# Patient Record
Sex: FEMALE | Race: WHITE | Employment: OTHER
[De-identification: names, ages, dates, MRNs, and addresses within clinical notes are randomized per-mention and may not be internally consistent; named-entity substitution may affect disease eponyms.]

---

## 2017-01-04 ENCOUNTER — OFFICE VISIT (OUTPATIENT)
Dept: ONCOLOGY | Facility: CLINIC | Age: 82
End: 2017-01-04

## 2017-01-04 VITALS
WEIGHT: 179.4 LBS | TEMPERATURE: 97.9 F | DIASTOLIC BLOOD PRESSURE: 77 MMHG | HEIGHT: 62 IN | HEART RATE: 79 BPM | RESPIRATION RATE: 16 BRPM | SYSTOLIC BLOOD PRESSURE: 154 MMHG | BODY MASS INDEX: 33.01 KG/M2

## 2017-01-04 DIAGNOSIS — C50.411 MALIGNANT NEOPLASM OF UPPER-OUTER QUADRANT OF RIGHT FEMALE BREAST (HCC): Primary | ICD-10-CM

## 2017-01-04 DIAGNOSIS — N63.0 BREAST LUMP: ICD-10-CM

## 2017-01-04 PROCEDURE — 1123F ACP DISCUSS/DSCN MKR DOCD: CPT | Performed by: INTERNAL MEDICINE

## 2017-01-04 PROCEDURE — 4040F PNEUMOC VAC/ADMIN/RCVD: CPT | Performed by: INTERNAL MEDICINE

## 2017-01-04 PROCEDURE — 1036F TOBACCO NON-USER: CPT | Performed by: INTERNAL MEDICINE

## 2017-01-04 PROCEDURE — G8427 DOCREV CUR MEDS BY ELIG CLIN: HCPCS | Performed by: INTERNAL MEDICINE

## 2017-01-04 PROCEDURE — G8419 CALC BMI OUT NRM PARAM NOF/U: HCPCS | Performed by: INTERNAL MEDICINE

## 2017-01-04 PROCEDURE — 1090F PRES/ABSN URINE INCON ASSESS: CPT | Performed by: INTERNAL MEDICINE

## 2017-01-04 PROCEDURE — G8484 FLU IMMUNIZE NO ADMIN: HCPCS | Performed by: INTERNAL MEDICINE

## 2017-01-04 PROCEDURE — G8400 PT W/DXA NO RESULTS DOC: HCPCS | Performed by: INTERNAL MEDICINE

## 2017-01-04 PROCEDURE — 99214 OFFICE O/P EST MOD 30 MIN: CPT | Performed by: INTERNAL MEDICINE

## 2017-02-09 RX ORDER — SITAGLIPTIN 100 MG/1
TABLET, FILM COATED ORAL
Qty: 90 TABLET | Refills: 1 | Status: SHIPPED | OUTPATIENT
Start: 2017-02-09 | End: 2017-04-03 | Stop reason: SDUPTHER

## 2017-03-13 ENCOUNTER — OFFICE VISIT (OUTPATIENT)
Dept: CARDIOLOGY | Age: 82
End: 2017-03-13
Payer: MEDICARE

## 2017-03-13 VITALS
OXYGEN SATURATION: 98 % | HEIGHT: 62 IN | DIASTOLIC BLOOD PRESSURE: 90 MMHG | HEART RATE: 82 BPM | WEIGHT: 180.4 LBS | BODY MASS INDEX: 33.2 KG/M2 | SYSTOLIC BLOOD PRESSURE: 123 MMHG

## 2017-03-13 DIAGNOSIS — I48.0 PAF (PAROXYSMAL ATRIAL FIBRILLATION) (HCC): Primary | ICD-10-CM

## 2017-03-13 DIAGNOSIS — E78.5 DYSLIPIDEMIA: ICD-10-CM

## 2017-03-13 DIAGNOSIS — Z79.01 CHRONIC ANTICOAGULATION: ICD-10-CM

## 2017-03-13 DIAGNOSIS — I05.9 MITRAL VALVE DISEASE: ICD-10-CM

## 2017-03-13 DIAGNOSIS — I10 ESSENTIAL HYPERTENSION: ICD-10-CM

## 2017-03-13 DIAGNOSIS — R94.31 ABNORMAL HOLTER EXAM: ICD-10-CM

## 2017-03-13 PROCEDURE — 1090F PRES/ABSN URINE INCON ASSESS: CPT | Performed by: INTERNAL MEDICINE

## 2017-03-13 PROCEDURE — G8417 CALC BMI ABV UP PARAM F/U: HCPCS | Performed by: INTERNAL MEDICINE

## 2017-03-13 PROCEDURE — G8400 PT W/DXA NO RESULTS DOC: HCPCS | Performed by: INTERNAL MEDICINE

## 2017-03-13 PROCEDURE — 99214 OFFICE O/P EST MOD 30 MIN: CPT | Performed by: INTERNAL MEDICINE

## 2017-03-13 PROCEDURE — 4040F PNEUMOC VAC/ADMIN/RCVD: CPT | Performed by: INTERNAL MEDICINE

## 2017-03-13 PROCEDURE — 1036F TOBACCO NON-USER: CPT | Performed by: INTERNAL MEDICINE

## 2017-03-13 PROCEDURE — 93000 ELECTROCARDIOGRAM COMPLETE: CPT | Performed by: INTERNAL MEDICINE

## 2017-03-13 PROCEDURE — 1123F ACP DISCUSS/DSCN MKR DOCD: CPT | Performed by: INTERNAL MEDICINE

## 2017-03-13 PROCEDURE — G8484 FLU IMMUNIZE NO ADMIN: HCPCS | Performed by: INTERNAL MEDICINE

## 2017-03-13 PROCEDURE — G8427 DOCREV CUR MEDS BY ELIG CLIN: HCPCS | Performed by: INTERNAL MEDICINE

## 2017-04-03 ENCOUNTER — OFFICE VISIT (OUTPATIENT)
Dept: PRIMARY CARE CLINIC | Age: 82
End: 2017-04-03
Payer: MEDICARE

## 2017-04-03 VITALS
RESPIRATION RATE: 16 BRPM | HEIGHT: 62 IN | BODY MASS INDEX: 33.57 KG/M2 | HEART RATE: 72 BPM | DIASTOLIC BLOOD PRESSURE: 85 MMHG | WEIGHT: 182.4 LBS | SYSTOLIC BLOOD PRESSURE: 130 MMHG

## 2017-04-03 DIAGNOSIS — I10 ESSENTIAL HYPERTENSION: ICD-10-CM

## 2017-04-03 DIAGNOSIS — Z23 NEED FOR VACCINATION WITH 13-POLYVALENT PNEUMOCOCCAL CONJUGATE VACCINE: ICD-10-CM

## 2017-04-03 DIAGNOSIS — E11.9 TYPE 2 DIABETES MELLITUS WITHOUT COMPLICATION, WITHOUT LONG-TERM CURRENT USE OF INSULIN (HCC): Primary | ICD-10-CM

## 2017-04-03 DIAGNOSIS — N28.9 RENAL INSUFFICIENCY: ICD-10-CM

## 2017-04-03 PROCEDURE — 4040F PNEUMOC VAC/ADMIN/RCVD: CPT | Performed by: FAMILY MEDICINE

## 2017-04-03 PROCEDURE — 99214 OFFICE O/P EST MOD 30 MIN: CPT | Performed by: FAMILY MEDICINE

## 2017-04-03 PROCEDURE — G8427 DOCREV CUR MEDS BY ELIG CLIN: HCPCS | Performed by: FAMILY MEDICINE

## 2017-04-03 PROCEDURE — G8417 CALC BMI ABV UP PARAM F/U: HCPCS | Performed by: FAMILY MEDICINE

## 2017-04-03 PROCEDURE — 1090F PRES/ABSN URINE INCON ASSESS: CPT | Performed by: FAMILY MEDICINE

## 2017-04-03 PROCEDURE — G8400 PT W/DXA NO RESULTS DOC: HCPCS | Performed by: FAMILY MEDICINE

## 2017-04-03 PROCEDURE — 1036F TOBACCO NON-USER: CPT | Performed by: FAMILY MEDICINE

## 2017-04-03 PROCEDURE — 90670 PCV13 VACCINE IM: CPT | Performed by: FAMILY MEDICINE

## 2017-04-03 PROCEDURE — G0009 ADMIN PNEUMOCOCCAL VACCINE: HCPCS | Performed by: FAMILY MEDICINE

## 2017-04-03 PROCEDURE — 1123F ACP DISCUSS/DSCN MKR DOCD: CPT | Performed by: FAMILY MEDICINE

## 2017-04-16 ASSESSMENT — ENCOUNTER SYMPTOMS
DIARRHEA: 0
CONSTIPATION: 0
ORTHOPNEA: 0
WHEEZING: 0
NAUSEA: 0
ABDOMINAL PAIN: 0
PHOTOPHOBIA: 0
COLOR CHANGE: 0
VOMITING: 0
BACK PAIN: 0
SHORTNESS OF BREATH: 0
TROUBLE SWALLOWING: 0
ABDOMINAL DISTENTION: 0
BLURRED VISION: 0
COUGH: 0

## 2017-04-17 ENCOUNTER — TELEPHONE (OUTPATIENT)
Dept: PRIMARY CARE CLINIC | Age: 82
End: 2017-04-17

## 2017-05-11 ENCOUNTER — HOSPITAL ENCOUNTER (OUTPATIENT)
Age: 82
Discharge: HOME | End: 2017-05-11
Payer: MEDICARE

## 2017-05-11 DIAGNOSIS — E11.9 TYPE 2 DIABETES MELLITUS WITHOUT COMPLICATION, WITHOUT LONG-TERM CURRENT USE OF INSULIN (HCC): ICD-10-CM

## 2017-05-11 DIAGNOSIS — N28.9 RENAL INSUFFICIENCY: ICD-10-CM

## 2017-05-11 LAB
ANION GAP SERPL CALCULATED.3IONS-SCNC: 15 MMOL/L (ref 9–17)
BUN BLDV-MCNC: 20 MG/DL (ref 8–23)
BUN/CREAT BLD: 18 (ref 9–20)
CALCIUM SERPL-MCNC: 9.4 MG/DL (ref 8.6–10.4)
CHLORIDE BLD-SCNC: 104 MMOL/L (ref 98–107)
CO2: 24 MMOL/L (ref 20–31)
CREAT SERPL-MCNC: 1.13 MG/DL (ref 0.5–0.9)
ESTIMATED AVERAGE GLUCOSE: 154 MG/DL
GFR AFRICAN AMERICAN: 56 ML/MIN
GFR NON-AFRICAN AMERICAN: 46 ML/MIN
GFR SERPL CREATININE-BSD FRML MDRD: ABNORMAL ML/MIN/{1.73_M2}
GFR SERPL CREATININE-BSD FRML MDRD: ABNORMAL ML/MIN/{1.73_M2}
GLUCOSE BLD-MCNC: 240 MG/DL (ref 70–99)
HBA1C MFR BLD: 7 % (ref 4.8–5.9)
POTASSIUM SERPL-SCNC: 4.1 MMOL/L (ref 3.7–5.3)
SODIUM BLD-SCNC: 143 MMOL/L (ref 135–144)

## 2017-05-11 PROCEDURE — 36415 COLL VENOUS BLD VENIPUNCTURE: CPT

## 2017-05-11 PROCEDURE — 83036 HEMOGLOBIN GLYCOSYLATED A1C: CPT

## 2017-05-11 PROCEDURE — 80048 BASIC METABOLIC PNL TOTAL CA: CPT

## 2017-05-12 ENCOUNTER — TELEPHONE (OUTPATIENT)
Dept: PRIMARY CARE CLINIC | Age: 82
End: 2017-05-12

## 2017-05-12 RX ORDER — LOSARTAN POTASSIUM 100 MG/1
TABLET ORAL
Qty: 90 TABLET | Refills: 1 | Status: SHIPPED | OUTPATIENT
Start: 2017-05-12 | End: 2017-10-25 | Stop reason: SDUPTHER

## 2017-05-17 ENCOUNTER — OFFICE VISIT (OUTPATIENT)
Dept: ONCOLOGY | Age: 82
End: 2017-05-17
Payer: MEDICARE

## 2017-05-17 VITALS
BODY MASS INDEX: 33.31 KG/M2 | HEART RATE: 85 BPM | RESPIRATION RATE: 20 BRPM | SYSTOLIC BLOOD PRESSURE: 141 MMHG | WEIGHT: 181 LBS | TEMPERATURE: 97.9 F | DIASTOLIC BLOOD PRESSURE: 73 MMHG | HEIGHT: 62 IN

## 2017-05-17 DIAGNOSIS — E11.9 TYPE 2 DIABETES MELLITUS WITHOUT COMPLICATION, WITHOUT LONG-TERM CURRENT USE OF INSULIN (HCC): ICD-10-CM

## 2017-05-17 DIAGNOSIS — C50.411 MALIGNANT NEOPLASM OF UPPER-OUTER QUADRANT OF RIGHT FEMALE BREAST (HCC): Primary | ICD-10-CM

## 2017-05-17 PROCEDURE — 99214 OFFICE O/P EST MOD 30 MIN: CPT | Performed by: INTERNAL MEDICINE

## 2017-05-17 PROCEDURE — 4040F PNEUMOC VAC/ADMIN/RCVD: CPT | Performed by: INTERNAL MEDICINE

## 2017-05-17 PROCEDURE — 1123F ACP DISCUSS/DSCN MKR DOCD: CPT | Performed by: INTERNAL MEDICINE

## 2017-05-17 PROCEDURE — G8400 PT W/DXA NO RESULTS DOC: HCPCS | Performed by: INTERNAL MEDICINE

## 2017-05-17 PROCEDURE — G8417 CALC BMI ABV UP PARAM F/U: HCPCS | Performed by: INTERNAL MEDICINE

## 2017-05-17 PROCEDURE — 1036F TOBACCO NON-USER: CPT | Performed by: INTERNAL MEDICINE

## 2017-05-17 PROCEDURE — 1090F PRES/ABSN URINE INCON ASSESS: CPT | Performed by: INTERNAL MEDICINE

## 2017-05-17 PROCEDURE — G8427 DOCREV CUR MEDS BY ELIG CLIN: HCPCS | Performed by: INTERNAL MEDICINE

## 2017-05-17 RX ORDER — NYSTATIN 100000 [USP'U]/G
POWDER TOPICAL
Qty: 1 BOTTLE | Refills: 2 | Status: SHIPPED | OUTPATIENT
Start: 2017-05-17 | End: 2017-12-06 | Stop reason: ALTCHOICE

## 2017-06-08 RX ORDER — ATORVASTATIN CALCIUM 40 MG/1
TABLET, FILM COATED ORAL
Qty: 90 TABLET | Refills: 1 | Status: SHIPPED | OUTPATIENT
Start: 2017-06-08 | End: 2017-10-25 | Stop reason: SDUPTHER

## 2017-06-13 RX ORDER — DILTIAZEM HYDROCHLORIDE 90 MG/1
CAPSULE, EXTENDED RELEASE ORAL
Qty: 180 CAPSULE | Refills: 1 | Status: SHIPPED | OUTPATIENT
Start: 2017-06-13 | End: 2017-08-29 | Stop reason: SDUPTHER

## 2017-08-17 PROBLEM — I48.0 PAROXYSMAL ATRIAL FIBRILLATION (HCC): Status: ACTIVE | Noted: 2017-08-17

## 2017-08-29 RX ORDER — DILTIAZEM HYDROCHLORIDE 90 MG/1
CAPSULE, EXTENDED RELEASE ORAL
Qty: 180 CAPSULE | Refills: 1 | Status: SHIPPED | OUTPATIENT
Start: 2017-08-29 | End: 2017-09-13

## 2017-09-13 ENCOUNTER — TELEPHONE (OUTPATIENT)
Dept: FAMILY MEDICINE CLINIC | Age: 82
End: 2017-09-13

## 2017-09-13 RX ORDER — DILTIAZEM HYDROCHLORIDE 90 MG/1
CAPSULE, EXTENDED RELEASE ORAL
Qty: 180 CAPSULE | Refills: 1 | Status: CANCELLED | OUTPATIENT
Start: 2017-09-13

## 2017-09-14 ENCOUNTER — OFFICE VISIT (OUTPATIENT)
Dept: CARDIOLOGY | Age: 82
End: 2017-09-14
Payer: MEDICARE

## 2017-09-14 VITALS
HEART RATE: 92 BPM | WEIGHT: 176 LBS | SYSTOLIC BLOOD PRESSURE: 137 MMHG | DIASTOLIC BLOOD PRESSURE: 61 MMHG | BODY MASS INDEX: 32.39 KG/M2 | HEIGHT: 62 IN | OXYGEN SATURATION: 94 %

## 2017-09-14 DIAGNOSIS — I48.0 PAROXYSMAL ATRIAL FIBRILLATION (HCC): Primary | ICD-10-CM

## 2017-09-14 DIAGNOSIS — I10 ESSENTIAL HYPERTENSION: ICD-10-CM

## 2017-09-14 DIAGNOSIS — E78.5 DYSLIPIDEMIA: ICD-10-CM

## 2017-09-14 DIAGNOSIS — R94.31 ABNORMAL HOLTER EXAM: ICD-10-CM

## 2017-09-14 PROCEDURE — G8417 CALC BMI ABV UP PARAM F/U: HCPCS | Performed by: INTERNAL MEDICINE

## 2017-09-14 PROCEDURE — 99214 OFFICE O/P EST MOD 30 MIN: CPT | Performed by: INTERNAL MEDICINE

## 2017-09-14 PROCEDURE — 93000 ELECTROCARDIOGRAM COMPLETE: CPT | Performed by: INTERNAL MEDICINE

## 2017-09-14 PROCEDURE — 4040F PNEUMOC VAC/ADMIN/RCVD: CPT | Performed by: INTERNAL MEDICINE

## 2017-09-14 PROCEDURE — G8427 DOCREV CUR MEDS BY ELIG CLIN: HCPCS | Performed by: INTERNAL MEDICINE

## 2017-09-14 PROCEDURE — 1090F PRES/ABSN URINE INCON ASSESS: CPT | Performed by: INTERNAL MEDICINE

## 2017-09-14 PROCEDURE — G8400 PT W/DXA NO RESULTS DOC: HCPCS | Performed by: INTERNAL MEDICINE

## 2017-09-14 PROCEDURE — 1123F ACP DISCUSS/DSCN MKR DOCD: CPT | Performed by: INTERNAL MEDICINE

## 2017-09-14 PROCEDURE — 1036F TOBACCO NON-USER: CPT | Performed by: INTERNAL MEDICINE

## 2017-10-19 RX ORDER — DILTIAZEM HYDROCHLORIDE 90 MG/1
CAPSULE, EXTENDED RELEASE ORAL
Qty: 180 CAPSULE | Refills: 1 | OUTPATIENT
Start: 2017-10-19

## 2017-10-25 ENCOUNTER — OFFICE VISIT (OUTPATIENT)
Dept: FAMILY MEDICINE CLINIC | Age: 82
End: 2017-10-25
Payer: MEDICARE

## 2017-10-25 VITALS
WEIGHT: 174 LBS | HEART RATE: 74 BPM | DIASTOLIC BLOOD PRESSURE: 68 MMHG | OXYGEN SATURATION: 97 % | SYSTOLIC BLOOD PRESSURE: 115 MMHG | BODY MASS INDEX: 31.83 KG/M2

## 2017-10-25 DIAGNOSIS — E11.9 TYPE 2 DIABETES MELLITUS WITHOUT COMPLICATION, WITHOUT LONG-TERM CURRENT USE OF INSULIN (HCC): Primary | ICD-10-CM

## 2017-10-25 DIAGNOSIS — K21.9 GASTROESOPHAGEAL REFLUX DISEASE WITHOUT ESOPHAGITIS: ICD-10-CM

## 2017-10-25 DIAGNOSIS — I10 ESSENTIAL HYPERTENSION: ICD-10-CM

## 2017-10-25 LAB — HBA1C MFR BLD: 6.2 %

## 2017-10-25 PROCEDURE — 83036 HEMOGLOBIN GLYCOSYLATED A1C: CPT | Performed by: FAMILY MEDICINE

## 2017-10-25 PROCEDURE — 1123F ACP DISCUSS/DSCN MKR DOCD: CPT | Performed by: FAMILY MEDICINE

## 2017-10-25 PROCEDURE — G8400 PT W/DXA NO RESULTS DOC: HCPCS | Performed by: FAMILY MEDICINE

## 2017-10-25 PROCEDURE — 4040F PNEUMOC VAC/ADMIN/RCVD: CPT | Performed by: FAMILY MEDICINE

## 2017-10-25 PROCEDURE — G8484 FLU IMMUNIZE NO ADMIN: HCPCS | Performed by: FAMILY MEDICINE

## 2017-10-25 PROCEDURE — 1090F PRES/ABSN URINE INCON ASSESS: CPT | Performed by: FAMILY MEDICINE

## 2017-10-25 PROCEDURE — 1036F TOBACCO NON-USER: CPT | Performed by: FAMILY MEDICINE

## 2017-10-25 PROCEDURE — G8417 CALC BMI ABV UP PARAM F/U: HCPCS | Performed by: FAMILY MEDICINE

## 2017-10-25 PROCEDURE — G8427 DOCREV CUR MEDS BY ELIG CLIN: HCPCS | Performed by: FAMILY MEDICINE

## 2017-10-25 PROCEDURE — 99214 OFFICE O/P EST MOD 30 MIN: CPT | Performed by: FAMILY MEDICINE

## 2017-10-25 RX ORDER — ATORVASTATIN CALCIUM 40 MG/1
TABLET, FILM COATED ORAL
Qty: 90 TABLET | Refills: 1 | Status: SHIPPED | OUTPATIENT
Start: 2017-10-25 | End: 2018-06-29 | Stop reason: SDUPTHER

## 2017-10-25 RX ORDER — FAMOTIDINE 20 MG/1
20 TABLET, FILM COATED ORAL
Qty: 45 TABLET | Refills: 1 | Status: SHIPPED | OUTPATIENT
Start: 2017-10-25 | End: 2018-03-15 | Stop reason: ALTCHOICE

## 2017-10-25 RX ORDER — LOSARTAN POTASSIUM 100 MG/1
TABLET ORAL
Qty: 90 TABLET | Refills: 1 | Status: SHIPPED | OUTPATIENT
Start: 2017-10-25 | End: 2018-04-24 | Stop reason: SDUPTHER

## 2017-10-25 RX ORDER — DILTIAZEM HYDROCHLORIDE 90 MG/1
CAPSULE, EXTENDED RELEASE ORAL
Refills: 1 | COMMUNITY
Start: 2017-10-18 | End: 2018-03-15 | Stop reason: SDUPTHER

## 2017-10-25 NOTE — PROGRESS NOTES
HPI Notes    Name: Vijay Nelson  : 1934         Chief Complaint:     Chief Complaint   Patient presents with    Diabetes       History of Present Illness:      Vijay Nelson is a 80 y.o.  female who presents with Diabetes      Diabetes   She presents for her follow-up diabetic visit. She has type 2 diabetes mellitus. Her disease course has been stable. There are no hypoglycemic associated symptoms. Pertinent negatives for hypoglycemia include no dizziness, headaches, nervousness/anxiousness or sweats. There are no diabetic associated symptoms. Pertinent negatives for diabetes include no chest pain, no fatigue, no polydipsia, no polyphagia, no polyuria, no weakness and no weight loss. There are no hypoglycemic complications. Symptoms are stable. There are no diabetic complications. Risk factors for coronary artery disease include diabetes mellitus, hypertension, obesity and post-menopausal. Current diabetic treatment includes oral agent (monotherapy). She is compliant with treatment all of the time. Her weight is stable. She is following a generally healthy diet. Meal planning includes avoidance of concentrated sweets. She participates in exercise intermittently. There is no change in her home blood glucose trend. An ACE inhibitor/angiotensin II receptor blocker is being taken. Hypertension   This is a chronic problem. The current episode started more than 1 year ago. The problem is unchanged. The problem is controlled. Pertinent negatives include no anxiety, chest pain, headaches, malaise/fatigue, neck pain, orthopnea, palpitations, peripheral edema, shortness of breath or sweats. Past treatments include calcium channel blockers and angiotensin blockers. The current treatment provides significant improvement. There are no compliance problems. Gastroesophageal Reflux   She complains of heartburn. She reports no abdominal pain, no chest pain, no coughing, no nausea or no wheezing.  This is a  EYE SURGERY      OTHER SURGICAL HISTORY Right 10/21/15    I&D Rt. breast abcess    TUNNELED VENOUS PORT PLACEMENT Left 06/17/2015        Medications:       Prior to Admission medications    Medication Sig Start Date End Date Taking? Authorizing Provider   SITagliptin (JANUVIA) 100 MG tablet TAKE 1 TABLET EVERY DAY 10/25/17  Yes Della Grewal DO   atorvastatin (LIPITOR) 40 MG tablet TAKE 1 TABLET EVERY DAY 10/25/17  Yes Della Grewal DO   losartan (COZAAR) 100 MG tablet TAKE 1 TABLET EVERY DAY 10/25/17  Yes Della Grewal DO   famotidine (PEPCID) 20 MG tablet Take 1 tablet by mouth every 48 hours 10/25/17  Yes Della Grewal DO   diltiazem (CARDIZEM) 30 MG tablet Take 1.5 tablets by mouth 4 times daily  Patient taking differently: Take 45 mg by mouth 2 times daily  9/13/17  Yes Della Grewal DO   apixaban (ELIQUIS) 2.5 MG TABS tablet Take 1 tablet by mouth 2 times daily 8/31/17  Yes Kalyani Cho MD   nystatin (MYCOSTATIN) 022594 UNIT/GM powder Apply 3 times daily. 5/17/17  Yes Hermilo Saavedra MD   fluticasone (FLONASE) 50 MCG/ACT nasal spray 1 spray by Nasal route daily  Patient taking differently: 1 spray by Nasal route daily as needed  8/10/15  Yes Geoffrey Martel MD   diphenhydrAMINE (BENADRYL) 25 MG tablet   Take 25 mg by mouth nightly as needed for Sleep    Yes Historical Provider, MD   Multiple Vitamins-Minerals (THERAPEUTIC MULTIVITAMIN-MINERALS) tablet Take 1 tablet by mouth every other day   Yes Historical Provider, MD   omeprazole (PRILOSEC) 10 MG capsule Take 10 mg by mouth daily. Yes Historical Provider, MD   acetaminophen (TYLENOL) 500 MG tablet Take 1,000 mg by mouth every 6 hours as needed. Yes Historical Provider, MD   Calcium Carbonate-Vitamin D (CALCIUM 600 + D PO) Take 1 tablet by mouth daily. Yes Historical Provider, MD   Multiple Vitamins-Minerals (OCUVITE PRESERVISION PO) Take  by mouth daily.    Yes Historical Provider, MD   diltiazem (CARDIZEM 12 HR) 90 MG appears well-developed and well-nourished. HENT:   Head: Normocephalic and atraumatic. Right Ear: External ear normal.   Left Ear: External ear normal.   Eyes: Conjunctivae and EOM are normal.   Neck: Normal range of motion. Neck supple. No thyromegaly present. Cardiovascular: Normal rate, regular rhythm and normal heart sounds. Exam reveals no gallop and no friction rub. No murmur heard. Pulmonary/Chest: Effort normal and breath sounds normal. No respiratory distress. She has no wheezes. She has no rales. She exhibits no tenderness. Abdominal: Soft. Bowel sounds are normal. She exhibits no distension. There is no tenderness. There is no rebound and no guarding. Musculoskeletal: Normal range of motion. She exhibits no edema. Lymphadenopathy:     She has no cervical adenopathy. Neurological: She is alert and oriented to person, place, and time. She exhibits normal muscle tone. Coordination normal.   Skin: Skin is warm and dry. No rash noted. No erythema. Psychiatric: She has a normal mood and affect. Her behavior is normal. Judgment and thought content normal.   Nursing note and vitals reviewed.       Vitals:  /68   Pulse 74   Wt 174 lb (78.9 kg)   SpO2 97%   BMI 31.83 kg/m²       Data:     Lab Results   Component Value Date     05/11/2017    K 4.1 05/11/2017     05/11/2017    CO2 24 05/11/2017    BUN 20 05/11/2017    CREATININE 1.13 05/11/2017    GLUCOSE 240 05/11/2017    PROT 6.6 05/04/2016    LABALBU 3.8 05/04/2016    BILITOT 0.38 05/04/2016    ALKPHOS 89 05/04/2016    AST 18 05/04/2016    ALT 9 05/04/2016     Lab Results   Component Value Date    WBC 3.8 05/04/2016    RBC 3.95 05/04/2016    HGB 11.6 05/04/2016    HCT 35.2 05/04/2016    MCV 89.2 05/04/2016    MCH 29.3 05/04/2016    MCHC 32.8 05/04/2016    RDW 15.3 05/04/2016     05/04/2016    MPV NOT REPORTED 05/04/2016     Lab Results   Component Value Date    TSH 2.07 04/18/2014     Lab Results   Component Value

## 2017-11-12 ASSESSMENT — ENCOUNTER SYMPTOMS
PHOTOPHOBIA: 0
SHORTNESS OF BREATH: 0
ABDOMINAL DISTENTION: 0
DIARRHEA: 0
HEARTBURN: 1
COLOR CHANGE: 0
TROUBLE SWALLOWING: 0
WHEEZING: 0
VOMITING: 0
BACK PAIN: 0
ORTHOPNEA: 0
ABDOMINAL PAIN: 0
COUGH: 0
NAUSEA: 0
CONSTIPATION: 0

## 2017-11-30 ENCOUNTER — TELEPHONE (OUTPATIENT)
Dept: CARDIOLOGY | Age: 82
End: 2017-11-30

## 2017-11-30 ENCOUNTER — HOSPITAL ENCOUNTER (OUTPATIENT)
Dept: WOMENS IMAGING | Age: 82
Discharge: HOME | End: 2017-11-30
Payer: MEDICARE

## 2017-11-30 DIAGNOSIS — E11.9 TYPE 2 DIABETES MELLITUS WITHOUT COMPLICATION, WITHOUT LONG-TERM CURRENT USE OF INSULIN (HCC): ICD-10-CM

## 2017-11-30 DIAGNOSIS — C50.411 MALIGNANT NEOPLASM OF UPPER-OUTER QUADRANT OF RIGHT FEMALE BREAST (HCC): ICD-10-CM

## 2017-11-30 PROCEDURE — G0204 DX MAMMO INCL CAD BI: HCPCS

## 2017-12-06 ENCOUNTER — OFFICE VISIT (OUTPATIENT)
Dept: ONCOLOGY | Age: 82
End: 2017-12-06
Payer: MEDICARE

## 2017-12-06 VITALS
HEART RATE: 92 BPM | HEIGHT: 62 IN | DIASTOLIC BLOOD PRESSURE: 89 MMHG | BODY MASS INDEX: 32.02 KG/M2 | WEIGHT: 174 LBS | RESPIRATION RATE: 20 BRPM | TEMPERATURE: 97.5 F | SYSTOLIC BLOOD PRESSURE: 147 MMHG

## 2017-12-06 DIAGNOSIS — Z17.1 MALIGNANT NEOPLASM OF UPPER-OUTER QUADRANT OF RIGHT BREAST IN FEMALE, ESTROGEN RECEPTOR NEGATIVE (HCC): Primary | ICD-10-CM

## 2017-12-06 DIAGNOSIS — I48.0 PAROXYSMAL ATRIAL FIBRILLATION (HCC): ICD-10-CM

## 2017-12-06 DIAGNOSIS — C50.411 MALIGNANT NEOPLASM OF UPPER-OUTER QUADRANT OF RIGHT BREAST IN FEMALE, ESTROGEN RECEPTOR NEGATIVE (HCC): Primary | ICD-10-CM

## 2017-12-06 PROCEDURE — 4040F PNEUMOC VAC/ADMIN/RCVD: CPT | Performed by: INTERNAL MEDICINE

## 2017-12-06 PROCEDURE — 1036F TOBACCO NON-USER: CPT | Performed by: INTERNAL MEDICINE

## 2017-12-06 PROCEDURE — G8484 FLU IMMUNIZE NO ADMIN: HCPCS | Performed by: INTERNAL MEDICINE

## 2017-12-06 PROCEDURE — G8417 CALC BMI ABV UP PARAM F/U: HCPCS | Performed by: INTERNAL MEDICINE

## 2017-12-06 PROCEDURE — 1123F ACP DISCUSS/DSCN MKR DOCD: CPT | Performed by: INTERNAL MEDICINE

## 2017-12-06 PROCEDURE — 1090F PRES/ABSN URINE INCON ASSESS: CPT | Performed by: INTERNAL MEDICINE

## 2017-12-06 PROCEDURE — G8400 PT W/DXA NO RESULTS DOC: HCPCS | Performed by: INTERNAL MEDICINE

## 2017-12-06 PROCEDURE — 99214 OFFICE O/P EST MOD 30 MIN: CPT | Performed by: INTERNAL MEDICINE

## 2017-12-06 PROCEDURE — G8427 DOCREV CUR MEDS BY ELIG CLIN: HCPCS | Performed by: INTERNAL MEDICINE

## 2017-12-06 NOTE — LETTER
Azar Motta MD    12/6/2017     Melisa Kwong DO   [address]  [place] [id number]    Dear Luis Armando Abernathy : Thank you for referring Ramya Quiñonez, 1934, to me for evaluation. Below are the relevant portions of my assessment and plan of care. Chief Complaint   Patient presents with    Breast Cancer     Pertinent clinical problems/treatment:    1. Right-sided breast cancer, T2 N1 M0, ER negative/MN negative/HER-2 negative  2. Imaging studies including PET scan, no distant metastatic disease  3. Neoadjuvant chemo, weekly carbotaxol, started  June 23, 2015. C2 on 7/14, C3 8/4/15, then stopped   4. S/p lumpectomy and node dissection:pT2N2, multiple positive lymph nodes, September 15, 2015  5. Adjuvant radiation       Summary of the case    Ms. Ramya Quiñonez is a very pleasant 80 y.o. female . She was doing fine until earlier this year, when she felt a mass int he upper outer quadrant of her right breast. The lump did not change after a few months, so she consulted with her doctor. Mammogram was done on 5/11/15 and showed large mass at the 10:00 position with spiculated margins. Ultrasound shortly followed and showed 3.4 cm mass at the 10:00 position with irregular margins. At least one large axillary node was appreciated. The patient did not have mammograms for many years before this. Biopsy was done and showed invasive ductal carcinoma, measuring at least 1.6 cm in the bx specimen. The tumor was ER/MN and HER 2 negative (triple negative ) . Clinical stage is T2N1M0. The patient underwent neoadjuvant chemotherapy, received 3 cycles, after that she underwent lumpectomy and node dissection. Pathologically the tumor was staged at T2 N2 M0. About 9 axillary lymph nodes were appreciated. After surgery, she had problem with tissue healing. She was managed conservatively. The wound slowly healed by secondary intention. oriented. No focal neurological deficits. Psychosocial: No depression, anxiety or stress. Skin: No rashes, bruises or ecchymoses. Review of Diagnostic data:    Ct scan  Impression   IMPRESSION:    1. New postoperative findings of the right breast and axilla   2. Tiny residual right axillary lymph node   3. Large hiatal hernia   4. Benign right renal cysts   5. Stable compression fracture T9 with methylmethacrylate augmentation     CT of the chest 5/4/2016  1. Slight decrease in size of the residual right axillary lymph node   2. Progressive but benign appearing pleural parenchymal thickening in both lung apices         Lab Results   Component Value Date    WBC 3.8 05/04/2016    HGB 11.6 (L) 05/04/2016    HCT 35.2 (L) 05/04/2016    MCV 89.2 05/04/2016     05/04/2016       Chemistry        Component Value Date/Time     05/11/2017 1523    K 4.1 05/11/2017 1523     05/11/2017 1523    CO2 24 05/11/2017 1523    BUN 20 05/11/2017 1523    CREATININE 1.13 (H) 05/11/2017 1523        Component Value Date/Time    CALCIUM 9.4 05/11/2017 1523    ALKPHOS 89 05/04/2016 1002    AST 18 05/04/2016 1002    ALT 9 05/04/2016 1002    BILITOT 0.38 05/04/2016 1002        CT scan 11/14/16:  Impression   1. Continued decrease in size of the residual right axillary lymph node   2. Stable benign appearing pleural parenchymal thickening in both lung apices               IMPRESSION:     1. Locally advanced triple negative right sided breast cancer, clinical stage is T2N1/N2M0. Yahaira Driscoll PET scan showed no evidence of distant metastatic disease but multiple lymph nodes. She is undergoing neoadjuvant chemotherapy in the form of weekly carbotaxol. He received 3 cycles of chemotherapy with very good clinical response.   Ultrasound showing some shrinkage but does not reflect the true clinical response that I do appreciate by clinical exam.  Patient is getting more fatigue from chemotherapy. We feel that moving to surgery would be the right answer for the patient. Surgery was done around 16 September 2015 and showed 2.2 cm residual cancer as well as multiple lymph nodes, around 9 that are positive  2. Underwent adjuvant radiation. 3. Ct scan of the chest 5/4/2016 is stable and the right axillary LN decreased slightly. 4. Advanced age and multiple comorbidities   5. Atrial fibrillation, treated with anticoagulation and rate control   6. Axillary Lymphadenopathy. Repeated CT scan is stable. 7. Small lesion in left breast. Fibroglandular 2 cm area at 12:00          PLAN:       1. The patient is clinically doing very well, she has no symptoms. She is in clinical remission  2. Continues to do very well. 3. Continue observation, unfortunately, she has a high risk of recurrence based on the number of lymph nodes she received. 4. Return in 6 months          Estefania Dunne MD  Hematologist/Medical [id number] [facility]  Cell: [phone]                           If you have questions, please do not hesitate to call me. I look forward to following Wolf Zepeda along with you.     Sincerely,    Thais Stokes MD  Hematology/ Oncology  Cell [phone]

## 2017-12-06 NOTE — PROGRESS NOTES
under her right arm has completely resolved. Performance status is ECOG 1  Lab Results   Component Value Date    WBC 3.8 2016    HGB 11.6 (L) 2016    HCT 35.2 (L) 2016    MCV 89.2 2016     2016             PAST MEDICAL HISTORY: has a past medical history of Cancer ([facility] [address] Diabetes mellitus ([facility] [address] Duodenal ulcer; Esophageal hernia; Gastric ulcer with hemorrhage; H/O echocardiogram; Hiatal hernia; History of Holter monitoring; Hyperlipidemia; Hypertension; Macular degeneration; PONV (postoperative nausea and vomiting); and Stress fracture of lumbar vertebra. PAST SURGICAL HISTORY: has a past surgical history that includes bladder suspension; Cataract removal; Abdomen surgery (2013); eye surgery;  section; Endoscopy, colon, diagnostic; back surgery (2013); Tunneled venous port placement (Left, 2015); Breast lumpectomy (Right, 2015); and other surgical history (Right, 10/21/15). Detail about abd surgery: she had bleeding ulcer that eroded to an artery. She underwent surgery and took long time to recover. CURRENT MEDICATIONS:  has a current medication list which includes the following prescription(s): sitagliptin, atorvastatin, losartan, famotidine, apixaban, fluticasone, diphenhydramine, therapeutic multivitamin-minerals, omeprazole, acetaminophen, calcium carb-cholecalciferol, multiple vitamins-minerals, diltiazem, and diltiazem, and the following Facility-Administered Medications: sodium chloride (pf) and heparin flush. SOCIAL HISTORY:  reports that she has quit smoking. She has a 1.00 pack-year smoking history. She has never used smokeless tobacco. She reports that she does not drink alcohol or use drugs. REVIEW OF SYSTEMS:     General: No fever no chills, fatigue improved significantly  ENT: No double vision, visual difficulty due to macular degeneration.   no tinnitus or hearing problem, no dysphagia or sore throat   Respiratory: No chest control   6. Axillary Lymphadenopathy. Repeated CT scan is stable. 7. Small lesion in left breast. Fibroglandular 2 cm area at 12:00          PLAN:       1. The patient is clinically doing very well, she has no symptoms. She is in clinical remission  2. Continues to do very well. 3. Continue observation, unfortunately, she has a high risk of recurrence based on the number of lymph nodes she received.   4. Return in 6 months          BIA ROBERTSCarroll [facility] MD [place]  Hematologist/Medical [id number] [facility]  Cell: [phone]

## 2017-12-14 ENCOUNTER — TELEPHONE (OUTPATIENT)
Dept: FAMILY MEDICINE CLINIC | Age: 82
End: 2017-12-14

## 2017-12-14 DIAGNOSIS — E11.9 TYPE 2 DIABETES MELLITUS WITHOUT COMPLICATION, WITHOUT LONG-TERM CURRENT USE OF INSULIN (HCC): Primary | ICD-10-CM

## 2017-12-14 RX ORDER — DILTIAZEM HYDROCHLORIDE 90 MG/1
CAPSULE, EXTENDED RELEASE ORAL
Qty: 180 CAPSULE | Refills: 1 | Status: SHIPPED | OUTPATIENT
Start: 2017-12-14 | End: 2018-05-05 | Stop reason: SDUPTHER

## 2017-12-14 NOTE — TELEPHONE ENCOUNTER
Patient asking for samples januvia 100 in [facility] and daughter won't drive her to [facility]  states they have samples they will give her provider just needs to send order    Health Maintenance   Topic Date Due    DTaP/Tdap/Td vaccine (1 - Tdap) 09/22/1953    Zostavax vaccine  09/22/1994    DEXA (modify frequency per FRAX score)  09/22/1999    Flu vaccine (1) 09/01/2017    Pneumococcal low/med risk  Completed             (applicable per patient's age: Cancer Screenings, Depression Screening, Fall Risk Screening, Immunizations)    Hemoglobin A1C (%)   Date Value   10/25/2017 6.2   05/11/2017 7.0 (H)   05/04/2016 6.2 (H)     Microalb/Crt.  Ratio (mcg/mg creat)   Date Value   02/26/2015 CANNOT BE CALCULATED     LDL Cholesterol (mg/dL)   Date Value   02/26/2015 56     AST (U/L)   Date Value   05/04/2016 18     ALT (U/L)   Date Value   05/04/2016 9     BUN (mg/dL)   Date Value   05/11/2017 20      (goal A1C is < 7)   (goal LDL is <100) need 30-50% reduction from baseline     BP Readings from Last 3 Encounters:   12/06/17 (!) 147/89   10/25/17 115/68   09/14/17 137/61    (goal /80)      All Future Testing planned in [facility]:  Lab Frequency Next Occurrence       Next Visit Date:  Future Appointments  Date Time Provider Belkys Elizalde   3/15/2018 1:00 PM [facility]   4/25/2018 1:00 PM DO NAOMIE Nevarez [facility]   6/6/2018 1:20 PM Ashley Dunne MD [facility]            Patient Active Problem List:     Bilateral atelectasis     Bilateral pleural effusion     Thoracic vertebral fracture (T9 fracture)     DM type 2 (diabetes mellitus, type 2) (HCC)     HTN (hypertension)     HLD (hyperlipidemia)     Duodenal ulcer s/p surgery     Fall involving wheelchair causing accidental injury     Abdominal pain, other specified site     Anemia     Dysphagia     Malignant neoplasm of upper-outer quadrant of female breast ([address]     Paroxysmal atrial

## 2018-01-01 ENCOUNTER — HOSPITAL ENCOUNTER (OUTPATIENT)
Age: 83
Setting detail: OUTPATIENT SURGERY
Discharge: HOME | End: 2018-11-27
Attending: INTERNAL MEDICINE | Admitting: INTERNAL MEDICINE
Payer: MEDICARE

## 2018-01-01 ENCOUNTER — HOSPITAL ENCOUNTER (OUTPATIENT)
Dept: ULTRASOUND IMAGING | Age: 83
Discharge: HOME | End: 2018-12-29
Payer: MEDICARE

## 2018-01-01 ENCOUNTER — TELEPHONE (OUTPATIENT)
Dept: GASTROENTEROLOGY | Age: 83
End: 2018-01-01

## 2018-01-01 ENCOUNTER — OFFICE VISIT (OUTPATIENT)
Dept: CARDIOLOGY | Age: 83
End: 2018-01-01
Payer: MEDICARE

## 2018-01-01 ENCOUNTER — HOSPITAL ENCOUNTER (OUTPATIENT)
Dept: WOMENS IMAGING | Age: 83
Discharge: HOME | End: 2018-09-29
Payer: MEDICARE

## 2018-01-01 ENCOUNTER — OFFICE VISIT (OUTPATIENT)
Dept: ONCOLOGY | Age: 83
End: 2018-01-01
Payer: MEDICARE

## 2018-01-01 ENCOUNTER — HOSPITAL ENCOUNTER (OUTPATIENT)
Dept: NON INVASIVE DIAGNOSTICS | Age: 83
Discharge: HOME | End: 2018-09-24
Payer: MEDICARE

## 2018-01-01 ENCOUNTER — OFFICE VISIT (OUTPATIENT)
Dept: GASTROENTEROLOGY | Age: 83
End: 2018-01-01
Payer: MEDICARE

## 2018-01-01 ENCOUNTER — TELEPHONE (OUTPATIENT)
Dept: CARDIOLOGY | Age: 83
End: 2018-01-01

## 2018-01-01 ENCOUNTER — TELEPHONE (OUTPATIENT)
Dept: ONCOLOGY | Age: 83
End: 2018-01-01

## 2018-01-01 ENCOUNTER — OFFICE VISIT (OUTPATIENT)
Dept: FAMILY MEDICINE CLINIC | Age: 83
End: 2018-01-01
Payer: MEDICARE

## 2018-01-01 ENCOUNTER — ANESTHESIA EVENT (OUTPATIENT)
Dept: OPERATING ROOM | Age: 83
End: 2018-01-01
Payer: MEDICARE

## 2018-01-01 ENCOUNTER — HOSPITAL ENCOUNTER (OUTPATIENT)
Dept: ULTRASOUND IMAGING | Age: 83
Discharge: HOME | End: 2018-09-29
Payer: MEDICARE

## 2018-01-01 ENCOUNTER — ANESTHESIA (OUTPATIENT)
Dept: OPERATING ROOM | Age: 83
End: 2018-01-01
Payer: MEDICARE

## 2018-01-01 ENCOUNTER — HOSPITAL ENCOUNTER (OUTPATIENT)
Dept: CT IMAGING | Age: 83
Discharge: HOME | End: 2018-09-29
Payer: MEDICARE

## 2018-01-01 VITALS
OXYGEN SATURATION: 99 % | HEIGHT: 62 IN | BODY MASS INDEX: 32.2 KG/M2 | WEIGHT: 175 LBS | DIASTOLIC BLOOD PRESSURE: 82 MMHG | TEMPERATURE: 97.6 F | HEART RATE: 77 BPM | SYSTOLIC BLOOD PRESSURE: 155 MMHG | RESPIRATION RATE: 18 BRPM

## 2018-01-01 VITALS
DIASTOLIC BLOOD PRESSURE: 68 MMHG | SYSTOLIC BLOOD PRESSURE: 128 MMHG | RESPIRATION RATE: 16 BRPM | TEMPERATURE: 98.6 F | OXYGEN SATURATION: 97 %

## 2018-01-01 VITALS
HEART RATE: 97 BPM | SYSTOLIC BLOOD PRESSURE: 124 MMHG | HEIGHT: 62 IN | OXYGEN SATURATION: 96 % | WEIGHT: 166.4 LBS | BODY MASS INDEX: 30.62 KG/M2 | DIASTOLIC BLOOD PRESSURE: 84 MMHG

## 2018-01-01 VITALS
DIASTOLIC BLOOD PRESSURE: 80 MMHG | OXYGEN SATURATION: 96 % | HEART RATE: 86 BPM | BODY MASS INDEX: 30.73 KG/M2 | SYSTOLIC BLOOD PRESSURE: 120 MMHG | WEIGHT: 168 LBS

## 2018-01-01 VITALS
TEMPERATURE: 98.3 F | RESPIRATION RATE: 18 BRPM | BODY MASS INDEX: 30.29 KG/M2 | SYSTOLIC BLOOD PRESSURE: 133 MMHG | WEIGHT: 164.6 LBS | HEART RATE: 109 BPM | DIASTOLIC BLOOD PRESSURE: 104 MMHG | HEIGHT: 62 IN

## 2018-01-01 VITALS
TEMPERATURE: 97 F | DIASTOLIC BLOOD PRESSURE: 79 MMHG | RESPIRATION RATE: 18 BRPM | WEIGHT: 167.5 LBS | HEART RATE: 90 BPM | HEIGHT: 62 IN | BODY MASS INDEX: 30.82 KG/M2 | SYSTOLIC BLOOD PRESSURE: 138 MMHG

## 2018-01-01 VITALS
BODY MASS INDEX: 30.88 KG/M2 | HEART RATE: 86 BPM | RESPIRATION RATE: 20 BRPM | DIASTOLIC BLOOD PRESSURE: 97 MMHG | WEIGHT: 167.8 LBS | SYSTOLIC BLOOD PRESSURE: 171 MMHG | TEMPERATURE: 97.5 F | HEIGHT: 62 IN

## 2018-01-01 VITALS
OXYGEN SATURATION: 97 % | HEART RATE: 105 BPM | SYSTOLIC BLOOD PRESSURE: 134 MMHG | WEIGHT: 165.2 LBS | RESPIRATION RATE: 16 BRPM | HEIGHT: 62 IN | BODY MASS INDEX: 30.4 KG/M2 | DIASTOLIC BLOOD PRESSURE: 85 MMHG

## 2018-01-01 VITALS
DIASTOLIC BLOOD PRESSURE: 80 MMHG | HEART RATE: 107 BPM | SYSTOLIC BLOOD PRESSURE: 121 MMHG | OXYGEN SATURATION: 94 % | RESPIRATION RATE: 16 BRPM

## 2018-01-01 DIAGNOSIS — Z17.1 MALIGNANT NEOPLASM OF UPPER-OUTER QUADRANT OF RIGHT BREAST IN FEMALE, ESTROGEN RECEPTOR NEGATIVE (HCC): ICD-10-CM

## 2018-01-01 DIAGNOSIS — R91.1 LUNG NODULE: ICD-10-CM

## 2018-01-01 DIAGNOSIS — I10 HYPERTENSION, UNSPECIFIED TYPE: ICD-10-CM

## 2018-01-01 DIAGNOSIS — I48.0 PAROXYSMAL ATRIAL FIBRILLATION (HCC): Primary | ICD-10-CM

## 2018-01-01 DIAGNOSIS — R13.10 DYSPHAGIA, UNSPECIFIED TYPE: Primary | ICD-10-CM

## 2018-01-01 DIAGNOSIS — I48.0 PAROXYSMAL ATRIAL FIBRILLATION (HCC): ICD-10-CM

## 2018-01-01 DIAGNOSIS — Z79.01 CHRONIC ANTICOAGULATION: ICD-10-CM

## 2018-01-01 DIAGNOSIS — C50.411 MALIGNANT NEOPLASM OF UPPER-OUTER QUADRANT OF RIGHT BREAST IN FEMALE, ESTROGEN RECEPTOR NEGATIVE (HCC): ICD-10-CM

## 2018-01-01 DIAGNOSIS — K44.9 HIATAL HERNIA: ICD-10-CM

## 2018-01-01 DIAGNOSIS — R11.0 NAUSEA: ICD-10-CM

## 2018-01-01 DIAGNOSIS — E11.9 TYPE 2 DIABETES MELLITUS WITHOUT COMPLICATION, WITHOUT LONG-TERM CURRENT USE OF INSULIN (HCC): ICD-10-CM

## 2018-01-01 DIAGNOSIS — I48.0 PAF (PAROXYSMAL ATRIAL FIBRILLATION) (HCC): Primary | ICD-10-CM

## 2018-01-01 DIAGNOSIS — E78.2 MIXED HYPERLIPIDEMIA: ICD-10-CM

## 2018-01-01 DIAGNOSIS — R11.0 NAUSEA: Primary | ICD-10-CM

## 2018-01-01 DIAGNOSIS — R63.4 WEIGHT LOSS: ICD-10-CM

## 2018-01-01 DIAGNOSIS — I05.0 MODERATE MITRAL VALVE STENOSIS: ICD-10-CM

## 2018-01-01 DIAGNOSIS — C50.411 MALIGNANT NEOPLASM OF UPPER-OUTER QUADRANT OF RIGHT BREAST IN FEMALE, ESTROGEN RECEPTOR NEGATIVE (HCC): Primary | ICD-10-CM

## 2018-01-01 DIAGNOSIS — I10 ESSENTIAL HYPERTENSION: Primary | ICD-10-CM

## 2018-01-01 DIAGNOSIS — Z17.1 MALIGNANT NEOPLASM OF UPPER-OUTER QUADRANT OF RIGHT BREAST IN FEMALE, ESTROGEN RECEPTOR NEGATIVE (HCC): Primary | ICD-10-CM

## 2018-01-01 DIAGNOSIS — Z87.19 HISTORY OF DUODENAL ULCER: ICD-10-CM

## 2018-01-01 DIAGNOSIS — I05.9 MITRAL VALVE DISEASE: ICD-10-CM

## 2018-01-01 DIAGNOSIS — I10 ESSENTIAL HYPERTENSION: ICD-10-CM

## 2018-01-01 DIAGNOSIS — Z87.19 HISTORY OF UPPER GASTROINTESTINAL BLEEDING: ICD-10-CM

## 2018-01-01 DIAGNOSIS — I34.2 NON-RHEUMATIC MITRAL VALVE STENOSIS: ICD-10-CM

## 2018-01-01 DIAGNOSIS — E78.00 PURE HYPERCHOLESTEROLEMIA: ICD-10-CM

## 2018-01-01 DIAGNOSIS — R94.31 ABNORMAL ECG: ICD-10-CM

## 2018-01-01 DIAGNOSIS — E04.1 LEFT THYROID NODULE: ICD-10-CM

## 2018-01-01 LAB
GLUCOSE BLD-MCNC: 98 MG/DL (ref 74–100)
HBA1C MFR BLD: 6.3 %
LV EF: 70 %
LVEF MODALITY: NORMAL
SURGICAL PATHOLOGY REPORT: NORMAL

## 2018-01-01 PROCEDURE — 82947 ASSAY GLUCOSE BLOOD QUANT: CPT

## 2018-01-01 PROCEDURE — 99214 OFFICE O/P EST MOD 30 MIN: CPT | Performed by: FAMILY MEDICINE

## 2018-01-01 PROCEDURE — 4040F PNEUMOC VAC/ADMIN/RCVD: CPT | Performed by: INTERNAL MEDICINE

## 2018-01-01 PROCEDURE — 2709999900 HC NON-CHARGEABLE SUPPLY: Performed by: INTERNAL MEDICINE

## 2018-01-01 PROCEDURE — G8400 PT W/DXA NO RESULTS DOC: HCPCS | Performed by: FAMILY MEDICINE

## 2018-01-01 PROCEDURE — G8484 FLU IMMUNIZE NO ADMIN: HCPCS | Performed by: INTERNAL MEDICINE

## 2018-01-01 PROCEDURE — G8427 DOCREV CUR MEDS BY ELIG CLIN: HCPCS | Performed by: INTERNAL MEDICINE

## 2018-01-01 PROCEDURE — 60100 BIOPSY OF THYROID: CPT

## 2018-01-01 PROCEDURE — 99204 OFFICE O/P NEW MOD 45 MIN: CPT | Performed by: INTERNAL MEDICINE

## 2018-01-01 PROCEDURE — 93000 ELECTROCARDIOGRAM COMPLETE: CPT | Performed by: INTERNAL MEDICINE

## 2018-01-01 PROCEDURE — 1036F TOBACCO NON-USER: CPT | Performed by: INTERNAL MEDICINE

## 2018-01-01 PROCEDURE — 1090F PRES/ABSN URINE INCON ASSESS: CPT | Performed by: FAMILY MEDICINE

## 2018-01-01 PROCEDURE — 1101F PT FALLS ASSESS-DOCD LE1/YR: CPT | Performed by: INTERNAL MEDICINE

## 2018-01-01 PROCEDURE — 1090F PRES/ABSN URINE INCON ASSESS: CPT | Performed by: INTERNAL MEDICINE

## 2018-01-01 PROCEDURE — 4040F PNEUMOC VAC/ADMIN/RCVD: CPT | Performed by: FAMILY MEDICINE

## 2018-01-01 PROCEDURE — 99214 OFFICE O/P EST MOD 30 MIN: CPT | Performed by: INTERNAL MEDICINE

## 2018-01-01 PROCEDURE — 1101F PT FALLS ASSESS-DOCD LE1/YR: CPT | Performed by: FAMILY MEDICINE

## 2018-01-01 PROCEDURE — 1123F ACP DISCUSS/DSCN MKR DOCD: CPT | Performed by: INTERNAL MEDICINE

## 2018-01-01 PROCEDURE — G8417 CALC BMI ABV UP PARAM F/U: HCPCS | Performed by: INTERNAL MEDICINE

## 2018-01-01 PROCEDURE — 7100000011 HC PHASE II RECOVERY - ADDTL 15 MIN: Performed by: INTERNAL MEDICINE

## 2018-01-01 PROCEDURE — 71250 CT THORAX DX C-: CPT

## 2018-01-01 PROCEDURE — G8400 PT W/DXA NO RESULTS DOC: HCPCS | Performed by: INTERNAL MEDICINE

## 2018-01-01 PROCEDURE — 88305 TISSUE EXAM BY PATHOLOGIST: CPT

## 2018-01-01 PROCEDURE — 77065 DX MAMMO INCL CAD UNI: CPT

## 2018-01-01 PROCEDURE — G8427 DOCREV CUR MEDS BY ELIG CLIN: HCPCS | Performed by: FAMILY MEDICINE

## 2018-01-01 PROCEDURE — 76641 ULTRASOUND BREAST COMPLETE: CPT

## 2018-01-01 PROCEDURE — 1123F ACP DISCUSS/DSCN MKR DOCD: CPT | Performed by: FAMILY MEDICINE

## 2018-01-01 PROCEDURE — 3700000000 HC ANESTHESIA ATTENDED CARE: Performed by: INTERNAL MEDICINE

## 2018-01-01 PROCEDURE — 43239 EGD BIOPSY SINGLE/MULTIPLE: CPT | Performed by: INTERNAL MEDICINE

## 2018-01-01 PROCEDURE — 93306 TTE W/DOPPLER COMPLETE: CPT

## 2018-01-01 PROCEDURE — 2580000003 HC RX 258: Performed by: INTERNAL MEDICINE

## 2018-01-01 PROCEDURE — G8417 CALC BMI ABV UP PARAM F/U: HCPCS | Performed by: FAMILY MEDICINE

## 2018-01-01 PROCEDURE — 6360000002 HC RX W HCPCS: Performed by: NURSE ANESTHETIST, CERTIFIED REGISTERED

## 2018-01-01 PROCEDURE — 3609012400 HC EGD TRANSORAL BIOPSY SINGLE/MULTIPLE: Performed by: INTERNAL MEDICINE

## 2018-01-01 PROCEDURE — 7100000010 HC PHASE II RECOVERY - FIRST 15 MIN: Performed by: INTERNAL MEDICINE

## 2018-01-01 PROCEDURE — 2500000003 HC RX 250 WO HCPCS: Performed by: NURSE ANESTHETIST, CERTIFIED REGISTERED

## 2018-01-01 PROCEDURE — 83036 HEMOGLOBIN GLYCOSYLATED A1C: CPT | Performed by: FAMILY MEDICINE

## 2018-01-01 PROCEDURE — G8484 FLU IMMUNIZE NO ADMIN: HCPCS | Performed by: FAMILY MEDICINE

## 2018-01-01 PROCEDURE — 1036F TOBACCO NON-USER: CPT | Performed by: FAMILY MEDICINE

## 2018-01-01 RX ORDER — PROPOFOL 10 MG/ML
INJECTION, EMULSION INTRAVENOUS PRN
Status: DISCONTINUED | OUTPATIENT
Start: 2018-01-01 | End: 2018-01-01 | Stop reason: SDUPTHER

## 2018-01-01 RX ORDER — LOSARTAN POTASSIUM 100 MG/1
TABLET ORAL
Qty: 90 TABLET | Refills: 1 | Status: SHIPPED | OUTPATIENT
Start: 2018-01-01 | End: 2019-01-01 | Stop reason: SDUPTHER

## 2018-01-01 RX ORDER — SODIUM CHLORIDE, SODIUM LACTATE, POTASSIUM CHLORIDE, CALCIUM CHLORIDE 600; 310; 30; 20 MG/100ML; MG/100ML; MG/100ML; MG/100ML
INJECTION, SOLUTION INTRAVENOUS CONTINUOUS
Status: DISCONTINUED | OUTPATIENT
Start: 2018-01-01 | End: 2018-01-01 | Stop reason: HOSPADM

## 2018-01-01 RX ORDER — DILTIAZEM HYDROCHLORIDE 90 MG/1
CAPSULE, EXTENDED RELEASE ORAL
Qty: 180 CAPSULE | Refills: 1 | Status: SHIPPED | OUTPATIENT
Start: 2018-01-01 | End: 2019-01-01

## 2018-01-01 RX ORDER — ATORVASTATIN CALCIUM 40 MG/1
TABLET, FILM COATED ORAL
Qty: 90 TABLET | Refills: 1 | Status: SHIPPED | OUTPATIENT
Start: 2018-01-01 | End: 2019-01-01 | Stop reason: SDUPTHER

## 2018-01-01 RX ORDER — LIDOCAINE HYDROCHLORIDE 20 MG/ML
INJECTION, SOLUTION INFILTRATION; PERINEURAL PRN
Status: DISCONTINUED | OUTPATIENT
Start: 2018-01-01 | End: 2018-01-01 | Stop reason: SDUPTHER

## 2018-01-01 RX ADMIN — SODIUM CHLORIDE, POTASSIUM CHLORIDE, SODIUM LACTATE AND CALCIUM CHLORIDE: 600; 310; 30; 20 INJECTION, SOLUTION INTRAVENOUS at 09:21

## 2018-01-01 RX ADMIN — PROPOFOL 50 MG: 10 INJECTION, EMULSION INTRAVENOUS at 09:55

## 2018-01-01 RX ADMIN — PROPOFOL 60 MG: 10 INJECTION, EMULSION INTRAVENOUS at 09:50

## 2018-01-01 RX ADMIN — LIDOCAINE HYDROCHLORIDE 100 MG: 20 INJECTION, SOLUTION INFILTRATION; PERINEURAL at 09:50

## 2018-01-01 ASSESSMENT — PATIENT HEALTH QUESTIONNAIRE - PHQ9
1. LITTLE INTEREST OR PLEASURE IN DOING THINGS: 0
SUM OF ALL RESPONSES TO PHQ9 QUESTIONS 1 & 2: 0
SUM OF ALL RESPONSES TO PHQ QUESTIONS 1-9: 0
2. FEELING DOWN, DEPRESSED OR HOPELESS: 0
SUM OF ALL RESPONSES TO PHQ QUESTIONS 1-9: 0

## 2018-01-01 ASSESSMENT — PAIN SCALES - GENERAL
PAINLEVEL_OUTOF10: 0

## 2018-01-01 ASSESSMENT — ENCOUNTER SYMPTOMS
SHORTNESS OF BREATH: 0
ABDOMINAL PAIN: 0
NAUSEA: 0
VOMITING: 0
EYE REDNESS: 0
DIARRHEA: 0
CONSTIPATION: 0
EYE DISCHARGE: 0
COUGH: 0
BLOOD IN STOOL: 0
FACIAL SWELLING: 0

## 2018-01-01 ASSESSMENT — PAIN - FUNCTIONAL ASSESSMENT: PAIN_FUNCTIONAL_ASSESSMENT: 0-10

## 2018-01-29 ENCOUNTER — TELEPHONE (OUTPATIENT)
Dept: FAMILY MEDICINE CLINIC | Age: 83
End: 2018-01-29

## 2018-02-08 ENCOUNTER — TELEPHONE (OUTPATIENT)
Dept: FAMILY MEDICINE CLINIC | Age: 83
End: 2018-02-08

## 2018-02-27 DIAGNOSIS — N18.9 CHRONIC RENAL IMPAIRMENT, UNSPECIFIED CKD STAGE: Primary | ICD-10-CM

## 2018-03-15 ENCOUNTER — HOSPITAL ENCOUNTER (OUTPATIENT)
Dept: LAB | Age: 83
Discharge: HOME | End: 2018-03-15
Payer: MEDICARE

## 2018-03-15 ENCOUNTER — OFFICE VISIT (OUTPATIENT)
Dept: CARDIOLOGY | Age: 83
End: 2018-03-15
Payer: MEDICARE

## 2018-03-15 VITALS
WEIGHT: 171.6 LBS | DIASTOLIC BLOOD PRESSURE: 83 MMHG | RESPIRATION RATE: 20 BRPM | HEART RATE: 85 BPM | OXYGEN SATURATION: 96 % | BODY MASS INDEX: 31.58 KG/M2 | SYSTOLIC BLOOD PRESSURE: 132 MMHG | HEIGHT: 62 IN

## 2018-03-15 DIAGNOSIS — E78.5 HYPERLIPIDEMIA, UNSPECIFIED HYPERLIPIDEMIA TYPE: ICD-10-CM

## 2018-03-15 DIAGNOSIS — N18.9 CHRONIC RENAL IMPAIRMENT, UNSPECIFIED CKD STAGE: ICD-10-CM

## 2018-03-15 DIAGNOSIS — I47.1 PAROXYSMAL ATRIAL TACHYCARDIA (HCC): ICD-10-CM

## 2018-03-15 DIAGNOSIS — Z79.01 CHRONIC ANTICOAGULATION: ICD-10-CM

## 2018-03-15 DIAGNOSIS — I10 HYPERTENSION, UNSPECIFIED TYPE: ICD-10-CM

## 2018-03-15 DIAGNOSIS — I48.0 PAROXYSMAL ATRIAL FIBRILLATION (HCC): Primary | ICD-10-CM

## 2018-03-15 LAB
ANION GAP SERPL CALCULATED.3IONS-SCNC: 15 MMOL/L (ref 9–17)
BUN BLDV-MCNC: 21 MG/DL (ref 8–23)
BUN/CREAT BLD: 20 (ref 9–20)
CALCIUM SERPL-MCNC: 9.6 MG/DL (ref 8.6–10.4)
CHLORIDE BLD-SCNC: 103 MMOL/L (ref 98–107)
CO2: 22 MMOL/L (ref 20–31)
CREAT SERPL-MCNC: 1.05 MG/DL (ref 0.5–0.9)
GFR AFRICAN AMERICAN: >60 ML/MIN
GFR NON-AFRICAN AMERICAN: 50 ML/MIN
GFR SERPL CREATININE-BSD FRML MDRD: ABNORMAL ML/MIN/{1.73_M2}
GFR SERPL CREATININE-BSD FRML MDRD: ABNORMAL ML/MIN/{1.73_M2}
GLUCOSE BLD-MCNC: 151 MG/DL (ref 70–99)
POTASSIUM SERPL-SCNC: 4.3 MMOL/L (ref 3.7–5.3)
SODIUM BLD-SCNC: 140 MMOL/L (ref 135–144)

## 2018-03-15 PROCEDURE — 1036F TOBACCO NON-USER: CPT | Performed by: INTERNAL MEDICINE

## 2018-03-15 PROCEDURE — G8400 PT W/DXA NO RESULTS DOC: HCPCS | Performed by: INTERNAL MEDICINE

## 2018-03-15 PROCEDURE — G8484 FLU IMMUNIZE NO ADMIN: HCPCS | Performed by: INTERNAL MEDICINE

## 2018-03-15 PROCEDURE — 99213 OFFICE O/P EST LOW 20 MIN: CPT | Performed by: INTERNAL MEDICINE

## 2018-03-15 PROCEDURE — 80048 BASIC METABOLIC PNL TOTAL CA: CPT

## 2018-03-15 PROCEDURE — G8427 DOCREV CUR MEDS BY ELIG CLIN: HCPCS | Performed by: INTERNAL MEDICINE

## 2018-03-15 PROCEDURE — 36415 COLL VENOUS BLD VENIPUNCTURE: CPT

## 2018-03-15 PROCEDURE — 1090F PRES/ABSN URINE INCON ASSESS: CPT | Performed by: INTERNAL MEDICINE

## 2018-03-15 PROCEDURE — G8417 CALC BMI ABV UP PARAM F/U: HCPCS | Performed by: INTERNAL MEDICINE

## 2018-03-15 PROCEDURE — 1123F ACP DISCUSS/DSCN MKR DOCD: CPT | Performed by: INTERNAL MEDICINE

## 2018-03-15 PROCEDURE — 4040F PNEUMOC VAC/ADMIN/RCVD: CPT | Performed by: INTERNAL MEDICINE

## 2018-03-15 NOTE — PROGRESS NOTES
atorvastatin (LIPITOR) 40 MG tablet TAKE 1 TABLET EVERY DAY 90 tablet 1    losartan (COZAAR) 100 MG tablet TAKE 1 TABLET EVERY DAY 90 tablet 1    fluticasone (FLONASE) 50 MCG/ACT nasal spray 1 spray by Nasal route daily (Patient taking differently: 1 spray by Nasal route daily as needed ) 1 Bottle 3    diphenhydrAMINE (BENADRYL) 25 MG tablet   Take 25 mg by mouth nightly as needed for Sleep       Multiple Vitamins-Minerals (THERAPEUTIC MULTIVITAMIN-MINERALS) tablet Take 1 tablet by mouth every other day      omeprazole (PRILOSEC) 10 MG capsule Take 10 mg by mouth daily.  acetaminophen (TYLENOL) 500 MG tablet Take 1,000 mg by mouth every 6 hours as needed.  Calcium Carbonate-Vitamin D (CALCIUM 600 + D PO) Take 1 tablet by mouth daily.  Multiple Vitamins-Minerals (OCUVITE PRESERVISION PO) Take  by mouth daily. REVIEW OF SYSTEMS:    CONSTITUTIONAL: No major weight gain or loss, fatigue, weakness, night sweats or fever. HEENT: No new vision difficulties or ringing in the ears. RESPIRATORY: See HPI  CARDIOVASCULAR: See HPI  GI: No nausea, vomiting, diarrhea, constipation, abdominal pain or changes in bowel habits. : No urinary frequency, urgency, incontinence hematuria or dysuria. SKIN: No cyanosis or skin lesions. MUSCULOSKELETAL: No new muscle or joint pain. NEUROLOGICAL: No syncope or TIA-like symptoms. PSYCHIATRIC: No anxiety, pain, insomnia or depression    Objective:     PHYSICAL EXAM:      VITALS:    /83 (Site: Left Arm, Position: Sitting, Cuff Size: Large Adult)   Pulse 85   Resp 20   Ht 5' 2\" (1.575 m)   Wt 171 lb 9.6 oz (77.8 kg)   SpO2 96%   BMI 31.39 kg/m²   CONSTITUTIONAL: Cooperative, no apparent distress, and appears well nourished / developed. NEUROLOGIC:  Awake and orientated to person, place and time. HEENT: Sclera non-icteric, normocephalic, neck supple, no elevation of JVP, normal carotid pulses with no bruits and thyroid normal size.   LUNGS:  No and told her to stop the medication of this occurs and call our office if this occurs. · ACE Inibitor/ARB: Continue losartan (Cozaar) 100 mg once daily. I also discussed the potential side effects of this medication including lightheadedness and dizziness and told her to stop the medication of this occurs and call our office if this occurs. · Antiplatelet Agent: History of peptic ulcer not taking aspirin because of this. · Stroke Risk: Her CHADS2 score is 5/9(6.7% stroke risk)  · Anticoagulation: Continue Apixaban (Eliquis) 2.5 mg every 12 hours. I also reminded her to watch for signs of blood in her stool or black tarry stools and stop the medication immediately if this develops as this could be life threatening. · Hyperlipidemia: Mixed  · Statin Therapy: Continue atorvastatin (Lipitor) 40 mg nightly. I discussed the potential benefits of statin therapy as well as the potential risks including myalgia as well as the rare but potentially serious complication of liver or kidney damage. Although rare, I told her that this could be serious and therefore told her to stop the medication immediately and call if she developed any severe muscle aches or pains and she agreed to do so. · PCSK9 inhibitors: Not indicated at this time    Essential Hypertension: Controlled  · ACE Inibitor/ARB: Continue losartan (Cozaar) 100 mg once daily I discussed the potential side effects of this medication including lightheadedness and dizziness and told her to call the office if this occurs. · Calcium Channel Blocker: Continue [id number] mg daily I discussed the potential side effects of this medication including lightheadedness and dizziness and told her to call the office if this occurs. Continue all other medications. I reminded her to try and keep herself well-hydrated. Return in about 6 months (around 9/15/2018) for Follow up. Sincerely,  Afia Tran MD, F.A.C.C.   [facility] Specialist

## 2018-03-29 ENCOUNTER — TELEPHONE (OUTPATIENT)
Dept: FAMILY MEDICINE CLINIC | Age: 83
End: 2018-03-29

## 2018-03-29 DIAGNOSIS — N28.9 RENAL INSUFFICIENCY: Primary | ICD-10-CM

## 2018-04-11 ENCOUNTER — HOSPITAL ENCOUNTER (OUTPATIENT)
Dept: ULTRASOUND IMAGING | Age: 83
Discharge: HOME | End: 2018-04-13
Payer: MEDICARE

## 2018-04-11 ENCOUNTER — OFFICE VISIT (OUTPATIENT)
Dept: ONCOLOGY | Age: 83
End: 2018-04-11
Payer: MEDICARE

## 2018-04-11 ENCOUNTER — HOSPITAL ENCOUNTER (OUTPATIENT)
Dept: INTERVENTIONAL RADIOLOGY/VASCULAR | Age: 83
Discharge: HOME | End: 2018-04-13
Payer: MEDICARE

## 2018-04-11 ENCOUNTER — HOSPITAL ENCOUNTER (OUTPATIENT)
Dept: CT IMAGING | Age: 83
Discharge: HOME | End: 2018-04-13
Payer: MEDICARE

## 2018-04-11 VITALS
DIASTOLIC BLOOD PRESSURE: 89 MMHG | RESPIRATION RATE: 22 BRPM | TEMPERATURE: 98.7 F | HEART RATE: 76 BPM | WEIGHT: 171 LBS | BODY MASS INDEX: 31.47 KG/M2 | HEIGHT: 62 IN | SYSTOLIC BLOOD PRESSURE: 154 MMHG

## 2018-04-11 DIAGNOSIS — C50.411 MALIGNANT NEOPLASM OF UPPER-OUTER QUADRANT OF RIGHT BREAST IN FEMALE, ESTROGEN RECEPTOR NEGATIVE (HCC): ICD-10-CM

## 2018-04-11 DIAGNOSIS — C50.411 MALIGNANT NEOPLASM OF UPPER-OUTER QUADRANT OF RIGHT BREAST IN FEMALE, ESTROGEN RECEPTOR NEGATIVE (HCC): Primary | ICD-10-CM

## 2018-04-11 DIAGNOSIS — R92.8 ABNORMAL MAMMOGRAM: ICD-10-CM

## 2018-04-11 DIAGNOSIS — I48.0 PAROXYSMAL ATRIAL FIBRILLATION (HCC): ICD-10-CM

## 2018-04-11 DIAGNOSIS — N63.20 LEFT BREAST MASS: ICD-10-CM

## 2018-04-11 DIAGNOSIS — Z17.1 MALIGNANT NEOPLASM OF UPPER-OUTER QUADRANT OF RIGHT BREAST IN FEMALE, ESTROGEN RECEPTOR NEGATIVE (HCC): ICD-10-CM

## 2018-04-11 DIAGNOSIS — Z17.1 MALIGNANT NEOPLASM OF UPPER-OUTER QUADRANT OF RIGHT BREAST IN FEMALE, ESTROGEN RECEPTOR NEGATIVE (HCC): Primary | ICD-10-CM

## 2018-04-11 PROCEDURE — 1090F PRES/ABSN URINE INCON ASSESS: CPT | Performed by: INTERNAL MEDICINE

## 2018-04-11 PROCEDURE — 71250 CT THORAX DX C-: CPT

## 2018-04-11 PROCEDURE — 99214 OFFICE O/P EST MOD 30 MIN: CPT | Performed by: INTERNAL MEDICINE

## 2018-04-11 PROCEDURE — G8427 DOCREV CUR MEDS BY ELIG CLIN: HCPCS | Performed by: INTERNAL MEDICINE

## 2018-04-11 PROCEDURE — G8417 CALC BMI ABV UP PARAM F/U: HCPCS | Performed by: INTERNAL MEDICINE

## 2018-04-11 PROCEDURE — 1123F ACP DISCUSS/DSCN MKR DOCD: CPT | Performed by: INTERNAL MEDICINE

## 2018-04-11 PROCEDURE — 76641 ULTRASOUND BREAST COMPLETE: CPT

## 2018-04-11 PROCEDURE — 4040F PNEUMOC VAC/ADMIN/RCVD: CPT | Performed by: INTERNAL MEDICINE

## 2018-04-11 PROCEDURE — G8400 PT W/DXA NO RESULTS DOC: HCPCS | Performed by: INTERNAL MEDICINE

## 2018-04-11 PROCEDURE — 1036F TOBACCO NON-USER: CPT | Performed by: INTERNAL MEDICINE

## 2018-04-23 ENCOUNTER — OFFICE VISIT (OUTPATIENT)
Dept: FAMILY MEDICINE CLINIC | Age: 83
End: 2018-04-23
Payer: MEDICARE

## 2018-04-23 VITALS
HEART RATE: 63 BPM | OXYGEN SATURATION: 98 % | SYSTOLIC BLOOD PRESSURE: 115 MMHG | BODY MASS INDEX: 31.46 KG/M2 | DIASTOLIC BLOOD PRESSURE: 70 MMHG | WEIGHT: 172 LBS

## 2018-04-23 DIAGNOSIS — E11.9 TYPE 2 DIABETES MELLITUS WITHOUT COMPLICATION, WITHOUT LONG-TERM CURRENT USE OF INSULIN (HCC): Primary | ICD-10-CM

## 2018-04-23 LAB — HBA1C MFR BLD: 6.1 %

## 2018-04-23 PROCEDURE — 4040F PNEUMOC VAC/ADMIN/RCVD: CPT | Performed by: FAMILY MEDICINE

## 2018-04-23 PROCEDURE — 99213 OFFICE O/P EST LOW 20 MIN: CPT | Performed by: FAMILY MEDICINE

## 2018-04-23 PROCEDURE — 1036F TOBACCO NON-USER: CPT | Performed by: FAMILY MEDICINE

## 2018-04-23 PROCEDURE — 1123F ACP DISCUSS/DSCN MKR DOCD: CPT | Performed by: FAMILY MEDICINE

## 2018-04-23 PROCEDURE — 1090F PRES/ABSN URINE INCON ASSESS: CPT | Performed by: FAMILY MEDICINE

## 2018-04-23 PROCEDURE — G8417 CALC BMI ABV UP PARAM F/U: HCPCS | Performed by: FAMILY MEDICINE

## 2018-04-23 PROCEDURE — G8427 DOCREV CUR MEDS BY ELIG CLIN: HCPCS | Performed by: FAMILY MEDICINE

## 2018-04-23 PROCEDURE — 83036 HEMOGLOBIN GLYCOSYLATED A1C: CPT | Performed by: FAMILY MEDICINE

## 2018-04-23 PROCEDURE — G8400 PT W/DXA NO RESULTS DOC: HCPCS | Performed by: FAMILY MEDICINE

## 2018-04-24 RX ORDER — LOSARTAN POTASSIUM 100 MG/1
TABLET ORAL
Qty: 90 TABLET | Refills: 1 | Status: SHIPPED | OUTPATIENT
Start: 2018-04-24 | End: 2018-01-01 | Stop reason: SDUPTHER

## 2018-04-25 ENCOUNTER — OFFICE VISIT (OUTPATIENT)
Dept: ONCOLOGY | Age: 83
End: 2018-04-25
Payer: MEDICARE

## 2018-04-25 VITALS
HEART RATE: 73 BPM | SYSTOLIC BLOOD PRESSURE: 152 MMHG | RESPIRATION RATE: 18 BRPM | TEMPERATURE: 98.2 F | BODY MASS INDEX: 31.47 KG/M2 | HEIGHT: 62 IN | DIASTOLIC BLOOD PRESSURE: 86 MMHG | WEIGHT: 171 LBS

## 2018-04-25 DIAGNOSIS — Z17.1 MALIGNANT NEOPLASM OF UPPER-OUTER QUADRANT OF RIGHT BREAST IN FEMALE, ESTROGEN RECEPTOR NEGATIVE (HCC): Primary | ICD-10-CM

## 2018-04-25 DIAGNOSIS — C50.411 MALIGNANT NEOPLASM OF UPPER-OUTER QUADRANT OF RIGHT BREAST IN FEMALE, ESTROGEN RECEPTOR NEGATIVE (HCC): Primary | ICD-10-CM

## 2018-04-25 PROCEDURE — G8427 DOCREV CUR MEDS BY ELIG CLIN: HCPCS | Performed by: INTERNAL MEDICINE

## 2018-04-25 PROCEDURE — G8400 PT W/DXA NO RESULTS DOC: HCPCS | Performed by: INTERNAL MEDICINE

## 2018-04-25 PROCEDURE — 1036F TOBACCO NON-USER: CPT | Performed by: INTERNAL MEDICINE

## 2018-04-25 PROCEDURE — 1123F ACP DISCUSS/DSCN MKR DOCD: CPT | Performed by: INTERNAL MEDICINE

## 2018-04-25 PROCEDURE — 99214 OFFICE O/P EST MOD 30 MIN: CPT | Performed by: INTERNAL MEDICINE

## 2018-04-25 PROCEDURE — G8417 CALC BMI ABV UP PARAM F/U: HCPCS | Performed by: INTERNAL MEDICINE

## 2018-04-25 PROCEDURE — 1090F PRES/ABSN URINE INCON ASSESS: CPT | Performed by: INTERNAL MEDICINE

## 2018-04-25 PROCEDURE — 4040F PNEUMOC VAC/ADMIN/RCVD: CPT | Performed by: INTERNAL MEDICINE

## 2018-05-06 ASSESSMENT — ENCOUNTER SYMPTOMS
COUGH: 0
COLOR CHANGE: 0
PHOTOPHOBIA: 0
ABDOMINAL PAIN: 0
NAUSEA: 0
TROUBLE SWALLOWING: 0
ABDOMINAL DISTENTION: 0
SHORTNESS OF BREATH: 0
WHEEZING: 0
VOMITING: 0
DIARRHEA: 0
BACK PAIN: 0
CONSTIPATION: 0

## 2018-05-07 RX ORDER — DILTIAZEM HYDROCHLORIDE 90 MG/1
CAPSULE, EXTENDED RELEASE ORAL
Qty: 180 CAPSULE | Refills: 1 | Status: SHIPPED | OUTPATIENT
Start: 2018-05-07 | End: 2018-01-01 | Stop reason: SDUPTHER

## 2018-06-29 RX ORDER — ATORVASTATIN CALCIUM 40 MG/1
TABLET, FILM COATED ORAL
Qty: 90 TABLET | Refills: 1 | Status: SHIPPED | OUTPATIENT
Start: 2018-06-29 | End: 2018-01-01 | Stop reason: SDUPTHER

## 2018-09-10 NOTE — PROGRESS NOTES
palpitations, dizziness or lightheadedness. No problems with medications. EKG done today in office 9/10/18 shows sinus rhythm with first degree AV block and RBBB    Past Medical History:    Past Medical History:   Diagnosis Date    Cancer ([facility] [address] 2015    right breast    Diabetes mellitus ([facility] [address]     Duodenal ulcer     Esophageal hernia     Gastric ulcer with hemorrhage     H/O echocardiogram 2016    EF >65%. LV wall thickness is midly increased. Moderate mitral leaflet thickening with moderate posterior mitral valve leaflet prolapse. Mild mitral regurgitation. Moderate mitral valve stenosis mean gradient  of 6.4 mmHg. Moderate (ferade ll) diastolic dysfunction.  Hiatal hernia     History of Holter monitoring 2016    Frequent PAC's with multiple short runs of what appears to be atrial fib.  including with RVR at times as fast as 160 bpm,    Hyperlipidemia     Hypertension     Macular degeneration     PONV (postoperative nausea and vomiting)     Stress fracture of lumbar vertebra     from coughing; dx last week     Past Surgical History:  Past Surgical History:   Procedure Laterality Date    ABDOMEN SURGERY  2013    duodenal ulcer    BACK SURGERY  2013    cement fusion    BLADDER SUSPENSION      BREAST LUMPECTOMY Right 2015    Dr Willard Matrin, Lumpectomy    CATARACT REMOVAL       SECTION      x1    ENDOSCOPY, COLON, DIAGNOSTIC      EYE SURGERY      OTHER SURGICAL HISTORY Right 10/21/15    I&D Rt. breast abcess    TUNNELED VENOUS PORT PLACEMENT Left 2015     Social History:    History   Smoking Status    Former Smoker    Packs/day: 0.25    Years: 4.00   Smokeless Tobacco    Never Used     Comment: quit 50 years ago     Current Medications:  Outpatient Prescriptions Marked as Taking for the 9/10/18 encounter (Office Visit) with Ryanne Kim MD   Medication Sig Dispense Refill    atorvastatin (LIPITOR) 40 MG tablet TAKE 1 TABLET BY MOUTH DAILY 90 tablet 1    metFORMIN (GLUCOPHAGE) 500 MG tablet Take 1 tablet by mouth 2 times daily (with meals) 90 tablet 1    diltiazem (CARDIZEM 12 HR) 90 MG extended release capsule TAKE ONE CAPSULE BY MOUTH TWICE A  capsule 1    losartan (COZAAR) 100 MG tablet TAKE 1 TABLET BY MOUTH DAILY 90 tablet 1    apixaban (ELIQUIS) 2.5 MG TABS tablet Take 1 tablet by mouth 2 times daily 180 tablet 3    fluticasone (FLONASE) 50 MCG/ACT nasal spray 1 spray by Nasal route daily (Patient taking differently: 1 spray by Nasal route daily as needed ) 1 Bottle 3    diphenhydrAMINE (BENADRYL) 25 MG tablet   Take 25 mg by mouth nightly as needed for Sleep       Multiple Vitamins-Minerals (THERAPEUTIC MULTIVITAMIN-MINERALS) tablet Take 1 tablet by mouth every other day      omeprazole (PRILOSEC) 10 MG capsule Take 10 mg by mouth daily.  acetaminophen (TYLENOL) 500 MG tablet Take 1,000 mg by mouth every 6 hours as needed.  Calcium Carbonate-Vitamin D (CALCIUM 600 + D PO) Take 1 tablet by mouth daily.  Multiple Vitamins-Minerals (OCUVITE PRESERVISION PO) Take  by mouth daily. REVIEW OF SYSTEMS:    CONSTITUTIONAL:+ weight loss, no fatigue, weakness, night sweats or fever. HEENT: No new vision difficulties or ringing in the ears. RESPIRATORY: See HPI  CARDIOVASCULAR: See HPI  GI: No nausea, vomiting, diarrhea, constipation, abdominal pain or changes in bowel habits. : No urinary frequency, urgency, incontinence hematuria or dysuria. SKIN: No cyanosis or skin lesions. MUSCULOSKELETAL: No new muscle or joint pain. NEUROLOGICAL: No syncope or TIA-like symptoms.   PSYCHIATRIC: No anxiety, pain, insomnia or depression    Objective:     PHYSICAL EXAM:      VITALS:    /85 (Site: Left Lower Arm, Position: Sitting, Cuff Size: Medium Adult)   Pulse 105   Resp 16   Ht 5' 2\" (1.575 m)   Wt 165 lb 3.2 oz (74.9 kg)   SpO2 97%   BMI 30.22 kg/m²   CONSTITUTIONAL: Cooperative, no apparent distress, and appears well nourished / developed. NEUROLOGIC:  Awake and orientated to person, place and time. HEENT: Sclera non-icteric, normocephalic, neck supple, no elevation of JVP, normal carotid pulses with no bruits and thyroid normal size. LUNGS:  No increased work of breathing and clear to auscultation, no crackles or wheezing. CARDIOVASCULAR:  Regular rate and rhythm with no gallops, rubs, or abnormal heart sounds. The apical impulses not displaced. 2/6 ejection systolic murmur at the left upper sternal border without radiation. The carotid upstroke is normal in amplitude and contour without delay or bruit. JVP is not elevated. ABDOMEN:  Normal bowel sounds, non-distended and non-tender to palpation. EXT: Trace lower extremity edema. 1/2 way up to the knees bilaterally  no calf tenderness. Pulses are present bilaterally. DATA:    Lab Results   Component Value Date    ALT 9 05/04/2016    AST 18 05/04/2016    ALKPHOS 89 05/04/2016    BILITOT 0.38 05/04/2016     Lab Results   Component Value Date    CREATININE 1.05 (H) 03/15/2018    BUN 21 03/15/2018     03/15/2018    K 4.3 03/15/2018     03/15/2018    CO2 22 03/15/2018     Lab Results   Component Value Date    TSH 2.07 04/18/2014     Lab Results   Component Value Date    WBC 3.8 05/04/2016    HGB 11.6 (L) 05/04/2016    HCT 35.2 (L) 05/04/2016    MCV 89.2 05/04/2016     05/04/2016       Lab Results   Component Value Date    TRIG 196 (H) 02/26/2015    TRIG 165 (H) 10/22/2014    TRIG 265 (H) 04/18/2014     Lab Results   Component Value Date    HDL 44 02/26/2015    HDL 51 10/22/2014    HDL 49 04/18/2014     Lab Results   Component Value Date    LDLCHOLESTEROL 56 02/26/2015    LDLCHOLESTEROL 61 10/22/2014    LDLCHOLESTEROL 160 (H) 04/18/2014       Assessment:      Diagnosis Orders   1. Paroxysmal atrial fibrillation (HCC)  Echo 2D w doppler w color complete   2. Mixed hyperlipidemia     3.  Hypertension, unspecified type  Echo 2D w doppler w color complete 4. Nausea  Marycruz Gordillo MD, [facility]   5. Chronic anticoagulation     6. Weight loss     7. Non-rheumatic mitral valve stenosis     8. Abnormal ECG       Plan:   · Paroxysmal Atrial Fibrillation: Rate Control. Holter monitor on 7/1/2016 which showed sinus rhythm with frequent APCs and short atrial runs. · Calcium Channel Blocker: Continue diltiazem 90 mg twice daily. I also discussed the potential side effects of this medication including lightheadedness and dizziness and told her to stop the medication of this occurs and call our office if this occurs. · ACE Inibitor/ARB: Continue losartan (Cozaar) 100 mg once daily. I also discussed the potential side effects of this medication including lightheadedness and dizziness and told her to stop the medication of this occurs and call our office if this occurs. · Antiplatelet Agent: History of peptic ulcer not taking aspirin because of this. · Stroke Risk: Her CHADS2 score is 5/9(6.7% stroke risk)  · Anticoagulation: Continue Apixaban (Eliquis) 2.5 mg every 12 hours. I also reminded her to watch for signs of blood in her stool or black tarry stools and stop the medication immediately if this develops as this could be life threatening. · EKG done today maintaining sinus rhythm    · Hyperlipidemia: Mixed  · Statin Therapy: Continue atorvastatin (Lipitor) 40 mg nightly. I discussed the potential benefits of statin therapy as well as the potential risks including myalgia as well as the rare but potentially serious complication of liver or kidney damage. Although rare, I told her that this could be serious and therefore told her to stop the medication immediately and call if she developed any severe muscle aches or pains and she agreed to do so.    · PCSK9 inhibitors: Not indicated at this time    Essential Hypertension: Controlled  · ACE Inibitor/ARB: Continue losartan (Cozaar) 100 mg once daily I discussed the potential side effects of this medication including lightheadedness and dizziness and told her to call the office if this occurs. · Calcium Channel Blocker: Continue [id number] mg daily I discussed the potential side effects of this medication including lightheadedness and dizziness and told her to call the office if this occurs. · Referral to Dr Maura Garcia for a GI consult due to nausea, poor appetite and weight loss. · Mitral stenosis: probably degenerative in etiology. I will get a repeat echo for follow-up. FOLLOW UP:  I told Ms. Govea to call my office if she had any problems, but otherwise told her to Return in about 3 months (around 12/10/2018). However, I would be happy to see her sooner should the need arise. Sincerely,  Marvin Cortes MD, F.A.C.C. [facility] Cardiology Specialist    [address]  Phone: [phone], Fax: [phone]     I believe that the risk of significant morbidity and mortality related to the patient's current medical conditions are: Intermediate. The documentation recorded by the scribe, accurately and completely reflects the services I personally performed and the decisions made by me. Ev Brooks MD, F.A.C.C.  September 10, 2018

## 2018-09-12 PROBLEM — R91.1 LUNG NODULE: Status: ACTIVE | Noted: 2018-01-01

## 2018-09-13 NOTE — PROGRESS NOTES
Chief Complaint   Patient presents with    Breast Cancer     having sorness on her leftside of breast where lymph nodes were removed Scheduled to see Gastro in Oct.     Weight Loss    Nausea    Neck Pain     Pertinent clinical problems/treatment:    1. Right-sided breast cancer, T2 N1 M0, ER negative/TX negative/HER-2 negative  2. Imaging studies including PET scan, no distant metastatic disease  3. Neoadjuvant chemo, weekly carbotaxol, started  June 23, 2015. C2 on 7/14, C3 8/4/15, then stopped   4. S/p lumpectomy and node dissection:pT2N2, multiple positive lymph nodes, September 15, 2015  5. Adjuvant radiation       Summary of the case    Ms. Mary Hanley is a very pleasant 80 y.o. female . She was doing fine until earlier this year, when she felt a mass int he upper outer quadrant of her right breast. The lump did not change after a few months, so she consulted with her doctor. Mammogram was done on 5/11/15 and showed large mass at the 10:00 position with spiculated margins. Ultrasound shortly followed and showed 3.4 cm mass at the 10:00 position with irregular margins. At least one large axillary node was appreciated. The patient did not have mammograms for many years before this. Biopsy was done and showed invasive ductal carcinoma, measuring at least 1.6 cm in the bx specimen. The tumor was ER/TX and HER 2 negative (triple negative ) . Clinical stage is T2N1M0. The patient underwent neoadjuvant chemotherapy, received 3 cycles, after that she underwent lumpectomy and node dissection. Pathologically the tumor was staged at T2 N2 M0. About 9 axillary lymph nodes were appreciated. After surgery, she had problem with tissue healing. She was managed conservatively. The wound slowly healed by secondary intention. After that we decided to proceed with radiation. And she was observed after that. Interim history:    The patient is here for unscheduled visit.   She felt a lump in the outer

## 2018-10-17 NOTE — PROGRESS NOTES
ulcer with hemorrhage     H/O echocardiogram 06/28/2016    EF >65%. LV wall thickness is midly increased. Moderate mitral leaflet thickening with moderate posterior mitral valve leaflet prolapse. Mild mitral regurgitation. Moderate mitral valve stenosis mean gradient  of 6.4 mmHg. Moderate (ferade ll) diastolic dysfunction.  Hiatal hernia     History of Holter monitoring 06/30/2016    Frequent PAC's with multiple short runs of what appears to be atrial fib. including with RVR at times as fast as 160 bpm,    Hyperlipidemia     Hypertension     Macular degeneration     PONV (postoperative nausea and vomiting)     Stress fracture of lumbar vertebra     from coughing; dx last week        Past Surgical History:   Procedure Laterality Date    BACK SURGERY  09/2013    cement fusion    BLADDER SUSPENSION      BREAST LUMPECTOMY Right 09/16/2015    Dr Sam Bowens, [facility] Salguero      x1    EYE SURGERY      OTHER SURGICAL HISTORY Right 10/21/15    I&D Rt. breast abcess    STOMACH SURGERY  07/2013    In [facility] gastric ulcer-perforation-repair    TUNNELED VENOUS PORT PLACEMENT Left 06/17/2015    UPPER GASTROINTESTINAL ENDOSCOPY  2013        Family History   Problem Relation Age of Onset    Osteoporosis Mother     Heart Disease Mother     Macular Degen Father     Heart Disease Father        Social History     Social History    Marital status:      Spouse name: N/A    Number of children: N/A    Years of education: N/A     Occupational History    Not on file.      Social History Main Topics    Smoking status: Former Smoker     Packs/day: 0.25     Years: 4.00     Quit date: 1/1/1958    Smokeless tobacco: Never Used      Comment: quit 50 years ago    Alcohol use No    Drug use: No    Sexual activity: Not on file     Other Topics Concern    Not on file     Social History Narrative    No narrative on file       Outpatient Prescriptions Marked as Taking for Completed and, except as mentioned above, was negative or noncontributory  Breast:  Completed and, except as mentioned above, was negative or noncontributory  Respiratory:  Completed and, except as mentioned above, was negative or noncontributory  Cardiovascular:  Completed and, except as mentioned above, was negative or noncontributory  Gastrointestinal: See HPI  Genito-Urinary:  Completed and, except as mentioned above, was negative or noncontributory  Musculoskeletal:  Completed and, except as mentioned above, was negative or noncontributory  Neurological:  Completed and, except as mentioned above, was negative or noncontributory  Dermatological:  Completed and, except as mentioned above, was negative or noncontributory      Physical exam:  Constitutional - Well-developed, well-nourished, overweight, in no acute distress. Vital signs, height and weight are as documented above.     Mental Status /Psychiatric - alert, oriented to person, place, and time, normal mood, behavior, speech, dress, motor activity, and thought processes    Eyes - pupils equal and reactive, extraocular eye movements intact, sclera anicteric, conjunctiva and eyelids normal    Neck - supple, no significant adenopathy, trachea midline, thyroid not enlarged     Respiratory - clear to auscultation, no wheezes, rales or rhonchi, symmetric air entry, no tachypnea, retractions or cyanosis, no evidence of increased respiratory effort     Cardiovascular - normal rate, regular rhythm, normal S1, S2, no murmurs, rubs, clicks or gallops, normal bilateral carotid upstroke without bruits, no JVD     Gastrointestinal - soft, nontender, nondistended, no masses or organomegaly, bowel sounds normal, no hernias noted     Musculoskeletal - no joint tenderness, deformity or swelling, no muscular tenderness noted, normal range of motion     Extremities - peripheral pulses normal, no pedal edema, no clubbing or cyanosis     Skin - normal coloration and turgor, no rashes, no suspicious skin lesions noted    Her nausea and apparently transient weight loss could be related to metformin, active peptic ulcer disease or an exacerbation of GERD. Neoplasm is a possibility but unlikely, in my opinion. She will undergo EGD for further evaluation. We will need to hold her Eliquis prior to the procedure which has been approved by her cardiologist, Dr. Kang Torres. Depending on the results of the endoscopy, treatment modification may or may not be necessary. Alternatives, risks and benefits of this approach were discussed with the patient who understands and agrees. Please note that portions of this note were completed with a voice recognition program. Efforts were made to edit the dictation but occasionally words are mis-transcribed.

## 2018-10-25 PROBLEM — E78.00 PURE HYPERCHOLESTEROLEMIA: Status: ACTIVE | Noted: 2018-01-01

## 2018-10-25 NOTE — PATIENT INSTRUCTIONS
SURVEY:    You may be receiving a survey from [employer] regarding your visit today. Please complete the survey to enable us to provide the highest quality of care to you and your family. If you cannot score us a very good on any question, please call the office to discuss how we could have made your experience a very good one. Thank you.

## 2018-10-25 NOTE — PROGRESS NOTES
Patient voiced understanding. Quality Measures    Body mass index is 30.73 kg/m². Elevated. Weight control planned discussed Healthy diet and regular exercise. BP: 120/80. Blood pressure is normal. Treatment plan consists of No treatment change needed. Fall Risk 10/25/2018 5/11/2015 4/24/2014   2 or more falls in past year? no no no   Fall with injury in past year? no no no     The patient does not have a history of falls. I did not - not indicated , complete a risk assessment for falls. A plan of care for falls No Treatment plan indicated    Lab Results   Component Value Date    LDLCHOLESTEROL 56 02/26/2015    (goal LDL reduction with dx if diabetes is 50% LDL reduction)    PHQ Scores 10/25/2018 5/11/2015 5/11/2015 1/24/2014   PHQ2 Score 0 0 0 0   PHQ9 Score 0 0 0 0     Interpretation of Total Score Depression Severity: 1-4 = Minimal depression, 5-9 = Mild depression, 10-14 = Moderate depression, 15-19 = Moderately severe depression, 20-27 = Severe depression        Return in about 6 months (around 4/25/2019) for DM, HTN, Hyperlipidemia.       Electronically signed by Sarah Miller MD on 10/25/2018 at 1:13 PM

## 2018-10-26 PROBLEM — R11.0 NAUSEA: Status: ACTIVE | Noted: 2018-01-01

## 2018-11-27 NOTE — OP NOTE
PROCEDURE NOTE    DATE OF PROCEDURE: 11/27/2018     ENDOSCOPIST: Wade Christine. Steph Longoria MD, [facility]    ASSISTANT: None    PREOPERATIVE DIAGNOSIS: Nausea, weight loss    POSTOPERATIVE DIAGNOSIS: -5 cm hiatal hernia. , -Antral/duodenal deformity    OPERATION: EGD with biopsy    ANESTHESIA: MAC     ESTIMATED BLOOD LOSS:  Minimal    COMPLICATIONS: None. SPECIMENS: were obtained    HISTORY: The patient is a 80y.o. year old female with history of above preop diagnosis. Esophagogastroduodenoscopy with possible biopsy and dilation has been recommended. I explained the risk, benefits, expected outcome, and alternatives to the procedure. Risks include but are not limited to bleeding, infection, respiratory distress, hypotension, and perforation of the esophagus, stomach, or duodenum. Patient understands and is in agreement. PROCEDURE: The patient was given monitored anesthesia care. The patient was given oxygen by nasal cannula. The endoscope was inserted orally and advanced under direct vision through the esophagus, through the stomach, through the pylorus, and into the descending duodenum. Findings:  Duodenum:     Descending: normal    Bulb: abnormal: Postsurgical deformity    Stomach:    Antrum: abnormal: Postsurgical deformity, biopsied for H. pylori    Body: normal    Fundus: normal    Esophagus: abnormal: Hiatal hernia    The scope was removed and the patient tolerated the procedure well.        Electronically signed by Christy Ovalles MD  on 11/27/2018 at 10:11 AM

## 2018-11-27 NOTE — H&P
History and Physical    Patient's Name/Date of Birth: Jerald Jiang / 1934 (62 y.o.)    Date: November 27, 2018     CHIEF COMPLAINT:   nausea, weight loss    Past Medical History:   Diagnosis Date    Cancer ([facility] [address] 05/2015    right breast    Diabetes mellitus ([facility] [address]     Duodenal ulcer with hemorrhage     Esophageal hernia     H/O echocardiogram 06/28/2016    EF >65%. LV wall thickness is midly increased. Moderate mitral leaflet thickening with moderate posterior mitral valve leaflet prolapse. Mild mitral regurgitation. Moderate mitral valve stenosis mean gradient  of 6.4 mmHg. Moderate (ferade ll) diastolic dysfunction.  Hiatal hernia     History of Holter monitoring 06/30/2016    Frequent PAC's with multiple short runs of what appears to be atrial fib.  including with RVR at times as fast as 160 bpm,    Hyperlipidemia     Hypertension     Macular degeneration     PONV (postoperative nausea and vomiting)     Stress fracture of lumbar vertebra     from coughing; dx last week     Past Surgical History:   Procedure Laterality Date    BACK SURGERY  09/2013    cement fusion    BLADDER SUSPENSION      BREAST LUMPECTOMY Right 09/16/2015    Dr Sam Bowens, [facility] Salguero      x1    EYE SURGERY      OTHER SURGICAL HISTORY Right 10/21/15    I&D Rt. breast abcess    STOMACH SURGERY  07/2013    In [facility] gastric ulcer-perforation-repair    TUNNELED VENOUS PORT PLACEMENT Left 06/17/2015    UPPER GASTROINTESTINAL ENDOSCOPY  2013     Current Facility-Administered Medications   Medication Dose Route Frequency Provider Last Rate Last Dose    lactated ringers infusion   Intravenous Continuous Francsico Parr  mL/hr at 11/27/18 0921       Facility-Administered Medications Ordered in Other Encounters   Medication Dose Route Frequency Provider Last Rate Last Dose    sodium chloride (PF) 0.9 % injection 10 mL  10 mL Intravenous PRN MD Constanza Guerra

## 2018-11-27 NOTE — PROGRESS NOTES
Discharge Criteria    Inpatients must meet Criteria 1 through 7. All other patients are either YES or N/A. If a NO is chosen then Anesthesia or Surgeon must be notified. 1.  Minimum 30 minutes after last dose of sedative medication, minimum 120 minutes after last dose of reversal agent. Yes      2. Systolic BP stable within 20 mmHg for 30 minutes & systolic BP between 90 & 260 or within 10 mmHg of baseline. No-anesthesia aware      3. Pulse between 60 and 100 or within 10 bpm of baseline. Yes      4. Spontaneous respiratory rate >/= 10 per minute. Yes      5. SaO2 >/= 95 or  >/= baseline. Yes      6. Able to cough and swallow or return to baseline function. Yes      7. Alert and oriented or return to baseline mental status. Yes      8. Demonstrates controlled, coordinated movements, ambulates with steady gait, or return to baseline activity function. Yes      9. Minimal or no pain or nausea, or at a level tolerable and acceptable to patient. Yes      10. Takes and retains oral fluids as allowed. Yes      11. Procedural / perioperative site stable. Minimal or no bleeding. Yes          12. If GI endoscopy procedure, minimal or no abdominal distention or passing flatus. N/A      13. Written discharge instructions and emergency telephone number provided. Yes      14. Accompanied by a responsible adult. Yes      Adult patient discharged from facility without responsible person meets above criteria plus the following:   a) remains awake without stimulus for 30 minutes     b) oriented appropriate for age      c) all vital signs stable   d) no significant risk of losing protective reflexes      e) able to maintain pre-procedure mobility without assistance   f) no nausea or dizziness      g) transportation arrangements that do not require patient to operate motor Vehicle.      N/A

## 2018-12-11 NOTE — PROGRESS NOTES
pain, pressure or tightness. No orthopnea or PND. No palpitations, dizziness or lightheadedness. No problems with medications. She sleeps well other than waking up in the middle of the night. Exercise Tolerance: She reports having a a poor exercise tolerance. Ms. Max Dhaliwal says that she can walk less than 1/2 block without developing shortness of breath and/or chest discomfort. Past Medical History:    Past Medical History:   Diagnosis Date    Cancer ([facility] [address] 05/2015    right breast    Diabetes mellitus ([facility] [address]     Duodenal ulcer with hemorrhage     Esophageal hernia     H/O echocardiogram 06/28/2016    EF >65%. LV wall thickness is midly increased. Moderate mitral leaflet thickening with moderate posterior mitral valve leaflet prolapse. Mild mitral regurgitation. Moderate mitral valve stenosis mean gradient  of 6.4 mmHg. Moderate (ferade ll) diastolic dysfunction.  Hiatal hernia     History of echocardiogram 09/24/2018    EF 70%. LV wall thickness is mildly increased. Moderate mitral valve sclerosis w/ stenosis. Mean gradient 4.4. Mild mitral regurg. Evidence of moderate (grade II) diastolic dysfunction seen.  History of Holter monitoring 06/30/2016    Frequent PAC's with multiple short runs of what appears to be atrial fib.  including with RVR at times as fast as 160 bpm,    Hyperlipidemia     Hypertension     Macular degeneration     PONV (postoperative nausea and vomiting)     Stress fracture of lumbar vertebra     from coughing; dx last week     Past Surgical History:  Past Surgical History:   Procedure Laterality Date    BACK SURGERY  09/2013    cement fusion    BLADDER SUSPENSION      BREAST LUMPECTOMY Right 09/16/2015    Dr Brandy Allan, Lumpectomy   [address]      x1    EYE SURGERY      OTHER SURGICAL HISTORY Right 10/21/15    I&D Rt. breast abcess    STOMACH SURGERY  07/2013    In [facility] gastric ulcer-perforation-repair    TUNNELED VENOUS PORT serious complication of liver or kidney damage. Although rare, I told her that this could be serious and therefore told her to stop the medication immediately and call if she developed any severe muscle aches or pains and she agreed to do so. · Follow-up repeat lipid panel. Essential Hypertension: Controlled  · ACE Inibitor/ARB: Continue losartan (Cozaar) 100 mg once daily I discussed the potential side effects of this medication including lightheadedness and dizziness and told her to call the office if this occurs. · Calcium Channel Blocker: Continue cahksownl96 mg daily I discussed the potential side effects of this medication including lightheadedness and dizziness and told her to call the office if this occurs. · Moderate Mitral Valve Stenosis: probably degenerative in etiology. · We briefly went over her echo results. · Currently compensated, continue follow-up. FOLLOW UP:  I told Ms. Govea to call my office if she had any problems, but otherwise told her to Return in about 6 months (around 6/11/2019) for Follow up. However, I would be happy to see her sooner should the need arise. Sincerely,  Raz Sanchez MD, F.A.C.C. [facility] Cardiology Specialist    [address]  Phone: [phone], Fax: [phone]     I believe that the risk of significant morbidity and mortality related to the patient's current medical conditions are: Intermediate. The documentation recorded by the scribe, accurately and completely reflects the services I personally performed and the decisions made by me. Jackey Peabody, MD, F.A.C.C.  December 11, 2018

## 2018-12-18 NOTE — TELEPHONE ENCOUNTER
Patient's daughter called to say that patient was having increased difficulty with swallowing liquids, unable to hear voice, sore throat. She has been getting progressively worse over last 4 days. She has had problems with sore throat over last several months and been on antibiotics and steroids at times with some improvement but has become worse. Daughter says she not eating much and is fatigued. Saw PCP at one point and nothing was found on exam.  Daughter is concerned about recurrence of cancer. 12/18/18  Discussed with Dr Steven Yung and will refer to ENT for evaluation. Called and spoke with Dr Lluvia Greenfield office and they will see patient in AM.  Information faxed. Called and spoke with daughter and she will take patient to appointment.   Reminded to take to ER if has any change in status

## 2019-01-01 ENCOUNTER — TELEPHONE (OUTPATIENT)
Dept: ONCOLOGY | Age: 84
End: 2019-01-01

## 2019-01-01 ENCOUNTER — OFFICE VISIT (OUTPATIENT)
Dept: CARDIOLOGY | Age: 84
End: 2019-01-01
Payer: MEDICARE

## 2019-01-01 ENCOUNTER — HOSPITAL ENCOUNTER (OUTPATIENT)
Dept: SPEECH THERAPY | Age: 84
Setting detail: THERAPIES SERIES
Discharge: HOME | End: 2019-03-22
Payer: MEDICARE

## 2019-01-01 ENCOUNTER — OFFICE VISIT (OUTPATIENT)
Dept: ONCOLOGY | Age: 84
End: 2019-01-01
Payer: MEDICARE

## 2019-01-01 ENCOUNTER — OFFICE VISIT (OUTPATIENT)
Dept: FAMILY MEDICINE CLINIC | Age: 84
End: 2019-01-01
Payer: MEDICARE

## 2019-01-01 ENCOUNTER — HOSPITAL ENCOUNTER (OUTPATIENT)
Dept: SPEECH THERAPY | Age: 84
Setting detail: THERAPIES SERIES
Discharge: HOME | End: 2019-05-07
Payer: MEDICARE

## 2019-01-01 ENCOUNTER — HOSPITAL ENCOUNTER (OUTPATIENT)
Dept: LAB | Age: 84
Discharge: HOME | End: 2019-01-15
Payer: MEDICARE

## 2019-01-01 ENCOUNTER — HOSPITAL ENCOUNTER (OUTPATIENT)
Dept: SPEECH THERAPY | Age: 84
Setting detail: THERAPIES SERIES
Discharge: HOME | End: 2019-04-12
Payer: MEDICARE

## 2019-01-01 ENCOUNTER — HOSPITAL ENCOUNTER (OUTPATIENT)
Dept: LAB | Age: 84
Discharge: HOME | End: 2019-06-17
Payer: MEDICARE

## 2019-01-01 ENCOUNTER — HOSPITAL ENCOUNTER (OUTPATIENT)
Dept: SPEECH THERAPY | Age: 84
Setting detail: THERAPIES SERIES
Discharge: HOME | End: 2019-06-06
Payer: MEDICARE

## 2019-01-01 ENCOUNTER — TELEPHONE (OUTPATIENT)
Dept: PHARMACY | Facility: CLINIC | Age: 84
End: 2019-01-01

## 2019-01-01 ENCOUNTER — TELEPHONE (OUTPATIENT)
Dept: CARDIOLOGY | Age: 84
End: 2019-01-01

## 2019-01-01 ENCOUNTER — APPOINTMENT (OUTPATIENT)
Dept: SPEECH THERAPY | Age: 84
End: 2019-01-01
Payer: MEDICARE

## 2019-01-01 ENCOUNTER — CARE COORDINATION (OUTPATIENT)
Dept: CASE MANAGEMENT | Age: 84
End: 2019-01-01

## 2019-01-01 ENCOUNTER — HOSPITAL ENCOUNTER (OUTPATIENT)
Dept: SPEECH THERAPY | Age: 84
Setting detail: THERAPIES SERIES
Discharge: HOME | End: 2019-05-15
Payer: MEDICARE

## 2019-01-01 ENCOUNTER — HOSPITAL ENCOUNTER (OUTPATIENT)
Dept: SPEECH THERAPY | Age: 84
Setting detail: THERAPIES SERIES
Discharge: HOME | End: 2019-03-15
Payer: MEDICARE

## 2019-01-01 ENCOUNTER — HOSPITAL ENCOUNTER (OUTPATIENT)
Dept: PET IMAGING | Age: 84
Discharge: HOME | End: 2019-05-03
Payer: MEDICARE

## 2019-01-01 ENCOUNTER — HOSPITAL ENCOUNTER (OUTPATIENT)
Dept: NON INVASIVE DIAGNOSTICS | Age: 84
Discharge: HOME | End: 2019-06-17
Payer: MEDICARE

## 2019-01-01 ENCOUNTER — HOSPITAL ENCOUNTER (OUTPATIENT)
Dept: INFUSION THERAPY | Age: 84
Discharge: HOME | End: 2019-06-05
Payer: MEDICARE

## 2019-01-01 ENCOUNTER — HOSPITAL ENCOUNTER (OUTPATIENT)
Dept: SPEECH THERAPY | Age: 84
Setting detail: THERAPIES SERIES
Discharge: HOME | End: 2019-03-29
Payer: MEDICARE

## 2019-01-01 ENCOUNTER — HOSPITAL ENCOUNTER (OUTPATIENT)
Dept: SPEECH THERAPY | Age: 84
Setting detail: THERAPIES SERIES
Discharge: HOME | End: 2019-05-24
Payer: MEDICARE

## 2019-01-01 ENCOUNTER — HOSPITAL ENCOUNTER (OUTPATIENT)
Dept: SPEECH THERAPY | Age: 84
Setting detail: THERAPIES SERIES
Discharge: HOME | End: 2019-07-18
Payer: MEDICARE

## 2019-01-01 ENCOUNTER — APPOINTMENT (OUTPATIENT)
Dept: GENERAL RADIOLOGY | Age: 84
DRG: 683 | End: 2019-01-01
Payer: MEDICARE

## 2019-01-01 ENCOUNTER — APPOINTMENT (OUTPATIENT)
Dept: GENERAL RADIOLOGY | Age: 84
End: 2019-01-01
Payer: MEDICARE

## 2019-01-01 ENCOUNTER — HOSPITAL ENCOUNTER (INPATIENT)
Age: 84
LOS: 3 days | Discharge: HOME | DRG: 291 | End: 2019-07-22
Attending: EMERGENCY MEDICINE | Admitting: INTERNAL MEDICINE
Payer: MEDICARE

## 2019-01-01 ENCOUNTER — HOSPITAL ENCOUNTER (OUTPATIENT)
Dept: GENERAL RADIOLOGY | Age: 84
Discharge: HOME | End: 2019-04-27
Payer: MEDICARE

## 2019-01-01 ENCOUNTER — APPOINTMENT (OUTPATIENT)
Dept: GENERAL RADIOLOGY | Age: 84
DRG: 191 | End: 2019-01-01
Payer: MEDICARE

## 2019-01-01 ENCOUNTER — HOSPITAL ENCOUNTER (OUTPATIENT)
Dept: SPEECH THERAPY | Age: 84
Setting detail: THERAPIES SERIES
Discharge: HOME | End: 2019-05-03
Payer: MEDICARE

## 2019-01-01 ENCOUNTER — HOSPITAL ENCOUNTER (OUTPATIENT)
Dept: SPEECH THERAPY | Age: 84
Setting detail: THERAPIES SERIES
Discharge: HOME | End: 2019-04-16
Payer: MEDICARE

## 2019-01-01 ENCOUNTER — HOSPITAL ENCOUNTER (EMERGENCY)
Age: 84
Discharge: ANOTHER HOSPITAL | End: 2019-07-23
Payer: MEDICARE

## 2019-01-01 ENCOUNTER — HOSPITAL ENCOUNTER (OUTPATIENT)
Dept: SPEECH THERAPY | Age: 84
Setting detail: THERAPIES SERIES
Discharge: HOME | End: 2019-03-05
Payer: MEDICARE

## 2019-01-01 ENCOUNTER — HOSPITAL ENCOUNTER (OUTPATIENT)
Dept: SPEECH THERAPY | Age: 84
Setting detail: THERAPIES SERIES
Discharge: HOME | End: 2019-02-26
Payer: MEDICARE

## 2019-01-01 ENCOUNTER — HOSPITAL ENCOUNTER (OUTPATIENT)
Dept: SPEECH THERAPY | Age: 84
Setting detail: THERAPIES SERIES
Discharge: HOME | End: 2019-05-30
Payer: MEDICARE

## 2019-01-01 ENCOUNTER — HOSPITAL ENCOUNTER (OUTPATIENT)
Dept: GENERAL RADIOLOGY | Age: 84
Discharge: HOME | End: 2019-02-06
Payer: MEDICARE

## 2019-01-01 ENCOUNTER — HOSPITAL ENCOUNTER (OUTPATIENT)
Dept: SPEECH THERAPY | Age: 84
Setting detail: THERAPIES SERIES
Discharge: HOME | End: 2019-06-27
Payer: MEDICARE

## 2019-01-01 ENCOUNTER — HOSPITAL ENCOUNTER (EMERGENCY)
Age: 84
Discharge: HOME | DRG: 683 | End: 2019-07-13
Attending: EMERGENCY MEDICINE
Payer: MEDICARE

## 2019-01-01 ENCOUNTER — HOSPITAL ENCOUNTER (OUTPATIENT)
Dept: CT IMAGING | Age: 84
Discharge: HOME | End: 2019-01-17
Payer: MEDICARE

## 2019-01-01 ENCOUNTER — HOSPITAL ENCOUNTER (OUTPATIENT)
Dept: SPEECH THERAPY | Age: 84
Setting detail: THERAPIES SERIES
Discharge: HOME | End: 2019-07-11
Payer: MEDICARE

## 2019-01-01 ENCOUNTER — HOSPITAL ENCOUNTER (OUTPATIENT)
Dept: WOMENS IMAGING | Age: 84
Discharge: HOME | End: 2019-04-18
Payer: MEDICARE

## 2019-01-01 ENCOUNTER — HOSPITAL ENCOUNTER (OUTPATIENT)
Dept: NON INVASIVE DIAGNOSTICS | Age: 84
Discharge: HOME | End: 2019-01-25
Payer: MEDICARE

## 2019-01-01 ENCOUNTER — TELEPHONE (OUTPATIENT)
Dept: FAMILY MEDICINE CLINIC | Age: 84
End: 2019-01-01

## 2019-01-01 ENCOUNTER — HOSPITAL ENCOUNTER (OUTPATIENT)
Dept: SPEECH THERAPY | Age: 84
Setting detail: THERAPIES SERIES
Discharge: HOME | End: 2019-01-29
Payer: MEDICARE

## 2019-01-01 ENCOUNTER — HOSPITAL ENCOUNTER (OUTPATIENT)
Dept: GENERAL RADIOLOGY | Age: 84
Discharge: HOME | End: 2019-06-26
Payer: MEDICARE

## 2019-01-01 ENCOUNTER — HOSPITAL ENCOUNTER (OUTPATIENT)
Dept: SPEECH THERAPY | Age: 84
Setting detail: THERAPIES SERIES
Discharge: HOME | End: 2019-02-13
Payer: MEDICARE

## 2019-01-01 ENCOUNTER — HOSPITAL ENCOUNTER (OUTPATIENT)
Dept: SPEECH THERAPY | Age: 84
Setting detail: THERAPIES SERIES
Discharge: HOME | End: 2019-04-05
Payer: MEDICARE

## 2019-01-01 ENCOUNTER — HOSPITAL ENCOUNTER (OUTPATIENT)
Dept: SPEECH THERAPY | Age: 84
Setting detail: THERAPIES SERIES
Discharge: HOME | End: 2019-02-04
Payer: MEDICARE

## 2019-01-01 ENCOUNTER — HOSPITAL ENCOUNTER (OUTPATIENT)
Dept: SPEECH THERAPY | Age: 84
Setting detail: THERAPIES SERIES
Discharge: HOME | End: 2019-06-13
Payer: MEDICARE

## 2019-01-01 ENCOUNTER — HOSPITAL ENCOUNTER (OUTPATIENT)
Dept: SPEECH THERAPY | Age: 84
Setting detail: THERAPIES SERIES
Discharge: HOME | End: 2019-02-19
Payer: MEDICARE

## 2019-01-01 ENCOUNTER — APPOINTMENT (OUTPATIENT)
Dept: GENERAL RADIOLOGY | Age: 84
DRG: 291 | End: 2019-01-01
Payer: MEDICARE

## 2019-01-01 ENCOUNTER — HOSPITAL ENCOUNTER (OUTPATIENT)
Age: 84
Discharge: HOME | End: 2019-04-27
Payer: MEDICARE

## 2019-01-01 ENCOUNTER — TELEPHONE (OUTPATIENT)
Dept: OTHER | Facility: CLINIC | Age: 84
End: 2019-01-01

## 2019-01-01 ENCOUNTER — HOSPITAL ENCOUNTER (INPATIENT)
Age: 84
LOS: 2 days | Discharge: HOME | DRG: 683 | End: 2019-07-17
Attending: INTERNAL MEDICINE | Admitting: INTERNAL MEDICINE
Payer: MEDICARE

## 2019-01-01 ENCOUNTER — HOSPITAL ENCOUNTER (INPATIENT)
Age: 84
LOS: 3 days | Discharge: HOME | DRG: 191 | End: 2019-01-10
Attending: EMERGENCY MEDICINE | Admitting: INTERNAL MEDICINE
Payer: MEDICARE

## 2019-01-01 VITALS
SYSTOLIC BLOOD PRESSURE: 130 MMHG | TEMPERATURE: 97.3 F | HEART RATE: 117 BPM | WEIGHT: 157.2 LBS | RESPIRATION RATE: 18 BRPM | BODY MASS INDEX: 28.93 KG/M2 | HEIGHT: 62 IN | DIASTOLIC BLOOD PRESSURE: 81 MMHG

## 2019-01-01 VITALS
DIASTOLIC BLOOD PRESSURE: 81 MMHG | SYSTOLIC BLOOD PRESSURE: 127 MMHG | HEIGHT: 60 IN | OXYGEN SATURATION: 95 % | BODY MASS INDEX: 29.45 KG/M2 | RESPIRATION RATE: 22 BRPM | WEIGHT: 150 LBS | HEART RATE: 80 BPM

## 2019-01-01 VITALS
BODY MASS INDEX: 32.22 KG/M2 | TEMPERATURE: 97.9 F | HEIGHT: 62 IN | SYSTOLIC BLOOD PRESSURE: 144 MMHG | OXYGEN SATURATION: 96 % | WEIGHT: 175.1 LBS | RESPIRATION RATE: 18 BRPM | DIASTOLIC BLOOD PRESSURE: 85 MMHG | HEART RATE: 93 BPM

## 2019-01-01 VITALS
HEART RATE: 99 BPM | RESPIRATION RATE: 18 BRPM | HEIGHT: 60 IN | WEIGHT: 156.5 LBS | BODY MASS INDEX: 30.73 KG/M2 | TEMPERATURE: 97.9 F | DIASTOLIC BLOOD PRESSURE: 59 MMHG | OXYGEN SATURATION: 95 % | SYSTOLIC BLOOD PRESSURE: 107 MMHG

## 2019-01-01 VITALS
HEIGHT: 60 IN | RESPIRATION RATE: 22 BRPM | WEIGHT: 150 LBS | HEART RATE: 90 BPM | SYSTOLIC BLOOD PRESSURE: 110 MMHG | DIASTOLIC BLOOD PRESSURE: 42 MMHG | BODY MASS INDEX: 29.45 KG/M2 | TEMPERATURE: 98.8 F | OXYGEN SATURATION: 98 %

## 2019-01-01 VITALS
TEMPERATURE: 99.1 F | HEART RATE: 111 BPM | SYSTOLIC BLOOD PRESSURE: 100 MMHG | DIASTOLIC BLOOD PRESSURE: 73 MMHG | HEIGHT: 60 IN | WEIGHT: 148.8 LBS | BODY MASS INDEX: 29.21 KG/M2 | RESPIRATION RATE: 20 BRPM

## 2019-01-01 VITALS
BODY MASS INDEX: 31.33 KG/M2 | HEIGHT: 60 IN | OXYGEN SATURATION: 94 % | SYSTOLIC BLOOD PRESSURE: 133 MMHG | HEART RATE: 90 BPM | TEMPERATURE: 98.7 F | RESPIRATION RATE: 16 BRPM | WEIGHT: 159.6 LBS | DIASTOLIC BLOOD PRESSURE: 67 MMHG

## 2019-01-01 VITALS
SYSTOLIC BLOOD PRESSURE: 135 MMHG | DIASTOLIC BLOOD PRESSURE: 83 MMHG | OXYGEN SATURATION: 97 % | HEIGHT: 62 IN | HEART RATE: 95 BPM | BODY MASS INDEX: 28.85 KG/M2 | RESPIRATION RATE: 20 BRPM | WEIGHT: 156.8 LBS

## 2019-01-01 VITALS
BODY MASS INDEX: 30.07 KG/M2 | SYSTOLIC BLOOD PRESSURE: 131 MMHG | DIASTOLIC BLOOD PRESSURE: 82 MMHG | RESPIRATION RATE: 18 BRPM | WEIGHT: 163.4 LBS | HEART RATE: 126 BPM | HEIGHT: 62 IN | TEMPERATURE: 97.2 F

## 2019-01-01 VITALS
BODY MASS INDEX: 29.19 KG/M2 | WEIGHT: 158.6 LBS | SYSTOLIC BLOOD PRESSURE: 148 MMHG | HEART RATE: 109 BPM | RESPIRATION RATE: 20 BRPM | HEIGHT: 62 IN | TEMPERATURE: 99 F | DIASTOLIC BLOOD PRESSURE: 70 MMHG

## 2019-01-01 VITALS
HEART RATE: 103 BPM | BODY MASS INDEX: 30.74 KG/M2 | TEMPERATURE: 99.2 F | HEIGHT: 60 IN | WEIGHT: 156.6 LBS | SYSTOLIC BLOOD PRESSURE: 127 MMHG | DIASTOLIC BLOOD PRESSURE: 85 MMHG | RESPIRATION RATE: 18 BRPM

## 2019-01-01 VITALS
BODY MASS INDEX: 28.9 KG/M2 | OXYGEN SATURATION: 98 % | SYSTOLIC BLOOD PRESSURE: 150 MMHG | WEIGHT: 158 LBS | DIASTOLIC BLOOD PRESSURE: 82 MMHG | HEART RATE: 106 BPM

## 2019-01-01 VITALS
RESPIRATION RATE: 22 BRPM | SYSTOLIC BLOOD PRESSURE: 137 MMHG | BODY MASS INDEX: 30.36 KG/M2 | HEART RATE: 109 BPM | HEIGHT: 62 IN | DIASTOLIC BLOOD PRESSURE: 85 MMHG | WEIGHT: 165 LBS | OXYGEN SATURATION: 97 %

## 2019-01-01 VITALS
SYSTOLIC BLOOD PRESSURE: 103 MMHG | WEIGHT: 148 LBS | RESPIRATION RATE: 20 BRPM | OXYGEN SATURATION: 99 % | HEIGHT: 60 IN | BODY MASS INDEX: 29.06 KG/M2 | DIASTOLIC BLOOD PRESSURE: 55 MMHG | HEART RATE: 86 BPM

## 2019-01-01 VITALS
DIASTOLIC BLOOD PRESSURE: 81 MMHG | HEART RATE: 88 BPM | BODY MASS INDEX: 30.73 KG/M2 | TEMPERATURE: 97 F | SYSTOLIC BLOOD PRESSURE: 125 MMHG | WEIGHT: 168 LBS | RESPIRATION RATE: 16 BRPM

## 2019-01-01 DIAGNOSIS — J40 BRONCHITIS: ICD-10-CM

## 2019-01-01 DIAGNOSIS — R06.02 SOB (SHORTNESS OF BREATH): ICD-10-CM

## 2019-01-01 DIAGNOSIS — Z17.1 MALIGNANT NEOPLASM OF UPPER-OUTER QUADRANT OF RIGHT BREAST IN FEMALE, ESTROGEN RECEPTOR NEGATIVE (HCC): ICD-10-CM

## 2019-01-01 DIAGNOSIS — Z12.31 SCREENING MAMMOGRAM, ENCOUNTER FOR: ICD-10-CM

## 2019-01-01 DIAGNOSIS — Z17.1 MALIGNANT NEOPLASM OF UPPER-OUTER QUADRANT OF RIGHT BREAST IN FEMALE, ESTROGEN RECEPTOR NEGATIVE (HCC): Primary | ICD-10-CM

## 2019-01-01 DIAGNOSIS — C50.411 MALIGNANT NEOPLASM OF UPPER-OUTER QUADRANT OF RIGHT BREAST IN FEMALE, ESTROGEN RECEPTOR NEGATIVE (HCC): ICD-10-CM

## 2019-01-01 DIAGNOSIS — N18.2 ACUTE RENAL FAILURE WITH ACUTE TUBULAR NECROSIS SUPERIMPOSED ON STAGE 2 CHRONIC KIDNEY DISEASE (HCC): ICD-10-CM

## 2019-01-01 DIAGNOSIS — I48.0 PAROXYSMAL ATRIAL FIBRILLATION (HCC): ICD-10-CM

## 2019-01-01 DIAGNOSIS — R13.12 OROPHARYNGEAL DYSPHAGIA: ICD-10-CM

## 2019-01-01 DIAGNOSIS — J90 BILATERAL PLEURAL EFFUSION: Primary | ICD-10-CM

## 2019-01-01 DIAGNOSIS — J38.01 VOCAL CORD PARALYSIS, UNILATERAL COMPLETE: ICD-10-CM

## 2019-01-01 DIAGNOSIS — E87.6 HYPOKALEMIA: ICD-10-CM

## 2019-01-01 DIAGNOSIS — C50.411 MALIGNANT NEOPLASM OF UPPER-OUTER QUADRANT OF RIGHT BREAST IN FEMALE, ESTROGEN RECEPTOR NEGATIVE (HCC): Primary | ICD-10-CM

## 2019-01-01 DIAGNOSIS — R91.1 LUNG NODULE: ICD-10-CM

## 2019-01-01 DIAGNOSIS — E78.2 MIXED HYPERLIPIDEMIA: ICD-10-CM

## 2019-01-01 DIAGNOSIS — J90 BILATERAL PLEURAL EFFUSION: ICD-10-CM

## 2019-01-01 DIAGNOSIS — R13.19 OTHER DYSPHAGIA: ICD-10-CM

## 2019-01-01 DIAGNOSIS — R00.0 SINUS TACHYCARDIA: ICD-10-CM

## 2019-01-01 DIAGNOSIS — R53.83 FATIGUE, UNSPECIFIED TYPE: ICD-10-CM

## 2019-01-01 DIAGNOSIS — E78.00 PURE HYPERCHOLESTEROLEMIA: ICD-10-CM

## 2019-01-01 DIAGNOSIS — I05.9 MITRAL VALVE DISEASE: ICD-10-CM

## 2019-01-01 DIAGNOSIS — R06.02 SHORTNESS OF BREATH: ICD-10-CM

## 2019-01-01 DIAGNOSIS — R06.02 SHORTNESS OF BREATH: Primary | ICD-10-CM

## 2019-01-01 DIAGNOSIS — I10 ESSENTIAL HYPERTENSION: ICD-10-CM

## 2019-01-01 DIAGNOSIS — I38 VALVULAR HEART DISEASE: ICD-10-CM

## 2019-01-01 DIAGNOSIS — J44.1 COPD EXACERBATION (HCC): ICD-10-CM

## 2019-01-01 DIAGNOSIS — K21.9 GASTROESOPHAGEAL REFLUX DISEASE WITHOUT ESOPHAGITIS: ICD-10-CM

## 2019-01-01 DIAGNOSIS — I08.0 MITRAL AND AORTIC VALVE DISEASE: ICD-10-CM

## 2019-01-01 DIAGNOSIS — R00.0 SINUS TACHYCARDIA: Primary | ICD-10-CM

## 2019-01-01 DIAGNOSIS — Z79.01 CHRONIC ANTICOAGULATION: ICD-10-CM

## 2019-01-01 DIAGNOSIS — J38.01 VOCAL CORD PARALYSIS, UNILATERAL COMPLETE: Primary | ICD-10-CM

## 2019-01-01 DIAGNOSIS — Z12.31 SCREENING MAMMOGRAM, ENCOUNTER FOR: Primary | ICD-10-CM

## 2019-01-01 DIAGNOSIS — E83.42 HYPOMAGNESEMIA: ICD-10-CM

## 2019-01-01 DIAGNOSIS — R06.02 SOB (SHORTNESS OF BREATH): Primary | ICD-10-CM

## 2019-01-01 DIAGNOSIS — I50.33 ACUTE ON CHRONIC DIASTOLIC CHF (CONGESTIVE HEART FAILURE), NYHA CLASS 3 (HCC): Primary | ICD-10-CM

## 2019-01-01 DIAGNOSIS — D63.0 ANEMIA IN NEOPLASTIC DISEASE: ICD-10-CM

## 2019-01-01 DIAGNOSIS — N17.0 ACUTE RENAL FAILURE WITH ACUTE TUBULAR NECROSIS SUPERIMPOSED ON STAGE 2 CHRONIC KIDNEY DISEASE (HCC): ICD-10-CM

## 2019-01-01 DIAGNOSIS — J18.9 HEALTHCARE-ASSOCIATED PNEUMONIA: Primary | ICD-10-CM

## 2019-01-01 DIAGNOSIS — I48.0 PAROXYSMAL ATRIAL FIBRILLATION (HCC): Primary | ICD-10-CM

## 2019-01-01 DIAGNOSIS — R06.03 RESPIRATORY DISTRESS: ICD-10-CM

## 2019-01-01 DIAGNOSIS — R06.89 DYSPNEA AND RESPIRATORY ABNORMALITIES: ICD-10-CM

## 2019-01-01 DIAGNOSIS — R13.10 DYSPHAGIA, UNSPECIFIED TYPE: ICD-10-CM

## 2019-01-01 DIAGNOSIS — R05.9 COUGH: ICD-10-CM

## 2019-01-01 DIAGNOSIS — E11.9 TYPE 2 DIABETES MELLITUS WITHOUT COMPLICATION, WITHOUT LONG-TERM CURRENT USE OF INSULIN (HCC): Primary | ICD-10-CM

## 2019-01-01 DIAGNOSIS — R49.0 DYSPHONIA: ICD-10-CM

## 2019-01-01 DIAGNOSIS — R53.81 DEBILITY: ICD-10-CM

## 2019-01-01 DIAGNOSIS — R13.13 DYSPHAGIA, PHARYNGEAL: Primary | ICD-10-CM

## 2019-01-01 DIAGNOSIS — E87.70 HYPERVOLEMIA, UNSPECIFIED HYPERVOLEMIA TYPE: Primary | ICD-10-CM

## 2019-01-01 DIAGNOSIS — R06.00 DYSPNEA AND RESPIRATORY ABNORMALITIES: ICD-10-CM

## 2019-01-01 DIAGNOSIS — I50.33 ACUTE ON CHRONIC DIASTOLIC (CONGESTIVE) HEART FAILURE (HCC): Primary | ICD-10-CM

## 2019-01-01 DIAGNOSIS — E78.5 DYSLIPIDEMIA: ICD-10-CM

## 2019-01-01 DIAGNOSIS — I50.30 DIASTOLIC CONGESTIVE HEART FAILURE, UNSPECIFIED HF CHRONICITY (HCC): ICD-10-CM

## 2019-01-01 DIAGNOSIS — N28.9 ACUTE RENAL INSUFFICIENCY: Primary | ICD-10-CM

## 2019-01-01 DIAGNOSIS — I34.2 NON-RHEUMATIC MITRAL VALVE STENOSIS: ICD-10-CM

## 2019-01-01 DIAGNOSIS — R94.31 ABNORMAL ECG: ICD-10-CM

## 2019-01-01 DIAGNOSIS — J44.1 COPD EXACERBATION (HCC): Primary | ICD-10-CM

## 2019-01-01 DIAGNOSIS — R09.02 HYPOXIA: ICD-10-CM

## 2019-01-01 DIAGNOSIS — R53.1 GENERAL WEAKNESS: Primary | ICD-10-CM

## 2019-01-01 LAB
-: ABNORMAL
ABSOLUTE BANDS #: 0.66 K/UL (ref 0–1)
ABSOLUTE EOS #: 0 K/UL (ref 0–0.44)
ABSOLUTE EOS #: 0.04 K/UL (ref 0–0.44)
ABSOLUTE EOS #: 0.05 K/UL (ref 0–0.44)
ABSOLUTE EOS #: 0.09 K/UL (ref 0–0.44)
ABSOLUTE EOS #: 0.09 K/UL (ref 0–0.44)
ABSOLUTE EOS #: 0.1 K/UL (ref 0–0.44)
ABSOLUTE EOS #: 0.13 K/UL (ref 0–0.44)
ABSOLUTE EOS #: 0.14 K/UL (ref 0–0.44)
ABSOLUTE IMMATURE GRANULOCYTE: 0 K/UL (ref 0–0.3)
ABSOLUTE IMMATURE GRANULOCYTE: 0.03 K/UL (ref 0–0.3)
ABSOLUTE IMMATURE GRANULOCYTE: 0.04 K/UL (ref 0–0.3)
ABSOLUTE IMMATURE GRANULOCYTE: 0.04 K/UL (ref 0–0.3)
ABSOLUTE IMMATURE GRANULOCYTE: 0.1 K/UL (ref 0–0.3)
ABSOLUTE IMMATURE GRANULOCYTE: 0.13 K/UL (ref 0–0.3)
ABSOLUTE IMMATURE GRANULOCYTE: 0.14 K/UL (ref 0–0.3)
ABSOLUTE IMMATURE GRANULOCYTE: 0.25 K/UL (ref 0–0.3)
ABSOLUTE IMMATURE GRANULOCYTE: 0.29 K/UL (ref 0–0.3)
ABSOLUTE IMMATURE GRANULOCYTE: 0.33 K/UL (ref 0–0.3)
ABSOLUTE LYMPH #: 0.21 K/UL (ref 1.1–3.7)
ABSOLUTE LYMPH #: 0.32 K/UL (ref 1.1–3.7)
ABSOLUTE LYMPH #: 0.54 K/UL (ref 1.1–3.7)
ABSOLUTE LYMPH #: 0.7 K/UL (ref 1.1–3.7)
ABSOLUTE LYMPH #: 0.72 K/UL (ref 1.1–3.7)
ABSOLUTE LYMPH #: 0.86 K/UL (ref 1.1–3.7)
ABSOLUTE LYMPH #: 0.93 K/UL (ref 1.1–3.7)
ABSOLUTE LYMPH #: 0.96 K/UL (ref 1.1–3.7)
ABSOLUTE LYMPH #: 0.98 K/UL (ref 1.1–3.7)
ABSOLUTE LYMPH #: 0.99 K/UL (ref 1.1–3.7)
ABSOLUTE LYMPH #: 1.01 K/UL (ref 1.1–3.7)
ABSOLUTE LYMPH #: 1.38 K/UL (ref 1.1–3.7)
ABSOLUTE MONO #: 0 K/UL (ref 0.1–1.2)
ABSOLUTE MONO #: 0.05 K/UL (ref 0.1–1.2)
ABSOLUTE MONO #: 0.38 K/UL (ref 0.1–1.2)
ABSOLUTE MONO #: 0.46 K/UL (ref 0.1–1.2)
ABSOLUTE MONO #: 0.57 K/UL (ref 0.1–1.2)
ABSOLUTE MONO #: 0.62 K/UL (ref 0.1–1.2)
ABSOLUTE MONO #: 0.64 K/UL (ref 0.1–1.2)
ABSOLUTE MONO #: 0.64 K/UL (ref 0.1–1.2)
ABSOLUTE MONO #: 0.69 K/UL (ref 0.1–1.2)
ABSOLUTE MONO #: 0.69 K/UL (ref 0.1–1.2)
ABSOLUTE MONO #: 0.81 K/UL (ref 0.1–1.2)
ABSOLUTE MONO #: 0.96 K/UL (ref 0.1–1.2)
ALBUMIN SERPL-MCNC: 2.4 G/DL (ref 3.5–5.2)
ALBUMIN SERPL-MCNC: 2.5 G/DL (ref 3.5–5.2)
ALBUMIN SERPL-MCNC: 2.7 G/DL (ref 3.5–5.2)
ALBUMIN SERPL-MCNC: 2.8 G/DL (ref 3.5–5.2)
ALBUMIN SERPL-MCNC: 2.9 G/DL (ref 3.5–5.2)
ALBUMIN SERPL-MCNC: 2.9 G/DL (ref 3.5–5.2)
ALBUMIN SERPL-MCNC: 3.2 G/DL (ref 3.5–5.2)
ALBUMIN SERPL-MCNC: 3.2 G/DL (ref 3.5–5.2)
ALBUMIN SERPL-MCNC: 3.4 G/DL (ref 3.5–5.2)
ALBUMIN SERPL-MCNC: 3.8 G/DL (ref 3.5–5.2)
ALBUMIN SERPL-MCNC: 4.1 G/DL (ref 3.5–5.2)
ALBUMIN/GLOBULIN RATIO: 1.1 (ref 1–2.5)
ALBUMIN/GLOBULIN RATIO: 1.2 (ref 1–2.5)
ALBUMIN/GLOBULIN RATIO: 1.3 (ref 1–2.5)
ALBUMIN/GLOBULIN RATIO: 1.3 (ref 1–2.5)
ALBUMIN/GLOBULIN RATIO: 1.4 (ref 1–2.5)
ALBUMIN/GLOBULIN RATIO: 1.5 (ref 1–2.5)
ALBUMIN/GLOBULIN RATIO: 1.5 (ref 1–2.5)
ALBUMIN/GLOBULIN RATIO: 1.7 (ref 1–2.5)
ALBUMIN/GLOBULIN RATIO: 1.7 (ref 1–2.5)
ALLEN TEST: ABNORMAL
ALP BLD-CCNC: 63 U/L (ref 35–104)
ALP BLD-CCNC: 63 U/L (ref 35–104)
ALP BLD-CCNC: 65 U/L (ref 35–104)
ALP BLD-CCNC: 67 U/L (ref 35–104)
ALP BLD-CCNC: 69 U/L (ref 35–104)
ALP BLD-CCNC: 70 U/L (ref 35–104)
ALP BLD-CCNC: 70 U/L (ref 35–104)
ALP BLD-CCNC: 72 U/L (ref 35–104)
ALP BLD-CCNC: 73 U/L (ref 35–104)
ALP BLD-CCNC: 77 U/L (ref 35–104)
ALP BLD-CCNC: 86 U/L (ref 35–104)
ALT SERPL-CCNC: 11 U/L (ref 5–33)
ALT SERPL-CCNC: 12 U/L (ref 5–33)
ALT SERPL-CCNC: 13 U/L (ref 5–33)
ALT SERPL-CCNC: 14 U/L (ref 5–33)
ALT SERPL-CCNC: 17 U/L (ref 5–33)
ALT SERPL-CCNC: 9 U/L (ref 5–33)
AMORPHOUS: ABNORMAL
ANION GAP SERPL CALCULATED.3IONS-SCNC: 11 MMOL/L (ref 9–17)
ANION GAP SERPL CALCULATED.3IONS-SCNC: 11 MMOL/L (ref 9–17)
ANION GAP SERPL CALCULATED.3IONS-SCNC: 12 MMOL/L (ref 9–17)
ANION GAP SERPL CALCULATED.3IONS-SCNC: 13 MMOL/L (ref 9–17)
ANION GAP SERPL CALCULATED.3IONS-SCNC: 14 MMOL/L (ref 9–17)
ANION GAP SERPL CALCULATED.3IONS-SCNC: 15 MMOL/L (ref 9–17)
ANION GAP SERPL CALCULATED.3IONS-SCNC: 15 MMOL/L (ref 9–17)
ANION GAP SERPL CALCULATED.3IONS-SCNC: 16 MMOL/L (ref 9–17)
ANION GAP SERPL CALCULATED.3IONS-SCNC: 18 MMOL/L (ref 9–17)
ANION GAP SERPL CALCULATED.3IONS-SCNC: 19 MMOL/L (ref 9–17)
ANION GAP SERPL CALCULATED.3IONS-SCNC: 20 MMOL/L (ref 9–17)
AST SERPL-CCNC: 16 U/L
AST SERPL-CCNC: 17 U/L
AST SERPL-CCNC: 18 U/L
AST SERPL-CCNC: 19 U/L
AST SERPL-CCNC: 20 U/L
AST SERPL-CCNC: 21 U/L
AST SERPL-CCNC: 21 U/L
AST SERPL-CCNC: 25 U/L
AST SERPL-CCNC: 26 U/L
BACTERIA: ABNORMAL
BANDS: 15 % (ref 0–10)
BASOPHILS # BLD: 0 % (ref 0–2)
BASOPHILS # BLD: 1 % (ref 0–2)
BASOPHILS ABSOLUTE: 0 K/UL (ref 0–0.2)
BASOPHILS ABSOLUTE: 0.04 K/UL (ref 0–0.2)
BASOPHILS ABSOLUTE: 0.05 K/UL (ref 0–0.2)
BASOPHILS ABSOLUTE: <0.03 K/UL (ref 0–0.2)
BILIRUB SERPL-MCNC: 0.27 MG/DL (ref 0.3–1.2)
BILIRUB SERPL-MCNC: 0.32 MG/DL (ref 0.3–1.2)
BILIRUB SERPL-MCNC: 0.37 MG/DL (ref 0.3–1.2)
BILIRUB SERPL-MCNC: 0.38 MG/DL (ref 0.3–1.2)
BILIRUB SERPL-MCNC: 0.41 MG/DL (ref 0.3–1.2)
BILIRUB SERPL-MCNC: 0.46 MG/DL (ref 0.3–1.2)
BILIRUB SERPL-MCNC: 0.48 MG/DL (ref 0.3–1.2)
BILIRUB SERPL-MCNC: 0.54 MG/DL (ref 0.3–1.2)
BILIRUB SERPL-MCNC: 0.57 MG/DL (ref 0.3–1.2)
BILIRUB SERPL-MCNC: <0.1 MG/DL (ref 0.3–1.2)
BILIRUB SERPL-MCNC: <0.1 MG/DL (ref 0.3–1.2)
BILIRUBIN URINE: NEGATIVE
BNP INTERPRETATION: ABNORMAL
BUN BLDV-MCNC: 20 MG/DL (ref 8–23)
BUN BLDV-MCNC: 26 MG/DL (ref 8–23)
BUN BLDV-MCNC: 27 MG/DL (ref 8–23)
BUN BLDV-MCNC: 27 MG/DL (ref 8–23)
BUN BLDV-MCNC: 29 MG/DL (ref 8–23)
BUN BLDV-MCNC: 30 MG/DL (ref 8–23)
BUN BLDV-MCNC: 31 MG/DL (ref 8–23)
BUN BLDV-MCNC: 33 MG/DL (ref 8–23)
BUN BLDV-MCNC: 34 MG/DL (ref 8–23)
BUN BLDV-MCNC: 37 MG/DL (ref 8–23)
BUN BLDV-MCNC: 39 MG/DL (ref 8–23)
BUN BLDV-MCNC: 43 MG/DL (ref 8–23)
BUN BLDV-MCNC: 52 MG/DL (ref 8–23)
BUN/CREAT BLD: 13 (ref 9–20)
BUN/CREAT BLD: 16 (ref 9–20)
BUN/CREAT BLD: 17 (ref 9–20)
BUN/CREAT BLD: 17 (ref 9–20)
BUN/CREAT BLD: 18 (ref 9–20)
BUN/CREAT BLD: 19 (ref 9–20)
BUN/CREAT BLD: 20 (ref 9–20)
BUN/CREAT BLD: 21 (ref 9–20)
BUN/CREAT BLD: 23 (ref 9–20)
BUN/CREAT BLD: 24 (ref 9–20)
BUN/CREAT BLD: 26 (ref 9–20)
BUN/CREAT BLD: 26 (ref 9–20)
BUN/CREAT BLD: 27 (ref 9–20)
CA 27-29: 16 U/ML (ref 0–38)
CALCIUM SERPL-MCNC: 10.2 MG/DL (ref 8.6–10.4)
CALCIUM SERPL-MCNC: 7.2 MG/DL (ref 8.6–10.4)
CALCIUM SERPL-MCNC: 7.6 MG/DL (ref 8.6–10.4)
CALCIUM SERPL-MCNC: 8.1 MG/DL (ref 8.6–10.4)
CALCIUM SERPL-MCNC: 8.4 MG/DL (ref 8.6–10.4)
CALCIUM SERPL-MCNC: 8.4 MG/DL (ref 8.6–10.4)
CALCIUM SERPL-MCNC: 8.5 MG/DL (ref 8.6–10.4)
CALCIUM SERPL-MCNC: 8.5 MG/DL (ref 8.6–10.4)
CALCIUM SERPL-MCNC: 8.6 MG/DL (ref 8.6–10.4)
CALCIUM SERPL-MCNC: 8.7 MG/DL (ref 8.6–10.4)
CALCIUM SERPL-MCNC: 8.7 MG/DL (ref 8.6–10.4)
CALCIUM SERPL-MCNC: 8.9 MG/DL (ref 8.6–10.4)
CALCIUM SERPL-MCNC: 9.1 MG/DL (ref 8.6–10.4)
CALCIUM SERPL-MCNC: 9.4 MG/DL (ref 8.6–10.4)
CALCIUM SERPL-MCNC: 9.5 MG/DL (ref 8.6–10.4)
CARBOXYHEMOGLOBIN: ABNORMAL % (ref 0–5)
CASTS UA: ABNORMAL /LPF
CHLORIDE BLD-SCNC: 100 MMOL/L (ref 98–107)
CHLORIDE BLD-SCNC: 103 MMOL/L (ref 98–107)
CHLORIDE BLD-SCNC: 104 MMOL/L (ref 98–107)
CHLORIDE BLD-SCNC: 105 MMOL/L (ref 98–107)
CHLORIDE BLD-SCNC: 106 MMOL/L (ref 98–107)
CHLORIDE BLD-SCNC: 106 MMOL/L (ref 98–107)
CHLORIDE BLD-SCNC: 107 MMOL/L (ref 98–107)
CHLORIDE BLD-SCNC: 107 MMOL/L (ref 98–107)
CHLORIDE BLD-SCNC: 108 MMOL/L (ref 98–107)
CHLORIDE BLD-SCNC: 109 MMOL/L (ref 98–107)
CHLORIDE BLD-SCNC: 109 MMOL/L (ref 98–107)
CHLORIDE BLD-SCNC: 110 MMOL/L (ref 98–107)
CHLORIDE BLD-SCNC: 110 MMOL/L (ref 98–107)
CHLORIDE BLD-SCNC: 115 MMOL/L (ref 98–107)
CHLORIDE BLD-SCNC: 115 MMOL/L (ref 98–107)
CO2: 17 MMOL/L (ref 20–31)
CO2: 19 MMOL/L (ref 20–31)
CO2: 20 MMOL/L (ref 20–31)
CO2: 20 MMOL/L (ref 20–31)
CO2: 22 MMOL/L (ref 20–31)
CO2: 23 MMOL/L (ref 20–31)
CO2: 23 MMOL/L (ref 20–31)
CO2: 24 MMOL/L (ref 20–31)
COLOR: YELLOW
COMMENT UA: ABNORMAL
CREAT SERPL-MCNC: 1.25 MG/DL (ref 0.5–0.9)
CREAT SERPL-MCNC: 1.26 MG/DL (ref 0.5–0.9)
CREAT SERPL-MCNC: 1.37 MG/DL (ref 0.5–0.9)
CREAT SERPL-MCNC: 1.42 MG/DL (ref 0.5–0.9)
CREAT SERPL-MCNC: 1.47 MG/DL (ref 0.5–0.9)
CREAT SERPL-MCNC: 1.49 MG/DL (ref 0.5–0.9)
CREAT SERPL-MCNC: 1.52 MG/DL (ref 0.5–0.9)
CREAT SERPL-MCNC: 1.54 MG/DL (ref 0.5–0.9)
CREAT SERPL-MCNC: 1.59 MG/DL (ref 0.5–0.9)
CREAT SERPL-MCNC: 1.6 MG/DL (ref 0.5–0.9)
CREAT SERPL-MCNC: 1.81 MG/DL (ref 0.5–0.9)
CREAT SERPL-MCNC: 1.86 MG/DL (ref 0.5–0.9)
CREAT SERPL-MCNC: 2 MG/DL (ref 0.5–0.9)
CREAT SERPL-MCNC: 2.07 MG/DL (ref 0.5–0.9)
CREAT SERPL-MCNC: 2.13 MG/DL (ref 0.5–0.9)
CRYSTALS, UA: ABNORMAL /HPF
CULTURE: ABNORMAL
CULTURE: ABNORMAL
CULTURE: NO GROWTH
CULTURE: NORMAL
CULTURE: NORMAL
DIFFERENTIAL TYPE: ABNORMAL
EKG ATRIAL RATE: 112 BPM
EKG ATRIAL RATE: 124 BPM
EKG ATRIAL RATE: 80 BPM
EKG ATRIAL RATE: 87 BPM
EKG ATRIAL RATE: 95 BPM
EKG ATRIAL RATE: 97 BPM
EKG P AXIS: -24 DEGREES
EKG P AXIS: 4 DEGREES
EKG P AXIS: 60 DEGREES
EKG P AXIS: 62 DEGREES
EKG P AXIS: 76 DEGREES
EKG P-R INTERVAL: 180 MS
EKG P-R INTERVAL: 192 MS
EKG P-R INTERVAL: 210 MS
EKG P-R INTERVAL: 214 MS
EKG P-R INTERVAL: 230 MS
EKG Q-T INTERVAL: 320 MS
EKG Q-T INTERVAL: 358 MS
EKG Q-T INTERVAL: 362 MS
EKG Q-T INTERVAL: 380 MS
EKG Q-T INTERVAL: 394 MS
EKG Q-T INTERVAL: 408 MS
EKG QRS DURATION: 114 MS
EKG QRS DURATION: 118 MS
EKG QRS DURATION: 120 MS
EKG QRS DURATION: 122 MS
EKG QRS DURATION: 122 MS
EKG QRS DURATION: 124 MS
EKG QTC CALCULATION (BAZETT): 436 MS
EKG QTC CALCULATION (BAZETT): 457 MS
EKG QTC CALCULATION (BAZETT): 459 MS
EKG QTC CALCULATION (BAZETT): 470 MS
EKG QTC CALCULATION (BAZETT): 495 MS
EKG QTC CALCULATION (BAZETT): 514 MS
EKG R AXIS: -11 DEGREES
EKG R AXIS: -12 DEGREES
EKG R AXIS: -15 DEGREES
EKG R AXIS: -24 DEGREES
EKG R AXIS: -9 DEGREES
EKG R AXIS: 33 DEGREES
EKG T AXIS: -13 DEGREES
EKG T AXIS: -16 DEGREES
EKG T AXIS: -21 DEGREES
EKG T AXIS: -21 DEGREES
EKG T AXIS: -33 DEGREES
EKG T AXIS: 6 DEGREES
EKG VENTRICULAR RATE: 112 BPM
EKG VENTRICULAR RATE: 124 BPM
EKG VENTRICULAR RATE: 80 BPM
EKG VENTRICULAR RATE: 87 BPM
EKG VENTRICULAR RATE: 95 BPM
EKG VENTRICULAR RATE: 97 BPM
EOSINOPHILS RELATIVE PERCENT: 0 % (ref 1–4)
EOSINOPHILS RELATIVE PERCENT: 1 % (ref 1–4)
EOSINOPHILS RELATIVE PERCENT: 2 % (ref 1–4)
EPITHELIAL CELLS UA: ABNORMAL /HPF (ref 0–25)
ESTIMATED AVERAGE GLUCOSE: 160 MG/DL
FIO2: ABNORMAL
GFR AFRICAN AMERICAN: 27 ML/MIN
GFR AFRICAN AMERICAN: 28 ML/MIN
GFR AFRICAN AMERICAN: 29 ML/MIN
GFR AFRICAN AMERICAN: 31 ML/MIN
GFR AFRICAN AMERICAN: 32 ML/MIN
GFR AFRICAN AMERICAN: 37 ML/MIN
GFR AFRICAN AMERICAN: 37 ML/MIN
GFR AFRICAN AMERICAN: 39 ML/MIN
GFR AFRICAN AMERICAN: 39 ML/MIN
GFR AFRICAN AMERICAN: 40 ML/MIN
GFR AFRICAN AMERICAN: 41 ML/MIN
GFR AFRICAN AMERICAN: 43 ML/MIN
GFR AFRICAN AMERICAN: 45 ML/MIN
GFR AFRICAN AMERICAN: 49 ML/MIN
GFR AFRICAN AMERICAN: 49 ML/MIN
GFR NON-AFRICAN AMERICAN: 22 ML/MIN
GFR NON-AFRICAN AMERICAN: 23 ML/MIN
GFR NON-AFRICAN AMERICAN: 24 ML/MIN
GFR NON-AFRICAN AMERICAN: 26 ML/MIN
GFR NON-AFRICAN AMERICAN: 27 ML/MIN
GFR NON-AFRICAN AMERICAN: 31 ML/MIN
GFR NON-AFRICAN AMERICAN: 31 ML/MIN
GFR NON-AFRICAN AMERICAN: 32 ML/MIN
GFR NON-AFRICAN AMERICAN: 33 ML/MIN
GFR NON-AFRICAN AMERICAN: 33 ML/MIN
GFR NON-AFRICAN AMERICAN: 34 ML/MIN
GFR NON-AFRICAN AMERICAN: 35 ML/MIN
GFR NON-AFRICAN AMERICAN: 37 ML/MIN
GFR NON-AFRICAN AMERICAN: 40 ML/MIN
GFR NON-AFRICAN AMERICAN: 41 ML/MIN
GFR SERPL CREATININE-BSD FRML MDRD: ABNORMAL ML/MIN/{1.73_M2}
GLUCOSE BLD-MCNC: 113 MG/DL (ref 74–100)
GLUCOSE BLD-MCNC: 118 MG/DL (ref 70–99)
GLUCOSE BLD-MCNC: 122 MG/DL (ref 74–100)
GLUCOSE BLD-MCNC: 127 MG/DL (ref 70–99)
GLUCOSE BLD-MCNC: 130 MG/DL (ref 74–100)
GLUCOSE BLD-MCNC: 133 MG/DL (ref 74–100)
GLUCOSE BLD-MCNC: 137 MG/DL (ref 74–100)
GLUCOSE BLD-MCNC: 138 MG/DL (ref 74–100)
GLUCOSE BLD-MCNC: 141 MG/DL (ref 70–99)
GLUCOSE BLD-MCNC: 141 MG/DL (ref 74–100)
GLUCOSE BLD-MCNC: 151 MG/DL (ref 70–99)
GLUCOSE BLD-MCNC: 151 MG/DL (ref 74–100)
GLUCOSE BLD-MCNC: 153 MG/DL (ref 70–99)
GLUCOSE BLD-MCNC: 153 MG/DL (ref 74–100)
GLUCOSE BLD-MCNC: 155 MG/DL (ref 70–99)
GLUCOSE BLD-MCNC: 157 MG/DL (ref 74–100)
GLUCOSE BLD-MCNC: 159 MG/DL (ref 70–99)
GLUCOSE BLD-MCNC: 161 MG/DL (ref 74–100)
GLUCOSE BLD-MCNC: 163 MG/DL (ref 74–100)
GLUCOSE BLD-MCNC: 167 MG/DL (ref 74–100)
GLUCOSE BLD-MCNC: 171 MG/DL (ref 70–99)
GLUCOSE BLD-MCNC: 171 MG/DL (ref 74–100)
GLUCOSE BLD-MCNC: 172 MG/DL (ref 74–100)
GLUCOSE BLD-MCNC: 177 MG/DL (ref 74–100)
GLUCOSE BLD-MCNC: 185 MG/DL (ref 74–100)
GLUCOSE BLD-MCNC: 187 MG/DL (ref 70–99)
GLUCOSE BLD-MCNC: 191 MG/DL (ref 74–100)
GLUCOSE BLD-MCNC: 200 MG/DL (ref 74–100)
GLUCOSE BLD-MCNC: 209 MG/DL (ref 70–99)
GLUCOSE BLD-MCNC: 219 MG/DL (ref 74–100)
GLUCOSE BLD-MCNC: 226 MG/DL (ref 74–100)
GLUCOSE BLD-MCNC: 228 MG/DL (ref 70–99)
GLUCOSE BLD-MCNC: 253 MG/DL (ref 70–99)
GLUCOSE BLD-MCNC: 254 MG/DL (ref 74–100)
GLUCOSE BLD-MCNC: 259 MG/DL (ref 70–99)
GLUCOSE BLD-MCNC: 266 MG/DL (ref 74–100)
GLUCOSE BLD-MCNC: 275 MG/DL (ref 74–100)
GLUCOSE BLD-MCNC: 316 MG/DL (ref 70–99)
GLUCOSE BLD-MCNC: 337 MG/DL (ref 74–100)
GLUCOSE BLD-MCNC: 381 MG/DL (ref 74–100)
GLUCOSE BLD-MCNC: 385 MG/DL (ref 70–99)
GLUCOSE BLD-MCNC: 424 MG/DL (ref 74–100)
GLUCOSE BLD-MCNC: 429 MG/DL (ref 74–100)
GLUCOSE URINE: ABNORMAL
GLUCOSE URINE: NEGATIVE
GLUCOSE URINE: NEGATIVE
HBA1C MFR BLD: 6.4 %
HBA1C MFR BLD: 7.2 % (ref 4.8–5.9)
HCO3 ARTERIAL: 18.7 MMOL/L (ref 22–26)
HCO3 VENOUS: 18.7 MMOL/L (ref 24–30)
HCO3 VENOUS: 23.4 MMOL/L (ref 24–30)
HCT VFR BLD CALC: 26.8 % (ref 36.3–47.1)
HCT VFR BLD CALC: 26.9 % (ref 36.3–47.1)
HCT VFR BLD CALC: 27.6 % (ref 36.3–47.1)
HCT VFR BLD CALC: 28.8 % (ref 36.3–47.1)
HCT VFR BLD CALC: 29.4 % (ref 36.3–47.1)
HCT VFR BLD CALC: 29.7 % (ref 36.3–47.1)
HCT VFR BLD CALC: 30.5 % (ref 36.3–47.1)
HCT VFR BLD CALC: 30.8 % (ref 36.3–47.1)
HCT VFR BLD CALC: 30.8 % (ref 36.3–47.1)
HCT VFR BLD CALC: 31.5 % (ref 36.3–47.1)
HCT VFR BLD CALC: 32.8 % (ref 36.3–47.1)
HCT VFR BLD CALC: 35.1 % (ref 36.3–47.1)
HCT VFR BLD CALC: 37 % (ref 36.3–47.1)
HCT VFR BLD CALC: 39.3 % (ref 36.3–47.1)
HEMOGLOBIN: 10 G/DL (ref 11.9–15.1)
HEMOGLOBIN: 10.8 G/DL (ref 11.9–15.1)
HEMOGLOBIN: 11 G/DL (ref 11.9–15.1)
HEMOGLOBIN: 11.4 G/DL (ref 11.9–15.1)
HEMOGLOBIN: 8.4 G/DL (ref 11.9–15.1)
HEMOGLOBIN: 8.5 G/DL (ref 11.9–15.1)
HEMOGLOBIN: 8.6 G/DL (ref 11.9–15.1)
HEMOGLOBIN: 8.8 G/DL (ref 11.9–15.1)
HEMOGLOBIN: 9.1 G/DL (ref 11.9–15.1)
HEMOGLOBIN: 9.3 G/DL (ref 11.9–15.1)
HEMOGLOBIN: 9.3 G/DL (ref 11.9–15.1)
HEMOGLOBIN: 9.5 G/DL (ref 11.9–15.1)
HEMOGLOBIN: 9.6 G/DL (ref 11.9–15.1)
HEMOGLOBIN: 9.6 G/DL (ref 11.9–15.1)
IMMATURE GRANULOCYTES: 0 %
IMMATURE GRANULOCYTES: 1 %
IMMATURE GRANULOCYTES: 1 %
IMMATURE GRANULOCYTES: 2 %
IMMATURE GRANULOCYTES: 3 %
IMMATURE GRANULOCYTES: 3 %
IMMATURE GRANULOCYTES: 4 %
IMMATURE GRANULOCYTES: 5 %
IMMATURE GRANULOCYTES: 5 %
KETONES, URINE: ABNORMAL
KETONES, URINE: NEGATIVE
KETONES, URINE: NEGATIVE
LACTIC ACID, WHOLE BLOOD: ABNORMAL MMOL/L (ref 0.7–2.1)
LACTIC ACID, WHOLE BLOOD: NORMAL MMOL/L (ref 0.7–2.1)
LACTIC ACID: 2 MMOL/L (ref 0.5–2.2)
LACTIC ACID: 3 MMOL/L (ref 0.5–2.2)
LEUKOCYTE ESTERASE, URINE: NEGATIVE
LV EF: 60 %
LV EF: 70 %
LVEF MODALITY: NORMAL
LVEF MODALITY: NORMAL
LYMPHOCYTES # BLD: 11 % (ref 24–43)
LYMPHOCYTES # BLD: 12 % (ref 24–43)
LYMPHOCYTES # BLD: 14 % (ref 24–43)
LYMPHOCYTES # BLD: 14 % (ref 24–43)
LYMPHOCYTES # BLD: 15 % (ref 24–43)
LYMPHOCYTES # BLD: 16 % (ref 24–43)
LYMPHOCYTES # BLD: 18 % (ref 24–43)
LYMPHOCYTES # BLD: 21 % (ref 24–43)
LYMPHOCYTES # BLD: 23 % (ref 24–43)
LYMPHOCYTES # BLD: 4 % (ref 24–43)
LYMPHOCYTES # BLD: 4 % (ref 24–43)
LYMPHOCYTES # BLD: 8 % (ref 24–43)
Lab: ABNORMAL
Lab: ABNORMAL
Lab: NORMAL
MAGNESIUM: 1.1 MG/DL (ref 1.6–2.6)
MAGNESIUM: 2.2 MG/DL (ref 1.6–2.6)
MAGNESIUM: 2.6 MG/DL (ref 1.6–2.6)
MCH RBC QN AUTO: 28.5 PG (ref 25.2–33.5)
MCH RBC QN AUTO: 29.2 PG (ref 25.2–33.5)
MCH RBC QN AUTO: 29.2 PG (ref 25.2–33.5)
MCH RBC QN AUTO: 29.3 PG (ref 25.2–33.5)
MCH RBC QN AUTO: 29.3 PG (ref 25.2–33.5)
MCH RBC QN AUTO: 29.4 PG (ref 25.2–33.5)
MCH RBC QN AUTO: 29.4 PG (ref 25.2–33.5)
MCH RBC QN AUTO: 29.5 PG (ref 25.2–33.5)
MCH RBC QN AUTO: 29.5 PG (ref 25.2–33.5)
MCH RBC QN AUTO: 29.6 PG (ref 25.2–33.5)
MCH RBC QN AUTO: 29.8 PG (ref 25.2–33.5)
MCH RBC QN AUTO: 29.9 PG (ref 25.2–33.5)
MCH RBC QN AUTO: 30.3 PG (ref 25.2–33.5)
MCH RBC QN AUTO: 30.7 PG (ref 25.2–33.5)
MCHC RBC AUTO-ENTMCNC: 29 G/DL (ref 28.4–34.8)
MCHC RBC AUTO-ENTMCNC: 29.5 G/DL (ref 28.4–34.8)
MCHC RBC AUTO-ENTMCNC: 29.7 G/DL (ref 28.4–34.8)
MCHC RBC AUTO-ENTMCNC: 29.9 G/DL (ref 28.4–34.8)
MCHC RBC AUTO-ENTMCNC: 30.5 G/DL (ref 28.4–34.8)
MCHC RBC AUTO-ENTMCNC: 30.5 G/DL (ref 28.4–34.8)
MCHC RBC AUTO-ENTMCNC: 30.8 G/DL (ref 28.4–34.8)
MCHC RBC AUTO-ENTMCNC: 31.2 G/DL (ref 28.4–34.8)
MCHC RBC AUTO-ENTMCNC: 31.3 G/DL (ref 28.4–34.8)
MCHC RBC AUTO-ENTMCNC: 31.5 G/DL (ref 28.4–34.8)
MCHC RBC AUTO-ENTMCNC: 31.6 G/DL (ref 28.4–34.8)
MCHC RBC AUTO-ENTMCNC: 32.8 G/DL (ref 28.4–34.8)
MCV RBC AUTO: 101.3 FL (ref 82.6–102.9)
MCV RBC AUTO: 92.7 FL (ref 82.6–102.9)
MCV RBC AUTO: 93.4 FL (ref 82.6–102.9)
MCV RBC AUTO: 94.4 FL (ref 82.6–102.9)
MCV RBC AUTO: 94.6 FL (ref 82.6–102.9)
MCV RBC AUTO: 95.4 FL (ref 82.6–102.9)
MCV RBC AUTO: 95.6 FL (ref 82.6–102.9)
MCV RBC AUTO: 95.7 FL (ref 82.6–102.9)
MCV RBC AUTO: 95.8 FL (ref 82.6–102.9)
MCV RBC AUTO: 96.2 FL (ref 82.6–102.9)
MCV RBC AUTO: 97 FL (ref 82.6–102.9)
MCV RBC AUTO: 98 FL (ref 82.6–102.9)
MCV RBC AUTO: 98.3 FL (ref 82.6–102.9)
MCV RBC AUTO: 98.9 FL (ref 82.6–102.9)
METHEMOGLOBIN: ABNORMAL % (ref 0–1.9)
MODE: ABNORMAL
MONOCYTES # BLD: 0 % (ref 3–12)
MONOCYTES # BLD: 1 % (ref 3–12)
MONOCYTES # BLD: 10 % (ref 3–12)
MONOCYTES # BLD: 11 % (ref 3–12)
MONOCYTES # BLD: 13 % (ref 3–12)
MONOCYTES # BLD: 14 % (ref 3–12)
MONOCYTES # BLD: 15 % (ref 3–12)
MONOCYTES # BLD: 2 % (ref 3–12)
MONOCYTES # BLD: 24 % (ref 3–12)
MONOCYTES # BLD: 8 % (ref 3–12)
MONOCYTES # BLD: 8 % (ref 3–12)
MONOCYTES # BLD: 9 % (ref 3–12)
MORPHOLOGY: ABNORMAL
MORPHOLOGY: NORMAL
MUCUS: ABNORMAL
NEGATIVE BASE EXCESS, ART: 5.1 MMOL/L (ref 0–2)
NEGATIVE BASE EXCESS, VEN: 2.2 MMOL/L (ref 0–2)
NEGATIVE BASE EXCESS, VEN: 5 MMOL/L (ref 0–2)
NITRITE, URINE: NEGATIVE
NITRITE, URINE: POSITIVE
NITRITE, URINE: POSITIVE
NOTIFICATION TIME: ABNORMAL
NOTIFICATION: ABNORMAL
NRBC AUTOMATED: 0 PER 100 WBC
NUCLEATED RED BLOOD CELLS: 1 PER 100 WBC (ref 0–5)
O2 DEVICE/FLOW/%: ABNORMAL
O2 SAT, ARTERIAL: 96.1 % (ref 95–98)
O2 SAT, VEN: 35.3 % (ref 60–85)
O2 SAT, VEN: 76.9 % (ref 60–85)
ORGANISM: ABNORMAL
OTHER OBSERVATIONS UA: ABNORMAL
OXYHEMOGLOBIN: ABNORMAL % (ref 95–98)
PATIENT TEMP: 37
PATIENT TEMP: 37
PATIENT TEMP: ABNORMAL
PCO2 ARTERIAL: 31.8 MMHG (ref 35–45)
PCO2, ART, TEMP ADJ: ABNORMAL
PCO2, VEN, TEMP ADJ: ABNORMAL MMHG (ref 39–55)
PCO2, VEN, TEMP ADJ: ABNORMAL MMHG (ref 39–55)
PCO2, VEN: 31.4 (ref 39–55)
PCO2, VEN: 43.4 (ref 39–55)
PDW BLD-RTO: 14.1 % (ref 11.8–14.4)
PDW BLD-RTO: 14.3 % (ref 11.8–14.4)
PDW BLD-RTO: 14.3 % (ref 11.8–14.4)
PDW BLD-RTO: 14.6 % (ref 11.8–14.4)
PDW BLD-RTO: 15.1 % (ref 11.8–14.4)
PDW BLD-RTO: 15.4 % (ref 11.8–14.4)
PDW BLD-RTO: 15.4 % (ref 11.8–14.4)
PDW BLD-RTO: 15.7 % (ref 11.8–14.4)
PDW BLD-RTO: 16.4 % (ref 11.8–14.4)
PDW BLD-RTO: 17.6 % (ref 11.8–14.4)
PDW BLD-RTO: 17.9 % (ref 11.8–14.4)
PDW BLD-RTO: 18.6 % (ref 11.8–14.4)
PDW BLD-RTO: 19.1 % (ref 11.8–14.4)
PDW BLD-RTO: 20.5 % (ref 11.8–14.4)
PEEP/CPAP: ABNORMAL
PH ARTERIAL: 7.39 (ref 7.35–7.45)
PH UA: 5.5 (ref 5–9)
PH UA: 5.5 (ref 5–9)
PH UA: 6 (ref 5–9)
PH VENOUS: 7.35 (ref 7.32–7.42)
PH VENOUS: 7.39 (ref 7.32–7.42)
PH, ART, TEMP ADJ: ABNORMAL (ref 7.35–7.45)
PH, VEN, TEMP ADJ: ABNORMAL (ref 7.32–7.42)
PH, VEN, TEMP ADJ: ABNORMAL (ref 7.32–7.42)
PLATELET # BLD: 196 K/UL (ref 138–453)
PLATELET # BLD: 215 K/UL (ref 138–453)
PLATELET # BLD: 219 K/UL (ref 138–453)
PLATELET # BLD: 222 K/UL (ref 138–453)
PLATELET # BLD: 233 K/UL (ref 138–453)
PLATELET # BLD: 237 K/UL (ref 138–453)
PLATELET # BLD: 241 K/UL (ref 138–453)
PLATELET # BLD: 245 K/UL (ref 138–453)
PLATELET # BLD: 246 K/UL (ref 138–453)
PLATELET # BLD: 253 K/UL (ref 138–453)
PLATELET # BLD: 266 K/UL (ref 138–453)
PLATELET # BLD: 280 K/UL (ref 138–453)
PLATELET # BLD: 303 K/UL (ref 138–453)
PLATELET # BLD: 322 K/UL (ref 138–453)
PLATELET ESTIMATE: ABNORMAL
PMV BLD AUTO: 10 FL (ref 8.1–13.5)
PMV BLD AUTO: 10.1 FL (ref 8.1–13.5)
PMV BLD AUTO: 10.1 FL (ref 8.1–13.5)
PMV BLD AUTO: 10.3 FL (ref 8.1–13.5)
PMV BLD AUTO: 10.4 FL (ref 8.1–13.5)
PMV BLD AUTO: 10.4 FL (ref 8.1–13.5)
PMV BLD AUTO: 9.3 FL (ref 8.1–13.5)
PMV BLD AUTO: 9.4 FL (ref 8.1–13.5)
PMV BLD AUTO: 9.5 FL (ref 8.1–13.5)
PMV BLD AUTO: 9.5 FL (ref 8.1–13.5)
PMV BLD AUTO: 9.7 FL (ref 8.1–13.5)
PO2 ARTERIAL: 82 MMHG (ref 80–100)
PO2, ART, TEMP ADJ: ABNORMAL MMHG (ref 80–100)
PO2, VEN, TEMP ADJ: ABNORMAL MMHG (ref 30–50)
PO2, VEN, TEMP ADJ: ABNORMAL MMHG (ref 30–50)
PO2, VEN: 22.2 (ref 30–50)
PO2, VEN: 41.1 (ref 30–50)
POSITIVE BASE EXCESS, ART: ABNORMAL MMOL/L (ref 0–2)
POSITIVE BASE EXCESS, VEN: ABNORMAL MMOL/L (ref 0–2)
POSITIVE BASE EXCESS, VEN: ABNORMAL MMOL/L (ref 0–2)
POTASSIUM SERPL-SCNC: 3.1 MMOL/L (ref 3.7–5.3)
POTASSIUM SERPL-SCNC: 3.4 MMOL/L (ref 3.7–5.3)
POTASSIUM SERPL-SCNC: 3.5 MMOL/L (ref 3.7–5.3)
POTASSIUM SERPL-SCNC: 3.8 MMOL/L (ref 3.7–5.3)
POTASSIUM SERPL-SCNC: 4 MMOL/L (ref 3.7–5.3)
POTASSIUM SERPL-SCNC: 4 MMOL/L (ref 3.7–5.3)
POTASSIUM SERPL-SCNC: 4.1 MMOL/L (ref 3.7–5.3)
POTASSIUM SERPL-SCNC: 4.2 MMOL/L (ref 3.7–5.3)
POTASSIUM SERPL-SCNC: 4.3 MMOL/L (ref 3.7–5.3)
POTASSIUM SERPL-SCNC: 4.5 MMOL/L (ref 3.7–5.3)
POTASSIUM SERPL-SCNC: 4.6 MMOL/L (ref 3.7–5.3)
POTASSIUM SERPL-SCNC: 4.7 MMOL/L (ref 3.7–5.3)
POTASSIUM SERPL-SCNC: 4.9 MMOL/L (ref 3.7–5.3)
POTASSIUM SERPL-SCNC: 5.1 MMOL/L (ref 3.7–5.3)
PRO-BNP: 1383 PG/ML
PRO-BNP: 1784 PG/ML
PRO-BNP: 3840 PG/ML
PRO-BNP: 491 PG/ML
PRO-BNP: 883 PG/ML
PROTEIN UA: ABNORMAL
PSV: ABNORMAL
PT. POSITION: ABNORMAL
RBC # BLD: 2.85 M/UL (ref 3.95–5.11)
RBC # BLD: 2.87 M/UL (ref 3.95–5.11)
RBC # BLD: 2.88 M/UL (ref 3.95–5.11)
RBC # BLD: 2.94 M/UL (ref 3.95–5.11)
RBC # BLD: 3.04 M/UL (ref 3.95–5.11)
RBC # BLD: 3.14 M/UL (ref 3.95–5.11)
RBC # BLD: 3.17 M/UL (ref 3.95–5.11)
RBC # BLD: 3.17 M/UL (ref 3.95–5.11)
RBC # BLD: 3.23 M/UL (ref 3.95–5.11)
RBC # BLD: 3.29 M/UL (ref 3.95–5.11)
RBC # BLD: 3.43 M/UL (ref 3.95–5.11)
RBC # BLD: 3.62 M/UL (ref 3.95–5.11)
RBC # BLD: 3.74 M/UL (ref 3.95–5.11)
RBC # BLD: 4 M/UL (ref 3.95–5.11)
RBC # BLD: ABNORMAL 10*6/UL
RBC UA: ABNORMAL /HPF (ref 0–2)
RENAL EPITHELIAL, UA: ABNORMAL /HPF
RESPIRATORY RATE: 28
RESPIRATORY RATE: ABNORMAL
RESPIRATORY RATE: ABNORMAL
SAMPLE SITE: ABNORMAL
SEG NEUTROPHILS: 59 % (ref 36–65)
SEG NEUTROPHILS: 65 % (ref 36–65)
SEG NEUTROPHILS: 67 % (ref 36–65)
SEG NEUTROPHILS: 67 % (ref 36–65)
SEG NEUTROPHILS: 68 % (ref 36–65)
SEG NEUTROPHILS: 69 % (ref 36–65)
SEG NEUTROPHILS: 70 % (ref 36–65)
SEG NEUTROPHILS: 72 % (ref 36–65)
SEG NEUTROPHILS: 72 % (ref 36–65)
SEG NEUTROPHILS: 75 % (ref 36–65)
SEG NEUTROPHILS: 94 % (ref 36–65)
SEG NEUTROPHILS: 95 % (ref 36–65)
SEGMENTED NEUTROPHILS ABSOLUTE COUNT: 2.51 K/UL (ref 1.5–8.1)
SEGMENTED NEUTROPHILS ABSOLUTE COUNT: 2.68 K/UL (ref 1.5–8.1)
SEGMENTED NEUTROPHILS ABSOLUTE COUNT: 2.87 K/UL (ref 1.5–8.1)
SEGMENTED NEUTROPHILS ABSOLUTE COUNT: 21.81 K/UL (ref 1.5–8.1)
SEGMENTED NEUTROPHILS ABSOLUTE COUNT: 3.27 K/UL (ref 1.5–8.1)
SEGMENTED NEUTROPHILS ABSOLUTE COUNT: 3.52 K/UL (ref 1.5–8.1)
SEGMENTED NEUTROPHILS ABSOLUTE COUNT: 4.27 K/UL (ref 1.5–8.1)
SEGMENTED NEUTROPHILS ABSOLUTE COUNT: 4.43 K/UL (ref 1.5–8.1)
SEGMENTED NEUTROPHILS ABSOLUTE COUNT: 4.71 K/UL (ref 1.5–8.1)
SEGMENTED NEUTROPHILS ABSOLUTE COUNT: 4.81 K/UL (ref 1.5–8.1)
SEGMENTED NEUTROPHILS ABSOLUTE COUNT: 4.94 K/UL (ref 1.5–8.1)
SEGMENTED NEUTROPHILS ABSOLUTE COUNT: 5.49 K/UL (ref 1.5–8.1)
SET RATE: ABNORMAL
SODIUM BLD-SCNC: 136 MMOL/L (ref 135–144)
SODIUM BLD-SCNC: 139 MMOL/L (ref 135–144)
SODIUM BLD-SCNC: 140 MMOL/L (ref 135–144)
SODIUM BLD-SCNC: 141 MMOL/L (ref 135–144)
SODIUM BLD-SCNC: 144 MMOL/L (ref 135–144)
SODIUM BLD-SCNC: 145 MMOL/L (ref 135–144)
SODIUM BLD-SCNC: 146 MMOL/L (ref 135–144)
SODIUM BLD-SCNC: 146 MMOL/L (ref 135–144)
SPECIFIC GRAVITY UA: 1.01 (ref 1.01–1.02)
SPECIFIC GRAVITY UA: 1.02 (ref 1.01–1.02)
SPECIFIC GRAVITY UA: 1.02 (ref 1.01–1.02)
SPECIMEN DESCRIPTION: ABNORMAL
SPECIMEN DESCRIPTION: ABNORMAL
SPECIMEN DESCRIPTION: NORMAL
STATUS: ABNORMAL
TEXT FOR RESPIRATORY: ABNORMAL
TOTAL HB: ABNORMAL G/DL (ref 12–16)
TOTAL PROTEIN: 4.6 G/DL (ref 6.4–8.3)
TOTAL PROTEIN: 4.6 G/DL (ref 6.4–8.3)
TOTAL PROTEIN: 4.8 G/DL (ref 6.4–8.3)
TOTAL PROTEIN: 5.1 G/DL (ref 6.4–8.3)
TOTAL PROTEIN: 5.7 G/DL (ref 6.4–8.3)
TOTAL PROTEIN: 5.8 G/DL (ref 6.4–8.3)
TOTAL PROTEIN: 6 G/DL (ref 6.4–8.3)
TOTAL PROTEIN: 6.1 G/DL (ref 6.4–8.3)
TOTAL PROTEIN: 6.8 G/DL (ref 6.4–8.3)
TOTAL RATE: ABNORMAL
TRICHOMONAS: ABNORMAL
TROPONIN INTERP: NORMAL
TROPONIN T: <0.03 NG/ML
TROPONIN, HIGH SENSITIVITY: NORMAL NG/L (ref 0–14)
TSH SERPL DL<=0.05 MIU/L-ACNC: 4.26 MIU/L (ref 0.3–5)
TURBIDITY: ABNORMAL
TURBIDITY: CLEAR
TURBIDITY: CLEAR
URINE HGB: NEGATIVE
UROBILINOGEN, URINE: NORMAL
VT: ABNORMAL
WBC # BLD: 23.2 K/UL (ref 3.5–11.3)
WBC # BLD: 4 K/UL (ref 3.5–11.3)
WBC # BLD: 4.3 K/UL (ref 3.5–11.3)
WBC # BLD: 4.4 K/UL (ref 3.5–11.3)
WBC # BLD: 4.8 K/UL (ref 3.5–11.3)
WBC # BLD: 4.9 K/UL (ref 3.5–11.3)
WBC # BLD: 5.2 K/UL (ref 3.5–11.3)
WBC # BLD: 6.2 K/UL (ref 3.5–11.3)
WBC # BLD: 6.3 K/UL (ref 3.5–11.3)
WBC # BLD: 6.6 K/UL (ref 3.5–11.3)
WBC # BLD: 6.7 K/UL (ref 3.5–11.3)
WBC # BLD: 7.7 K/UL (ref 3.5–11.3)
WBC # BLD: 8.6 K/UL (ref 3.5–11.3)
WBC # BLD: 8.9 K/UL (ref 3.5–11.3)
WBC # BLD: ABNORMAL 10*3/UL
WBC UA: ABNORMAL /HPF (ref 0–5)
YEAST: ABNORMAL

## 2019-01-01 PROCEDURE — 2580000003 HC RX 258: Performed by: PHYSICIAN ASSISTANT

## 2019-01-01 PROCEDURE — 1123F ACP DISCUSS/DSCN MKR DOCD: CPT | Performed by: FAMILY MEDICINE

## 2019-01-01 PROCEDURE — 87449 NOS EACH ORGANISM AG IA: CPT

## 2019-01-01 PROCEDURE — 1101F PT FALLS ASSESS-DOCD LE1/YR: CPT | Performed by: INTERNAL MEDICINE

## 2019-01-01 PROCEDURE — 92610 EVALUATE SWALLOWING FUNCTION: CPT

## 2019-01-01 PROCEDURE — 85027 COMPLETE CBC AUTOMATED: CPT

## 2019-01-01 PROCEDURE — 71046 X-RAY EXAM CHEST 2 VIEWS: CPT

## 2019-01-01 PROCEDURE — G8400 PT W/DXA NO RESULTS DOC: HCPCS | Performed by: INTERNAL MEDICINE

## 2019-01-01 PROCEDURE — 97110 THERAPEUTIC EXERCISES: CPT

## 2019-01-01 PROCEDURE — 93010 ELECTROCARDIOGRAM REPORT: CPT | Performed by: FAMILY MEDICINE

## 2019-01-01 PROCEDURE — 2580000003 HC RX 258: Performed by: INTERNAL MEDICINE

## 2019-01-01 PROCEDURE — 82947 ASSAY GLUCOSE BLOOD QUANT: CPT

## 2019-01-01 PROCEDURE — 85025 COMPLETE CBC W/AUTO DIFF WBC: CPT

## 2019-01-01 PROCEDURE — 36415 COLL VENOUS BLD VENIPUNCTURE: CPT

## 2019-01-01 PROCEDURE — 94640 AIRWAY INHALATION TREATMENT: CPT

## 2019-01-01 PROCEDURE — 87077 CULTURE AEROBIC IDENTIFY: CPT

## 2019-01-01 PROCEDURE — A9552 F18 FDG: HCPCS | Performed by: INTERNAL MEDICINE

## 2019-01-01 PROCEDURE — 92507 TX SP LANG VOICE COMM INDIV: CPT

## 2019-01-01 PROCEDURE — 83880 ASSAY OF NATRIURETIC PEPTIDE: CPT

## 2019-01-01 PROCEDURE — 6360000002 HC RX W HCPCS: Performed by: INTERNAL MEDICINE

## 2019-01-01 PROCEDURE — 1123F ACP DISCUSS/DSCN MKR DOCD: CPT | Performed by: INTERNAL MEDICINE

## 2019-01-01 PROCEDURE — 82805 BLOOD GASES W/O2 SATURATION: CPT

## 2019-01-01 PROCEDURE — 6370000000 HC RX 637 (ALT 250 FOR IP): Performed by: PHYSICIAN ASSISTANT

## 2019-01-01 PROCEDURE — 93306 TTE W/DOPPLER COMPLETE: CPT

## 2019-01-01 PROCEDURE — 71045 X-RAY EXAM CHEST 1 VIEW: CPT

## 2019-01-01 PROCEDURE — 77063 BREAST TOMOSYNTHESIS BI: CPT

## 2019-01-01 PROCEDURE — G8484 FLU IMMUNIZE NO ADMIN: HCPCS | Performed by: INTERNAL MEDICINE

## 2019-01-01 PROCEDURE — G8417 CALC BMI ABV UP PARAM F/U: HCPCS | Performed by: INTERNAL MEDICINE

## 2019-01-01 PROCEDURE — G8427 DOCREV CUR MEDS BY ELIG CLIN: HCPCS | Performed by: INTERNAL MEDICINE

## 2019-01-01 PROCEDURE — 80053 COMPREHEN METABOLIC PANEL: CPT

## 2019-01-01 PROCEDURE — 6370000000 HC RX 637 (ALT 250 FOR IP): Performed by: INTERNAL MEDICINE

## 2019-01-01 PROCEDURE — 99285 EMERGENCY DEPT VISIT HI MDM: CPT

## 2019-01-01 PROCEDURE — 78815 PET IMAGE W/CT SKULL-THIGH: CPT

## 2019-01-01 PROCEDURE — 4040F PNEUMOC VAC/ADMIN/RCVD: CPT | Performed by: FAMILY MEDICINE

## 2019-01-01 PROCEDURE — 86300 IMMUNOASSAY TUMOR CA 15-3: CPT

## 2019-01-01 PROCEDURE — 6360000002 HC RX W HCPCS: Performed by: PHYSICIAN ASSISTANT

## 2019-01-01 PROCEDURE — 84484 ASSAY OF TROPONIN QUANT: CPT

## 2019-01-01 PROCEDURE — 97161 PT EVAL LOW COMPLEX 20 MIN: CPT

## 2019-01-01 PROCEDURE — 3430000000 HC RX DIAGNOSTIC RADIOPHARMACEUTICAL: Performed by: INTERNAL MEDICINE

## 2019-01-01 PROCEDURE — G8417 CALC BMI ABV UP PARAM F/U: HCPCS | Performed by: FAMILY MEDICINE

## 2019-01-01 PROCEDURE — 6370000000 HC RX 637 (ALT 250 FOR IP): Performed by: EMERGENCY MEDICINE

## 2019-01-01 PROCEDURE — 83036 HEMOGLOBIN GLYCOSYLATED A1C: CPT | Performed by: FAMILY MEDICINE

## 2019-01-01 PROCEDURE — 99284 EMERGENCY DEPT VISIT MOD MDM: CPT | Performed by: INTERNAL MEDICINE

## 2019-01-01 PROCEDURE — G8427 DOCREV CUR MEDS BY ELIG CLIN: HCPCS | Performed by: FAMILY MEDICINE

## 2019-01-01 PROCEDURE — 97162 PT EVAL MOD COMPLEX 30 MIN: CPT

## 2019-01-01 PROCEDURE — 97116 GAIT TRAINING THERAPY: CPT

## 2019-01-01 PROCEDURE — 4040F PNEUMOC VAC/ADMIN/RCVD: CPT | Performed by: INTERNAL MEDICINE

## 2019-01-01 PROCEDURE — G8987 SELF CARE CURRENT STATUS: HCPCS

## 2019-01-01 PROCEDURE — 93005 ELECTROCARDIOGRAM TRACING: CPT | Performed by: INTERNAL MEDICINE

## 2019-01-01 PROCEDURE — G8926 SPIRO NO PERF OR DOC: HCPCS | Performed by: INTERNAL MEDICINE

## 2019-01-01 PROCEDURE — 80048 BASIC METABOLIC PNL TOTAL CA: CPT

## 2019-01-01 PROCEDURE — 99215 OFFICE O/P EST HI 40 MIN: CPT | Performed by: INTERNAL MEDICINE

## 2019-01-01 PROCEDURE — 1090F PRES/ABSN URINE INCON ASSESS: CPT | Performed by: INTERNAL MEDICINE

## 2019-01-01 PROCEDURE — 92526 ORAL FUNCTION THERAPY: CPT

## 2019-01-01 PROCEDURE — 1200000000 HC SEMI PRIVATE

## 2019-01-01 PROCEDURE — 84443 ASSAY THYROID STIM HORMONE: CPT

## 2019-01-01 PROCEDURE — 93005 ELECTROCARDIOGRAM TRACING: CPT | Performed by: EMERGENCY MEDICINE

## 2019-01-01 PROCEDURE — 84132 ASSAY OF SERUM POTASSIUM: CPT

## 2019-01-01 PROCEDURE — 96365 THER/PROPH/DIAG IV INF INIT: CPT

## 2019-01-01 PROCEDURE — 93226 XTRNL ECG REC<48 HR SCAN A/R: CPT

## 2019-01-01 PROCEDURE — 83605 ASSAY OF LACTIC ACID: CPT

## 2019-01-01 PROCEDURE — 87186 SC STD MICRODIL/AGAR DIL: CPT

## 2019-01-01 PROCEDURE — 81001 URINALYSIS AUTO W/SCOPE: CPT

## 2019-01-01 PROCEDURE — 99214 OFFICE O/P EST MOD 30 MIN: CPT | Performed by: INTERNAL MEDICINE

## 2019-01-01 PROCEDURE — 94664 DEMO&/EVAL PT USE INHALER: CPT

## 2019-01-01 PROCEDURE — 83735 ASSAY OF MAGNESIUM: CPT

## 2019-01-01 PROCEDURE — 93010 ELECTROCARDIOGRAM REPORT: CPT | Performed by: INTERNAL MEDICINE

## 2019-01-01 PROCEDURE — 74230 X-RAY XM SWLNG FUNCJ C+: CPT

## 2019-01-01 PROCEDURE — 99232 SBSQ HOSP IP/OBS MODERATE 35: CPT | Performed by: INTERNAL MEDICINE

## 2019-01-01 PROCEDURE — 93005 ELECTROCARDIOGRAM TRACING: CPT

## 2019-01-01 PROCEDURE — 70490 CT SOFT TISSUE NECK W/O DYE: CPT

## 2019-01-01 PROCEDURE — 93000 ELECTROCARDIOGRAM COMPLETE: CPT | Performed by: INTERNAL MEDICINE

## 2019-01-01 PROCEDURE — 2700000000 HC OXYGEN THERAPY PER DAY

## 2019-01-01 PROCEDURE — 6370000000 HC RX 637 (ALT 250 FOR IP)

## 2019-01-01 PROCEDURE — G8997 SWALLOW GOAL STATUS: HCPCS

## 2019-01-01 PROCEDURE — 2500000003 HC RX 250 WO HCPCS: Performed by: PHYSICIAN ASSISTANT

## 2019-01-01 PROCEDURE — 96374 THER/PROPH/DIAG INJ IV PUSH: CPT

## 2019-01-01 PROCEDURE — 1036F TOBACCO NON-USER: CPT | Performed by: INTERNAL MEDICINE

## 2019-01-01 PROCEDURE — 87086 URINE CULTURE/COLONY COUNT: CPT

## 2019-01-01 PROCEDURE — 1111F DSCHRG MED/CURRENT MED MERGE: CPT | Performed by: INTERNAL MEDICINE

## 2019-01-01 PROCEDURE — 99221 1ST HOSP IP/OBS SF/LOW 40: CPT | Performed by: INTERNAL MEDICINE

## 2019-01-01 PROCEDURE — 87040 BLOOD CULTURE FOR BACTERIA: CPT

## 2019-01-01 PROCEDURE — G8978 MOBILITY CURRENT STATUS: HCPCS

## 2019-01-01 PROCEDURE — 92522 EVALUATE SPEECH PRODUCTION: CPT

## 2019-01-01 PROCEDURE — 2000000000 HC ICU R&B

## 2019-01-01 PROCEDURE — G8428 CUR MEDS NOT DOCUMENT: HCPCS | Performed by: INTERNAL MEDICINE

## 2019-01-01 PROCEDURE — 94660 CPAP INITIATION&MGMT: CPT

## 2019-01-01 PROCEDURE — 36600 WITHDRAWAL OF ARTERIAL BLOOD: CPT

## 2019-01-01 PROCEDURE — 92611 MOTION FLUOROSCOPY/SWALLOW: CPT

## 2019-01-01 PROCEDURE — 99284 EMERGENCY DEPT VISIT MOD MDM: CPT

## 2019-01-01 PROCEDURE — 96367 TX/PROPH/DG ADDL SEQ IV INF: CPT

## 2019-01-01 PROCEDURE — 97166 OT EVAL MOD COMPLEX 45 MIN: CPT

## 2019-01-01 PROCEDURE — 71250 CT THORAX DX C-: CPT

## 2019-01-01 PROCEDURE — 2580000003 HC RX 258: Performed by: EMERGENCY MEDICINE

## 2019-01-01 PROCEDURE — 6360000004 HC RX CONTRAST MEDICATION: Performed by: INTERNAL MEDICINE

## 2019-01-01 PROCEDURE — 97530 THERAPEUTIC ACTIVITIES: CPT

## 2019-01-01 PROCEDURE — 3023F SPIROM DOC REV: CPT | Performed by: INTERNAL MEDICINE

## 2019-01-01 PROCEDURE — 6360000002 HC RX W HCPCS: Performed by: EMERGENCY MEDICINE

## 2019-01-01 PROCEDURE — 97535 SELF CARE MNGMENT TRAINING: CPT

## 2019-01-01 PROCEDURE — G8988 SELF CARE GOAL STATUS: HCPCS

## 2019-01-01 PROCEDURE — G8996 SWALLOW CURRENT STATUS: HCPCS

## 2019-01-01 PROCEDURE — 83036 HEMOGLOBIN GLYCOSYLATED A1C: CPT

## 2019-01-01 PROCEDURE — 1111F DSCHRG MED/CURRENT MED MERGE: CPT

## 2019-01-01 PROCEDURE — 99214 OFFICE O/P EST MOD 30 MIN: CPT | Performed by: FAMILY MEDICINE

## 2019-01-01 PROCEDURE — 1090F PRES/ABSN URINE INCON ASSESS: CPT | Performed by: FAMILY MEDICINE

## 2019-01-01 PROCEDURE — 87324 CLOSTRIDIUM AG IA: CPT

## 2019-01-01 PROCEDURE — G8400 PT W/DXA NO RESULTS DOC: HCPCS | Performed by: FAMILY MEDICINE

## 2019-01-01 PROCEDURE — G8979 MOBILITY GOAL STATUS: HCPCS

## 2019-01-01 PROCEDURE — 93225 XTRNL ECG REC<48 HRS REC: CPT

## 2019-01-01 PROCEDURE — 1036F TOBACCO NON-USER: CPT | Performed by: FAMILY MEDICINE

## 2019-01-01 PROCEDURE — 70450 CT HEAD/BRAIN W/O DYE: CPT

## 2019-01-01 PROCEDURE — A4641 RADIOPHARM DX AGENT NOC: HCPCS | Performed by: INTERNAL MEDICINE

## 2019-01-01 RX ORDER — DILTIAZEM HYDROCHLORIDE 180 MG/1
180 CAPSULE, COATED, EXTENDED RELEASE ORAL DAILY
COMMUNITY
End: 2019-01-01 | Stop reason: SDUPTHER

## 2019-01-01 RX ORDER — POTASSIUM BICARBONATE 25 MEQ/1
25 TABLET, EFFERVESCENT ORAL DAILY
Status: DISCONTINUED | OUTPATIENT
Start: 2019-01-01 | End: 2019-01-01 | Stop reason: RX

## 2019-01-01 RX ORDER — DEXTROSE MONOHYDRATE 50 MG/ML
100 INJECTION, SOLUTION INTRAVENOUS PRN
Status: DISCONTINUED | OUTPATIENT
Start: 2019-01-01 | End: 2019-01-01 | Stop reason: HOSPADM

## 2019-01-01 RX ORDER — SODIUM CHLORIDE 0.9 % (FLUSH) 0.9 %
10 SYRINGE (ML) INJECTION PRN
Status: DISCONTINUED | OUTPATIENT
Start: 2019-01-01 | End: 2019-01-01 | Stop reason: HOSPADM

## 2019-01-01 RX ORDER — HYDROCODONE BITARTRATE AND ACETAMINOPHEN 5; 325 MG/1; MG/1
1 TABLET ORAL EVERY 6 HOURS PRN
Status: DISCONTINUED | OUTPATIENT
Start: 2019-01-01 | End: 2019-01-01 | Stop reason: SDUPTHER

## 2019-01-01 RX ORDER — FLUTICASONE PROPIONATE 50 MCG
1 SPRAY, SUSPENSION (ML) NASAL DAILY PRN
Status: DISCONTINUED | OUTPATIENT
Start: 2019-01-01 | End: 2019-01-01 | Stop reason: HOSPADM

## 2019-01-01 RX ORDER — AZITHROMYCIN 250 MG/1
TABLET, FILM COATED ORAL
Qty: 6 TABLET | Refills: 0 | Status: SHIPPED | OUTPATIENT
Start: 2019-01-01 | End: 2019-01-01 | Stop reason: HOSPADM

## 2019-01-01 RX ORDER — LANOLIN ALCOHOL/MO/W.PET/CERES
400 CREAM (GRAM) TOPICAL DAILY
Status: DISCONTINUED | OUTPATIENT
Start: 2019-01-01 | End: 2019-01-01 | Stop reason: HOSPADM

## 2019-01-01 RX ORDER — IPRATROPIUM BROMIDE AND ALBUTEROL SULFATE 2.5; .5 MG/3ML; MG/3ML
SOLUTION RESPIRATORY (INHALATION)
Status: COMPLETED
Start: 2019-01-01 | End: 2019-01-01

## 2019-01-01 RX ORDER — DEXTROSE MONOHYDRATE 25 G/50ML
12.5 INJECTION, SOLUTION INTRAVENOUS PRN
Status: DISCONTINUED | OUTPATIENT
Start: 2019-01-01 | End: 2019-01-01 | Stop reason: HOSPADM

## 2019-01-01 RX ORDER — ONDANSETRON 2 MG/ML
4 INJECTION INTRAMUSCULAR; INTRAVENOUS EVERY 6 HOURS PRN
Status: DISCONTINUED | OUTPATIENT
Start: 2019-01-01 | End: 2019-01-01 | Stop reason: HOSPADM

## 2019-01-01 RX ORDER — SODIUM CHLORIDE 0.9 % (FLUSH) 0.9 %
10 SYRINGE (ML) INJECTION EVERY 12 HOURS SCHEDULED
Status: DISCONTINUED | OUTPATIENT
Start: 2019-01-01 | End: 2019-01-01 | Stop reason: HOSPADM

## 2019-01-01 RX ORDER — LOPERAMIDE HYDROCHLORIDE 2 MG/1
2 CAPSULE ORAL 4 TIMES DAILY PRN
Status: DISCONTINUED | OUTPATIENT
Start: 2019-01-01 | End: 2019-01-01 | Stop reason: HOSPADM

## 2019-01-01 RX ORDER — PREDNISONE 20 MG/1
20 TABLET ORAL 2 TIMES DAILY
Qty: 14 TABLET | Refills: 0 | Status: SHIPPED | OUTPATIENT
Start: 2019-01-01 | End: 2019-01-01

## 2019-01-01 RX ORDER — ATORVASTATIN CALCIUM 40 MG/1
40 TABLET, FILM COATED ORAL DAILY
Status: DISCONTINUED | OUTPATIENT
Start: 2019-01-01 | End: 2019-01-01 | Stop reason: HOSPADM

## 2019-01-01 RX ORDER — FUROSEMIDE 10 MG/ML
40 INJECTION INTRAMUSCULAR; INTRAVENOUS 2 TIMES DAILY
Status: DISCONTINUED | OUTPATIENT
Start: 2019-01-01 | End: 2019-01-01

## 2019-01-01 RX ORDER — POTASSIUM CHLORIDE 7.45 MG/ML
10 INJECTION INTRAVENOUS
Status: COMPLETED | OUTPATIENT
Start: 2019-01-01 | End: 2019-01-01

## 2019-01-01 RX ORDER — ACETAMINOPHEN 325 MG/1
650 TABLET ORAL EVERY 4 HOURS PRN
Status: DISCONTINUED | OUTPATIENT
Start: 2019-01-01 | End: 2019-01-01 | Stop reason: HOSPADM

## 2019-01-01 RX ORDER — POTASSIUM CHLORIDE 7.45 MG/ML
10 INJECTION INTRAVENOUS PRN
Status: DISCONTINUED | OUTPATIENT
Start: 2019-01-01 | End: 2019-01-01 | Stop reason: HOSPADM

## 2019-01-01 RX ORDER — IPRATROPIUM BROMIDE AND ALBUTEROL SULFATE 2.5; .5 MG/3ML; MG/3ML
1 SOLUTION RESPIRATORY (INHALATION) 3 TIMES DAILY
Status: DISCONTINUED | OUTPATIENT
Start: 2019-01-01 | End: 2019-01-01 | Stop reason: HOSPADM

## 2019-01-01 RX ORDER — ONDANSETRON 4 MG/1
4 TABLET, FILM COATED ORAL EVERY 8 HOURS PRN
Qty: 60 TABLET | Refills: 2 | Status: SHIPPED | OUTPATIENT
Start: 2019-01-01

## 2019-01-01 RX ORDER — HYDROCODONE BITARTRATE AND ACETAMINOPHEN 5; 325 MG/1; MG/1
1 TABLET ORAL EVERY 6 HOURS PRN
Qty: 60 TABLET | Refills: 0 | Status: SHIPPED | OUTPATIENT
Start: 2019-01-01 | End: 2019-08-02

## 2019-01-01 RX ORDER — SODIUM CHLORIDE FOR INHALATION 0.9 %
VIAL, NEBULIZER (ML) INHALATION
Status: COMPLETED
Start: 2019-01-01 | End: 2019-01-01

## 2019-01-01 RX ORDER — IPRATROPIUM BROMIDE AND ALBUTEROL SULFATE 2.5; .5 MG/3ML; MG/3ML
1 SOLUTION RESPIRATORY (INHALATION) ONCE
Status: COMPLETED | OUTPATIENT
Start: 2019-01-01 | End: 2019-01-01

## 2019-01-01 RX ORDER — MAGNESIUM SULFATE IN WATER 40 MG/ML
2 INJECTION, SOLUTION INTRAVENOUS ONCE
Status: COMPLETED | OUTPATIENT
Start: 2019-01-01 | End: 2019-01-01

## 2019-01-01 RX ORDER — FUROSEMIDE 10 MG/ML
40 INJECTION INTRAMUSCULAR; INTRAVENOUS DAILY
Status: DISCONTINUED | OUTPATIENT
Start: 2019-01-01 | End: 2019-01-01 | Stop reason: SDUPTHER

## 2019-01-01 RX ORDER — ZOLPIDEM TARTRATE 5 MG/1
5 TABLET ORAL NIGHTLY PRN
Status: DISCONTINUED | OUTPATIENT
Start: 2019-01-01 | End: 2019-01-01 | Stop reason: HOSPADM

## 2019-01-01 RX ORDER — DILTIAZEM HYDROCHLORIDE 180 MG/1
180 CAPSULE, COATED, EXTENDED RELEASE ORAL DAILY
Status: DISCONTINUED | OUTPATIENT
Start: 2019-01-01 | End: 2019-01-01 | Stop reason: HOSPADM

## 2019-01-01 RX ORDER — ACETAMINOPHEN 500 MG
1000 TABLET ORAL EVERY 6 HOURS PRN
Status: DISCONTINUED | OUTPATIENT
Start: 2019-01-01 | End: 2019-01-01 | Stop reason: HOSPADM

## 2019-01-01 RX ORDER — METHYLPREDNISOLONE SODIUM SUCCINATE 125 MG/2ML
60 INJECTION, POWDER, LYOPHILIZED, FOR SOLUTION INTRAMUSCULAR; INTRAVENOUS EVERY 12 HOURS
Status: DISCONTINUED | OUTPATIENT
Start: 2019-01-01 | End: 2019-01-01

## 2019-01-01 RX ORDER — FOLIC ACID 0.8 MG
1 TABLET ORAL DAILY
Qty: 30 CAPSULE | Refills: 0 | Status: SHIPPED | OUTPATIENT
Start: 2019-01-01 | End: 2019-01-01

## 2019-01-01 RX ORDER — SODIUM CHLORIDE 9 MG/ML
INJECTION, SOLUTION INTRAVENOUS CONTINUOUS
Status: DISCONTINUED | OUTPATIENT
Start: 2019-01-01 | End: 2019-01-01

## 2019-01-01 RX ORDER — POTASSIUM BICARBONATE 25 MEQ/1
25 TABLET, EFFERVESCENT ORAL DAILY
Qty: 30 TABLET | Refills: 0 | Status: ON HOLD | OUTPATIENT
Start: 2019-01-01 | End: 2019-01-01 | Stop reason: HOSPADM

## 2019-01-01 RX ORDER — CIPROFLOXACIN 250 MG/1
250 TABLET, FILM COATED ORAL EVERY 12 HOURS SCHEDULED
Qty: 10 TABLET | Refills: 0 | Status: SHIPPED | OUTPATIENT
Start: 2019-01-01 | End: 2019-01-01

## 2019-01-01 RX ORDER — LEVALBUTEROL 1.25 MG/.5ML
1.25 SOLUTION, CONCENTRATE RESPIRATORY (INHALATION) EVERY 6 HOURS
Status: DISCONTINUED | OUTPATIENT
Start: 2019-01-01 | End: 2019-01-01

## 2019-01-01 RX ORDER — SODIUM CHLORIDE, SODIUM LACTATE, POTASSIUM CHLORIDE, CALCIUM CHLORIDE 600; 310; 30; 20 MG/100ML; MG/100ML; MG/100ML; MG/100ML
1000 INJECTION, SOLUTION INTRAVENOUS ONCE
Status: COMPLETED | OUTPATIENT
Start: 2019-01-01 | End: 2019-01-01

## 2019-01-01 RX ORDER — HYDROCODONE BITARTRATE AND ACETAMINOPHEN 5; 325 MG/1; MG/1
1 TABLET ORAL EVERY 6 HOURS PRN
Status: DISCONTINUED | OUTPATIENT
Start: 2019-01-01 | End: 2019-01-01 | Stop reason: HOSPADM

## 2019-01-01 RX ORDER — NICOTINE POLACRILEX 4 MG
15 LOZENGE BUCCAL PRN
Status: DISCONTINUED | OUTPATIENT
Start: 2019-01-01 | End: 2019-01-01 | Stop reason: HOSPADM

## 2019-01-01 RX ORDER — LOSARTAN POTASSIUM 100 MG/1
TABLET ORAL
Qty: 90 TABLET | Refills: 1 | Status: SHIPPED | OUTPATIENT
Start: 2019-01-01

## 2019-01-01 RX ORDER — FLUDEOXYGLUCOSE F 18 200 MCI/ML
12.15 INJECTION, SOLUTION INTRAVENOUS
Status: COMPLETED | OUTPATIENT
Start: 2019-01-01 | End: 2019-01-01

## 2019-01-01 RX ORDER — IPRATROPIUM BROMIDE AND ALBUTEROL SULFATE 2.5; .5 MG/3ML; MG/3ML
1 SOLUTION RESPIRATORY (INHALATION)
Status: DISCONTINUED | OUTPATIENT
Start: 2019-01-01 | End: 2019-01-01

## 2019-01-01 RX ORDER — METHYLPREDNISOLONE SODIUM SUCCINATE 125 MG/2ML
125 INJECTION, POWDER, LYOPHILIZED, FOR SOLUTION INTRAMUSCULAR; INTRAVENOUS ONCE
Status: COMPLETED | OUTPATIENT
Start: 2019-01-01 | End: 2019-01-01

## 2019-01-01 RX ORDER — ONDANSETRON 4 MG/1
4 TABLET, FILM COATED ORAL EVERY 8 HOURS PRN
Status: DISCONTINUED | OUTPATIENT
Start: 2019-01-01 | End: 2019-01-01 | Stop reason: HOSPADM

## 2019-01-01 RX ORDER — DIPHENHYDRAMINE HCL 25 MG
25 CAPSULE ORAL NIGHTLY PRN
Status: DISCONTINUED | OUTPATIENT
Start: 2019-01-01 | End: 2019-01-01 | Stop reason: HOSPADM

## 2019-01-01 RX ORDER — ALBUTEROL SULFATE 2.5 MG/3ML
2.5 SOLUTION RESPIRATORY (INHALATION) EVERY 4 HOURS PRN
Status: DISCONTINUED | OUTPATIENT
Start: 2019-01-01 | End: 2019-01-01 | Stop reason: HOSPADM

## 2019-01-01 RX ORDER — DILTIAZEM HYDROCHLORIDE 180 MG/1
180 CAPSULE, COATED, EXTENDED RELEASE ORAL DAILY
Qty: 90 CAPSULE | Refills: 1 | Status: SHIPPED | OUTPATIENT
Start: 2019-01-01

## 2019-01-01 RX ORDER — LOSARTAN POTASSIUM 50 MG/1
100 TABLET ORAL DAILY
Status: DISCONTINUED | OUTPATIENT
Start: 2019-01-01 | End: 2019-01-01 | Stop reason: HOSPADM

## 2019-01-01 RX ORDER — POTASSIUM CHLORIDE 20 MEQ/1
40 TABLET, EXTENDED RELEASE ORAL PRN
Status: DISCONTINUED | OUTPATIENT
Start: 2019-01-01 | End: 2019-01-01 | Stop reason: HOSPADM

## 2019-01-01 RX ORDER — PREDNISONE 10 MG/1
10 TABLET ORAL 2 TIMES DAILY
Qty: 10 TABLET | Refills: 0 | Status: SHIPPED | OUTPATIENT
Start: 2019-01-01 | End: 2019-01-01

## 2019-01-01 RX ORDER — SODIUM CHLORIDE FOR INHALATION 0.9 %
3 VIAL, NEBULIZER (ML) INHALATION EVERY 8 HOURS PRN
Status: DISCONTINUED | OUTPATIENT
Start: 2019-01-01 | End: 2019-01-01 | Stop reason: HOSPADM

## 2019-01-01 RX ORDER — SODIUM CHLORIDE 9 MG/ML
INJECTION, SOLUTION INTRAVENOUS CONTINUOUS
Status: DISCONTINUED | OUTPATIENT
Start: 2019-01-01 | End: 2019-01-01 | Stop reason: HOSPADM

## 2019-01-01 RX ORDER — LOPERAMIDE HYDROCHLORIDE 2 MG/1
2 CAPSULE ORAL 4 TIMES DAILY PRN
COMMUNITY
Start: 2019-01-01 | End: 2019-01-01

## 2019-01-01 RX ORDER — OMEPRAZOLE 10 MG/1
10 CAPSULE, DELAYED RELEASE ORAL DAILY
Qty: 90 CAPSULE | Refills: 1 | Status: SHIPPED | OUTPATIENT
Start: 2019-01-01

## 2019-01-01 RX ORDER — POLYETHYLENE GLYCOL 3350 17 G/17G
17 POWDER, FOR SOLUTION ORAL DAILY PRN
Status: DISCONTINUED | OUTPATIENT
Start: 2019-01-01 | End: 2019-01-01 | Stop reason: HOSPADM

## 2019-01-01 RX ORDER — PANTOPRAZOLE SODIUM 40 MG/1
40 TABLET, DELAYED RELEASE ORAL
Status: DISCONTINUED | OUTPATIENT
Start: 2019-01-01 | End: 2019-01-01 | Stop reason: HOSPADM

## 2019-01-01 RX ORDER — CAPECITABINE 500 MG/1
TABLET, FILM COATED ORAL
Qty: 84 TABLET | Refills: 3 | Status: ON HOLD | OUTPATIENT
Start: 2019-01-01 | End: 2019-01-01 | Stop reason: HOSPADM

## 2019-01-01 RX ORDER — FAMOTIDINE 20 MG/1
20 TABLET, FILM COATED ORAL DAILY
Status: DISCONTINUED | OUTPATIENT
Start: 2019-01-01 | End: 2019-01-01 | Stop reason: HOSPADM

## 2019-01-01 RX ORDER — ATORVASTATIN CALCIUM 40 MG/1
40 TABLET, FILM COATED ORAL DAILY
Status: DISCONTINUED | OUTPATIENT
Start: 2019-01-01 | End: 2019-01-01

## 2019-01-01 RX ORDER — DILTIAZEM HYDROCHLORIDE 90 MG/1
90 CAPSULE, EXTENDED RELEASE ORAL 2 TIMES DAILY
Status: DISCONTINUED | OUTPATIENT
Start: 2019-01-01 | End: 2019-01-01

## 2019-01-01 RX ORDER — DILTIAZEM HYDROCHLORIDE 180 MG/1
CAPSULE, EXTENDED RELEASE ORAL
Qty: 90 CAPSULE | Refills: 0 | Status: ON HOLD | OUTPATIENT
Start: 2019-01-01 | End: 2019-01-01 | Stop reason: HOSPADM

## 2019-01-01 RX ORDER — PROCHLORPERAZINE MALEATE 5 MG/1
10 TABLET ORAL EVERY 6 HOURS PRN
Status: DISCONTINUED | OUTPATIENT
Start: 2019-01-01 | End: 2019-01-01 | Stop reason: HOSPADM

## 2019-01-01 RX ORDER — MAGNESIUM SULFATE 1 G/100ML
1 INJECTION INTRAVENOUS PRN
Status: DISCONTINUED | OUTPATIENT
Start: 2019-01-01 | End: 2019-01-01 | Stop reason: HOSPADM

## 2019-01-01 RX ORDER — CIPROFLOXACIN 500 MG/1
500 TABLET, FILM COATED ORAL 2 TIMES DAILY
Qty: 14 TABLET | Refills: 0 | Status: ON HOLD | OUTPATIENT
Start: 2019-01-01 | End: 2019-01-01 | Stop reason: HOSPADM

## 2019-01-01 RX ORDER — LEVALBUTEROL 1.25 MG/.5ML
1.25 SOLUTION, CONCENTRATE RESPIRATORY (INHALATION) 3 TIMES DAILY
Status: DISCONTINUED | OUTPATIENT
Start: 2019-01-01 | End: 2019-01-01 | Stop reason: HOSPADM

## 2019-01-01 RX ORDER — PREDNISONE 20 MG/1
20 TABLET ORAL 2 TIMES DAILY
Status: DISCONTINUED | OUTPATIENT
Start: 2019-01-01 | End: 2019-01-01 | Stop reason: HOSPADM

## 2019-01-01 RX ORDER — FUROSEMIDE 10 MG/ML
40 INJECTION INTRAMUSCULAR; INTRAVENOUS DAILY
Status: DISCONTINUED | OUTPATIENT
Start: 2019-01-01 | End: 2019-01-01

## 2019-01-01 RX ORDER — POTASSIUM CHLORIDE 7.45 MG/ML
10 INJECTION INTRAVENOUS ONCE
Status: COMPLETED | OUTPATIENT
Start: 2019-01-01 | End: 2019-01-01

## 2019-01-01 RX ORDER — POTASSIUM CHLORIDE 20 MEQ/1
20 TABLET, EXTENDED RELEASE ORAL 2 TIMES DAILY WITH MEALS
Status: DISCONTINUED | OUTPATIENT
Start: 2019-01-01 | End: 2019-01-01 | Stop reason: HOSPADM

## 2019-01-01 RX ORDER — PROCHLORPERAZINE MALEATE 10 MG
10 TABLET ORAL EVERY 6 HOURS PRN
Qty: 30 TABLET | Refills: 3 | Status: SHIPPED | OUTPATIENT
Start: 2019-01-01

## 2019-01-01 RX ORDER — ATORVASTATIN CALCIUM 40 MG/1
TABLET, FILM COATED ORAL
Qty: 90 TABLET | Refills: 1 | Status: SHIPPED | OUTPATIENT
Start: 2019-01-01

## 2019-01-01 RX ORDER — CIPROFLOXACIN 250 MG/1
250 TABLET, FILM COATED ORAL EVERY 12 HOURS SCHEDULED
Status: DISCONTINUED | OUTPATIENT
Start: 2019-01-01 | End: 2019-01-01 | Stop reason: HOSPADM

## 2019-01-01 RX ORDER — POLYETHYLENE GLYCOL 3350 17 G/17G
17 POWDER, FOR SOLUTION ORAL DAILY PRN
Qty: 498 G | Refills: 0 | COMMUNITY
Start: 2019-01-01 | End: 2019-08-21

## 2019-01-01 RX ADMIN — NOREPINEPHRINE BITARTRATE 2 MCG/MIN: 1 INJECTION, SOLUTION, CONCENTRATE INTRAVENOUS at 15:32

## 2019-01-01 RX ADMIN — FUROSEMIDE 40 MG: 10 INJECTION, SOLUTION INTRAMUSCULAR; INTRAVENOUS at 08:32

## 2019-01-01 RX ADMIN — LOSARTAN POTASSIUM 100 MG: 50 TABLET ORAL at 08:30

## 2019-01-01 RX ADMIN — APIXABAN 2.5 MG: 2.5 TABLET, FILM COATED ORAL at 20:08

## 2019-01-01 RX ADMIN — DIPHENHYDRAMINE HYDROCHLORIDE 25 MG: 25 CAPSULE ORAL at 23:13

## 2019-01-01 RX ADMIN — ISODIUM CHLORIDE 3 ML: 0.03 SOLUTION RESPIRATORY (INHALATION) at 20:38

## 2019-01-01 RX ADMIN — INSULIN LISPRO 9 UNITS: 100 INJECTION, SOLUTION INTRAVENOUS; SUBCUTANEOUS at 08:55

## 2019-01-01 RX ADMIN — POTASSIUM CHLORIDE 40 MEQ: 20 TABLET, EXTENDED RELEASE ORAL at 10:30

## 2019-01-01 RX ADMIN — PANTOPRAZOLE SODIUM 40 MG: 40 TABLET, DELAYED RELEASE ORAL at 07:07

## 2019-01-01 RX ADMIN — INSULIN LISPRO 4 UNITS: 100 INJECTION, SOLUTION INTRAVENOUS; SUBCUTANEOUS at 17:20

## 2019-01-01 RX ADMIN — INSULIN LISPRO 2 UNITS: 100 INJECTION, SOLUTION INTRAVENOUS; SUBCUTANEOUS at 20:24

## 2019-01-01 RX ADMIN — ACETAMINOPHEN 650 MG: 325 TABLET, FILM COATED ORAL at 23:27

## 2019-01-01 RX ADMIN — ATORVASTATIN CALCIUM 40 MG: 40 TABLET, FILM COATED ORAL at 20:23

## 2019-01-01 RX ADMIN — DILTIAZEM HYDROCHLORIDE 180 MG: 180 CAPSULE, COATED, EXTENDED RELEASE ORAL at 18:17

## 2019-01-01 RX ADMIN — PREDNISONE 20 MG: 20 TABLET ORAL at 11:27

## 2019-01-01 RX ADMIN — FAMOTIDINE 20 MG: 20 TABLET ORAL at 08:29

## 2019-01-01 RX ADMIN — DILTIAZEM HYDROCHLORIDE 180 MG: 180 CAPSULE, COATED, EXTENDED RELEASE ORAL at 08:29

## 2019-01-01 RX ADMIN — APIXABAN 2.5 MG: 2.5 TABLET, FILM COATED ORAL at 23:07

## 2019-01-01 RX ADMIN — SODIUM CHLORIDE: 9 INJECTION, SOLUTION INTRAVENOUS at 23:28

## 2019-01-01 RX ADMIN — FAMOTIDINE 20 MG: 20 TABLET ORAL at 08:55

## 2019-01-01 RX ADMIN — IPRATROPIUM BROMIDE AND ALBUTEROL SULFATE 1 AMPULE: .5; 3 SOLUTION RESPIRATORY (INHALATION) at 11:05

## 2019-01-01 RX ADMIN — FAMOTIDINE 20 MG: 20 TABLET ORAL at 08:10

## 2019-01-01 RX ADMIN — DIPHENHYDRAMINE HYDROCHLORIDE 25 MG: 25 CAPSULE ORAL at 23:08

## 2019-01-01 RX ADMIN — ISODIUM CHLORIDE 3 ML: 0.03 SOLUTION RESPIRATORY (INHALATION) at 10:28

## 2019-01-01 RX ADMIN — ISODIUM CHLORIDE 3 ML: 0.03 SOLUTION RESPIRATORY (INHALATION) at 16:10

## 2019-01-01 RX ADMIN — APIXABAN 2.5 MG: 2.5 TABLET, FILM COATED ORAL at 20:23

## 2019-01-01 RX ADMIN — INSULIN LISPRO 2 UNITS: 100 INJECTION, SOLUTION INTRAVENOUS; SUBCUTANEOUS at 12:49

## 2019-01-01 RX ADMIN — ATORVASTATIN CALCIUM 40 MG: 40 TABLET, FILM COATED ORAL at 21:03

## 2019-01-01 RX ADMIN — PANTOPRAZOLE SODIUM 40 MG: 40 TABLET, DELAYED RELEASE ORAL at 07:03

## 2019-01-01 RX ADMIN — METHYLPREDNISOLONE SODIUM SUCCINATE 60 MG: 125 INJECTION, POWDER, FOR SOLUTION INTRAMUSCULAR; INTRAVENOUS at 03:48

## 2019-01-01 RX ADMIN — APIXABAN 2.5 MG: 2.5 TABLET, FILM COATED ORAL at 21:19

## 2019-01-01 RX ADMIN — ISODIUM CHLORIDE 3 ML: 0.03 SOLUTION RESPIRATORY (INHALATION) at 10:15

## 2019-01-01 RX ADMIN — INSULIN LISPRO 1 UNITS: 100 INJECTION, SOLUTION INTRAVENOUS; SUBCUTANEOUS at 21:06

## 2019-01-01 RX ADMIN — LOSARTAN POTASSIUM 100 MG: 50 TABLET ORAL at 17:57

## 2019-01-01 RX ADMIN — Medication 10 ML: at 08:37

## 2019-01-01 RX ADMIN — METFORMIN HYDROCHLORIDE 500 MG: 500 TABLET ORAL at 08:05

## 2019-01-01 RX ADMIN — FUROSEMIDE 40 MG: 10 INJECTION, SOLUTION INTRAMUSCULAR; INTRAVENOUS at 08:37

## 2019-01-01 RX ADMIN — SODIUM CHLORIDE: 9 INJECTION, SOLUTION INTRAVENOUS at 11:23

## 2019-01-01 RX ADMIN — LOSARTAN POTASSIUM 100 MG: 50 TABLET ORAL at 08:37

## 2019-01-01 RX ADMIN — POTASSIUM BICARBONATE 40 MEQ: 391 TABLET, EFFERVESCENT ORAL at 08:30

## 2019-01-01 RX ADMIN — CEFTRIAXONE 1 G: 1 INJECTION, POWDER, FOR SOLUTION INTRAMUSCULAR; INTRAVENOUS at 10:15

## 2019-01-01 RX ADMIN — ISODIUM CHLORIDE 3 ML: 0.03 SOLUTION RESPIRATORY (INHALATION) at 15:37

## 2019-01-01 RX ADMIN — ATORVASTATIN CALCIUM 40 MG: 40 TABLET, FILM COATED ORAL at 20:08

## 2019-01-01 RX ADMIN — PANTOPRAZOLE SODIUM 40 MG: 40 TABLET, DELAYED RELEASE ORAL at 08:36

## 2019-01-01 RX ADMIN — DILTIAZEM HYDROCHLORIDE 180 MG: 180 CAPSULE, COATED, EXTENDED RELEASE ORAL at 08:49

## 2019-01-01 RX ADMIN — ZOLPIDEM TARTRATE 5 MG: 5 TABLET ORAL at 23:11

## 2019-01-01 RX ADMIN — DILTIAZEM HYDROCHLORIDE 180 MG: 180 CAPSULE, COATED, EXTENDED RELEASE ORAL at 16:11

## 2019-01-01 RX ADMIN — ZOLPIDEM TARTRATE 5 MG: 5 TABLET ORAL at 22:41

## 2019-01-01 RX ADMIN — DILTIAZEM HYDROCHLORIDE 180 MG: 180 CAPSULE, COATED, EXTENDED RELEASE ORAL at 08:41

## 2019-01-01 RX ADMIN — Medication 10 ML: at 08:33

## 2019-01-01 RX ADMIN — INSULIN LISPRO 12 UNITS: 100 INJECTION, SOLUTION INTRAVENOUS; SUBCUTANEOUS at 11:25

## 2019-01-01 RX ADMIN — CEFTRIAXONE 1 G: 1 INJECTION, POWDER, FOR SOLUTION INTRAMUSCULAR; INTRAVENOUS at 21:19

## 2019-01-01 RX ADMIN — LOSARTAN POTASSIUM 100 MG: 50 TABLET ORAL at 08:05

## 2019-01-01 RX ADMIN — APIXABAN 2.5 MG: 2.5 TABLET, FILM COATED ORAL at 19:55

## 2019-01-01 RX ADMIN — DILTIAZEM HYDROCHLORIDE 180 MG: 180 CAPSULE, COATED, EXTENDED RELEASE ORAL at 08:31

## 2019-01-01 RX ADMIN — METHYLPREDNISOLONE SODIUM SUCCINATE 60 MG: 125 INJECTION, POWDER, FOR SOLUTION INTRAMUSCULAR; INTRAVENOUS at 03:16

## 2019-01-01 RX ADMIN — POTASSIUM BICARBONATE 20 MEQ: 782 TABLET, EFFERVESCENT ORAL at 08:45

## 2019-01-01 RX ADMIN — LEVALBUTEROL 1.25 MG: 1.25 SOLUTION, CONCENTRATE RESPIRATORY (INHALATION) at 16:10

## 2019-01-01 RX ADMIN — HYDROCODONE BITARTRATE AND ACETAMINOPHEN 1 TABLET: 5; 325 TABLET ORAL at 15:43

## 2019-01-01 RX ADMIN — IPRATROPIUM BROMIDE AND ALBUTEROL SULFATE 1 AMPULE: .5; 3 SOLUTION RESPIRATORY (INHALATION) at 20:11

## 2019-01-01 RX ADMIN — INSULIN LISPRO 2 UNITS: 100 INJECTION, SOLUTION INTRAVENOUS; SUBCUTANEOUS at 11:23

## 2019-01-01 RX ADMIN — DILTIAZEM HYDROCHLORIDE 180 MG: 180 CAPSULE, COATED, EXTENDED RELEASE ORAL at 08:36

## 2019-01-01 RX ADMIN — INSULIN LISPRO 1 UNITS: 100 INJECTION, SOLUTION INTRAVENOUS; SUBCUTANEOUS at 21:59

## 2019-01-01 RX ADMIN — METFORMIN HYDROCHLORIDE 500 MG: 500 TABLET ORAL at 08:30

## 2019-01-01 RX ADMIN — ATORVASTATIN CALCIUM 40 MG: 40 TABLET, FILM COATED ORAL at 21:53

## 2019-01-01 RX ADMIN — APIXABAN 2.5 MG: 2.5 TABLET, FILM COATED ORAL at 08:41

## 2019-01-01 RX ADMIN — FAMOTIDINE 20 MG: 20 TABLET ORAL at 08:41

## 2019-01-01 RX ADMIN — SODIUM CHLORIDE: 9 INJECTION, SOLUTION INTRAVENOUS at 15:17

## 2019-01-01 RX ADMIN — Medication 400 MG: at 08:06

## 2019-01-01 RX ADMIN — APIXABAN 2.5 MG: 2.5 TABLET, FILM COATED ORAL at 08:31

## 2019-01-01 RX ADMIN — SODIUM CHLORIDE: 9 INJECTION, SOLUTION INTRAVENOUS at 13:49

## 2019-01-01 RX ADMIN — CEFTRIAXONE 1 G: 1 INJECTION, POWDER, FOR SOLUTION INTRAMUSCULAR; INTRAVENOUS at 17:57

## 2019-01-01 RX ADMIN — ISODIUM CHLORIDE 3 ML: 0.03 SOLUTION RESPIRATORY (INHALATION) at 11:05

## 2019-01-01 RX ADMIN — HYDROCODONE BITARTRATE AND ACETAMINOPHEN 1 TABLET: 5; 325 TABLET ORAL at 08:36

## 2019-01-01 RX ADMIN — IPRATROPIUM BROMIDE AND ALBUTEROL SULFATE 1 AMPULE: .5; 3 SOLUTION RESPIRATORY (INHALATION) at 15:52

## 2019-01-01 RX ADMIN — ATORVASTATIN CALCIUM 40 MG: 40 TABLET, FILM COATED ORAL at 19:55

## 2019-01-01 RX ADMIN — Medication 10 ML: at 20:48

## 2019-01-01 RX ADMIN — METFORMIN HYDROCHLORIDE 500 MG: 500 TABLET ORAL at 08:49

## 2019-01-01 RX ADMIN — POTASSIUM CHLORIDE 20 MEQ: 20 TABLET, EXTENDED RELEASE ORAL at 12:30

## 2019-01-01 RX ADMIN — FAMOTIDINE 20 MG: 20 TABLET ORAL at 09:29

## 2019-01-01 RX ADMIN — FUROSEMIDE 40 MG: 10 INJECTION, SOLUTION INTRAMUSCULAR; INTRAVENOUS at 20:49

## 2019-01-01 RX ADMIN — SODIUM CHLORIDE, POTASSIUM CHLORIDE, SODIUM LACTATE AND CALCIUM CHLORIDE 1000 ML: 600; 310; 30; 20 INJECTION, SOLUTION INTRAVENOUS at 18:03

## 2019-01-01 RX ADMIN — LEVALBUTEROL 1.25 MG: 1.25 SOLUTION, CONCENTRATE RESPIRATORY (INHALATION) at 11:05

## 2019-01-01 RX ADMIN — INSULIN LISPRO 2 UNITS: 100 INJECTION, SOLUTION INTRAVENOUS; SUBCUTANEOUS at 16:56

## 2019-01-01 RX ADMIN — PANTOPRAZOLE SODIUM 40 MG: 40 TABLET, DELAYED RELEASE ORAL at 06:58

## 2019-01-01 RX ADMIN — LOSARTAN POTASSIUM 100 MG: 50 TABLET ORAL at 08:41

## 2019-01-01 RX ADMIN — APIXABAN 2.5 MG: 2.5 TABLET, FILM COATED ORAL at 21:53

## 2019-01-01 RX ADMIN — BENZOCAINE AND MENTHOL 1 LOZENGE: 15; 3.6 LOZENGE ORAL at 23:13

## 2019-01-01 RX ADMIN — CEFTRIAXONE 1 G: 1 INJECTION, POWDER, FOR SOLUTION INTRAMUSCULAR; INTRAVENOUS at 17:03

## 2019-01-01 RX ADMIN — METFORMIN HYDROCHLORIDE 500 MG: 500 TABLET ORAL at 17:18

## 2019-01-01 RX ADMIN — POTASSIUM BICARBONATE 20 MEQ: 782 TABLET, EFFERVESCENT ORAL at 08:05

## 2019-01-01 RX ADMIN — METFORMIN HYDROCHLORIDE 500 MG: 500 TABLET ORAL at 08:36

## 2019-01-01 RX ADMIN — DILTIAZEM HYDROCHLORIDE 180 MG: 180 CAPSULE, COATED, EXTENDED RELEASE ORAL at 08:05

## 2019-01-01 RX ADMIN — APIXABAN 2.5 MG: 2.5 TABLET, FILM COATED ORAL at 08:06

## 2019-01-01 RX ADMIN — CEFTRIAXONE 1 G: 1 INJECTION, POWDER, FOR SOLUTION INTRAMUSCULAR; INTRAVENOUS at 08:42

## 2019-01-01 RX ADMIN — INSULIN LISPRO 15 UNITS: 100 INJECTION, SOLUTION INTRAVENOUS; SUBCUTANEOUS at 11:28

## 2019-01-01 RX ADMIN — METHYLPREDNISOLONE SODIUM SUCCINATE 60 MG: 125 INJECTION, POWDER, FOR SOLUTION INTRAMUSCULAR; INTRAVENOUS at 14:52

## 2019-01-01 RX ADMIN — IPRATROPIUM BROMIDE AND ALBUTEROL SULFATE 1 AMPULE: .5; 3 SOLUTION RESPIRATORY (INHALATION) at 21:17

## 2019-01-01 RX ADMIN — INSULIN LISPRO 1 UNITS: 100 INJECTION, SOLUTION INTRAVENOUS; SUBCUTANEOUS at 20:47

## 2019-01-01 RX ADMIN — LEVALBUTEROL 1.25 MG: 1.25 SOLUTION, CONCENTRATE RESPIRATORY (INHALATION) at 20:38

## 2019-01-01 RX ADMIN — METHYLPREDNISOLONE SODIUM SUCCINATE 125 MG: 125 INJECTION, POWDER, FOR SOLUTION INTRAMUSCULAR; INTRAVENOUS at 15:31

## 2019-01-01 RX ADMIN — POTASSIUM CHLORIDE 10 MEQ: 7.46 INJECTION, SOLUTION INTRAVENOUS at 15:20

## 2019-01-01 RX ADMIN — Medication 10 ML: at 23:10

## 2019-01-01 RX ADMIN — IPRATROPIUM BROMIDE AND ALBUTEROL SULFATE 1 AMPULE: .5; 3 SOLUTION RESPIRATORY (INHALATION) at 15:47

## 2019-01-01 RX ADMIN — INSULIN LISPRO 6 UNITS: 100 INJECTION, SOLUTION INTRAVENOUS; SUBCUTANEOUS at 08:06

## 2019-01-01 RX ADMIN — INSULIN LISPRO 12 UNITS: 100 INJECTION, SOLUTION INTRAVENOUS; SUBCUTANEOUS at 16:56

## 2019-01-01 RX ADMIN — DILTIAZEM HYDROCHLORIDE 180 MG: 180 CAPSULE, COATED, EXTENDED RELEASE ORAL at 09:29

## 2019-01-01 RX ADMIN — INSULIN LISPRO 2 UNITS: 100 INJECTION, SOLUTION INTRAVENOUS; SUBCUTANEOUS at 08:43

## 2019-01-01 RX ADMIN — METFORMIN HYDROCHLORIDE 500 MG: 500 TABLET ORAL at 17:57

## 2019-01-01 RX ADMIN — Medication 10 ML: at 23:07

## 2019-01-01 RX ADMIN — METFORMIN HYDROCHLORIDE 500 MG: 500 TABLET ORAL at 16:58

## 2019-01-01 RX ADMIN — FUROSEMIDE 40 MG: 10 INJECTION, SOLUTION INTRAMUSCULAR; INTRAVENOUS at 17:57

## 2019-01-01 RX ADMIN — APIXABAN 2.5 MG: 2.5 TABLET, FILM COATED ORAL at 08:36

## 2019-01-01 RX ADMIN — BENZOCAINE AND MENTHOL 1 LOZENGE: 15; 3.6 LOZENGE ORAL at 20:27

## 2019-01-01 RX ADMIN — VANCOMYCIN HYDROCHLORIDE 750 MG: 750 INJECTION, POWDER, LYOPHILIZED, FOR SOLUTION INTRAVENOUS at 15:05

## 2019-01-01 RX ADMIN — IPRATROPIUM BROMIDE AND ALBUTEROL SULFATE 1 AMPULE: .5; 3 SOLUTION RESPIRATORY (INHALATION) at 07:32

## 2019-01-01 RX ADMIN — DILTIAZEM HYDROCHLORIDE 180 MG: 180 CAPSULE, COATED, EXTENDED RELEASE ORAL at 08:55

## 2019-01-01 RX ADMIN — LOSARTAN POTASSIUM 100 MG: 50 TABLET ORAL at 08:07

## 2019-01-01 RX ADMIN — ATORVASTATIN CALCIUM 40 MG: 40 TABLET, FILM COATED ORAL at 21:19

## 2019-01-01 RX ADMIN — CEFTRIAXONE 1 G: 1 INJECTION, POWDER, FOR SOLUTION INTRAMUSCULAR; INTRAVENOUS at 20:54

## 2019-01-01 RX ADMIN — POTASSIUM BICARBONATE 20 MEQ: 782 TABLET, EFFERVESCENT ORAL at 08:33

## 2019-01-01 RX ADMIN — APIXABAN 2.5 MG: 2.5 TABLET, FILM COATED ORAL at 21:03

## 2019-01-01 RX ADMIN — SODIUM CHLORIDE: 9 INJECTION, SOLUTION INTRAVENOUS at 02:53

## 2019-01-01 RX ADMIN — Medication 10 ML: at 22:01

## 2019-01-01 RX ADMIN — APIXABAN 2.5 MG: 2.5 TABLET, FILM COATED ORAL at 08:07

## 2019-01-01 RX ADMIN — LOSARTAN POTASSIUM 100 MG: 50 TABLET ORAL at 08:55

## 2019-01-01 RX ADMIN — ATORVASTATIN CALCIUM 40 MG: 40 TABLET, FILM COATED ORAL at 20:55

## 2019-01-01 RX ADMIN — IPRATROPIUM BROMIDE AND ALBUTEROL SULFATE 1 AMPULE: .5; 3 SOLUTION RESPIRATORY (INHALATION) at 15:27

## 2019-01-01 RX ADMIN — IPRATROPIUM BROMIDE AND ALBUTEROL SULFATE 1 AMPULE: .5; 3 SOLUTION RESPIRATORY (INHALATION) at 12:29

## 2019-01-01 RX ADMIN — PANTOPRAZOLE SODIUM 40 MG: 40 TABLET, DELAYED RELEASE ORAL at 07:09

## 2019-01-01 RX ADMIN — INSULIN LISPRO 9 UNITS: 100 INJECTION, SOLUTION INTRAVENOUS; SUBCUTANEOUS at 17:10

## 2019-01-01 RX ADMIN — POTASSIUM BICARBONATE 20 MEQ: 782 TABLET, EFFERVESCENT ORAL at 17:58

## 2019-01-01 RX ADMIN — CEFTRIAXONE 1 G: 1 INJECTION, POWDER, FOR SOLUTION INTRAMUSCULAR; INTRAVENOUS at 21:03

## 2019-01-01 RX ADMIN — LEVALBUTEROL 1.25 MG: 1.25 SOLUTION, CONCENTRATE RESPIRATORY (INHALATION) at 19:59

## 2019-01-01 RX ADMIN — DIPHENHYDRAMINE HYDROCHLORIDE 25 MG: 25 CAPSULE ORAL at 23:04

## 2019-01-01 RX ADMIN — ISODIUM CHLORIDE 3 ML: 0.03 SOLUTION RESPIRATORY (INHALATION) at 19:59

## 2019-01-01 RX ADMIN — POTASSIUM CHLORIDE 10 MEQ: 7.46 INJECTION, SOLUTION INTRAVENOUS at 17:15

## 2019-01-01 RX ADMIN — ISODIUM CHLORIDE 3 ML: 0.03 SOLUTION RESPIRATORY (INHALATION) at 19:03

## 2019-01-01 RX ADMIN — MAGNESIUM SULFATE HEPTAHYDRATE 2 G: 40 INJECTION, SOLUTION INTRAVENOUS at 16:11

## 2019-01-01 RX ADMIN — METHYLPREDNISOLONE SODIUM SUCCINATE 60 MG: 125 INJECTION, POWDER, FOR SOLUTION INTRAMUSCULAR; INTRAVENOUS at 03:18

## 2019-01-01 RX ADMIN — PIPERACILLIN SODIUM,TAZOBACTAM SODIUM 3.38 G: 3; .375 INJECTION, POWDER, FOR SOLUTION INTRAVENOUS at 14:32

## 2019-01-01 RX ADMIN — IPRATROPIUM BROMIDE AND ALBUTEROL SULFATE 1 AMPULE: .5; 3 SOLUTION RESPIRATORY (INHALATION) at 22:14

## 2019-01-01 RX ADMIN — METFORMIN HYDROCHLORIDE 500 MG: 500 TABLET ORAL at 16:19

## 2019-01-01 RX ADMIN — SODIUM CHLORIDE: 9 INJECTION, SOLUTION INTRAVENOUS at 23:07

## 2019-01-01 RX ADMIN — MAGNESIUM SULFATE HEPTAHYDRATE 2 G: 40 INJECTION, SOLUTION INTRAVENOUS at 14:05

## 2019-01-01 RX ADMIN — ZOLPIDEM TARTRATE 5 MG: 5 TABLET ORAL at 21:41

## 2019-01-01 RX ADMIN — INSULIN LISPRO 4 UNITS: 100 INJECTION, SOLUTION INTRAVENOUS; SUBCUTANEOUS at 17:21

## 2019-01-01 RX ADMIN — METFORMIN HYDROCHLORIDE 500 MG: 500 TABLET ORAL at 08:42

## 2019-01-01 RX ADMIN — PANTOPRAZOLE SODIUM 40 MG: 40 TABLET, DELAYED RELEASE ORAL at 08:37

## 2019-01-01 RX ADMIN — LEVALBUTEROL 1.25 MG: 1.25 SOLUTION, CONCENTRATE RESPIRATORY (INHALATION) at 10:28

## 2019-01-01 RX ADMIN — IPRATROPIUM BROMIDE AND ALBUTEROL SULFATE 1 AMPULE: .5; 3 SOLUTION RESPIRATORY (INHALATION) at 09:07

## 2019-01-01 RX ADMIN — LEVALBUTEROL 1.25 MG: 1.25 SOLUTION, CONCENTRATE RESPIRATORY (INHALATION) at 15:35

## 2019-01-01 RX ADMIN — INSULIN LISPRO 12 UNITS: 100 INJECTION, SOLUTION INTRAVENOUS; SUBCUTANEOUS at 12:03

## 2019-01-01 RX ADMIN — CIPROFLOXACIN HYDROCHLORIDE 250 MG: 250 TABLET, FILM COATED ORAL at 08:55

## 2019-01-01 RX ADMIN — BARIUM SULFATE 355 ML: 0.6 SUSPENSION ORAL at 14:05

## 2019-01-01 RX ADMIN — LEVALBUTEROL 1.25 MG: 1.25 SOLUTION, CONCENTRATE RESPIRATORY (INHALATION) at 19:02

## 2019-01-01 RX ADMIN — METHYLPREDNISOLONE SODIUM SUCCINATE 60 MG: 125 INJECTION, POWDER, FOR SOLUTION INTRAMUSCULAR; INTRAVENOUS at 16:19

## 2019-01-01 RX ADMIN — LOPERAMIDE HYDROCHLORIDE 2 MG: 2 CAPSULE ORAL at 22:41

## 2019-01-01 RX ADMIN — APIXABAN 2.5 MG: 2.5 TABLET, FILM COATED ORAL at 20:55

## 2019-01-01 RX ADMIN — APIXABAN 2.5 MG: 2.5 TABLET, FILM COATED ORAL at 09:29

## 2019-01-01 RX ADMIN — LEVALBUTEROL 1.25 MG: 1.25 SOLUTION, CONCENTRATE RESPIRATORY (INHALATION) at 10:15

## 2019-01-01 RX ADMIN — SODIUM CHLORIDE: 9 INJECTION, SOLUTION INTRAVENOUS at 21:03

## 2019-01-01 RX ADMIN — BENZOCAINE AND MENTHOL 1 LOZENGE: 15; 3.6 LOZENGE ORAL at 13:25

## 2019-01-01 RX ADMIN — FLUDEOXYGLUCOSE F 18 12.15 MILLICURIE: 200 INJECTION, SOLUTION INTRAVENOUS at 09:13

## 2019-01-01 RX ADMIN — SODIUM CHLORIDE: 9 INJECTION, SOLUTION INTRAVENOUS at 16:19

## 2019-01-01 RX ADMIN — POTASSIUM CHLORIDE 10 MEQ: 7.46 INJECTION, SOLUTION INTRAVENOUS at 16:19

## 2019-01-01 RX ADMIN — INSULIN LISPRO 3 UNITS: 100 INJECTION, SOLUTION INTRAVENOUS; SUBCUTANEOUS at 19:56

## 2019-01-01 RX ADMIN — METFORMIN HYDROCHLORIDE 500 MG: 500 TABLET ORAL at 17:04

## 2019-01-01 RX ADMIN — HYDROCODONE BITARTRATE AND ACETAMINOPHEN 1 TABLET: 5; 325 TABLET ORAL at 17:57

## 2019-01-01 RX ADMIN — METFORMIN HYDROCHLORIDE 500 MG: 500 TABLET ORAL at 08:55

## 2019-01-01 RX ADMIN — SODIUM CHLORIDE: 9 INJECTION, SOLUTION INTRAVENOUS at 13:42

## 2019-01-01 RX ADMIN — APIXABAN 2.5 MG: 2.5 TABLET, FILM COATED ORAL at 08:55

## 2019-01-01 RX ADMIN — POTASSIUM CHLORIDE 10 MEQ: 7.46 INJECTION, SOLUTION INTRAVENOUS at 18:28

## 2019-01-01 RX ADMIN — LOPERAMIDE HYDROCHLORIDE 2 MG: 2 CAPSULE ORAL at 16:58

## 2019-01-01 RX ADMIN — APIXABAN 2.5 MG: 2.5 TABLET, FILM COATED ORAL at 08:29

## 2019-01-01 ASSESSMENT — ENCOUNTER SYMPTOMS
DIARRHEA: 0
NAUSEA: 0
DIARRHEA: 0
SORE THROAT: 0
CONSTIPATION: 0
DIARRHEA: 0
ABDOMINAL PAIN: 0
CONSTIPATION: 0
BACK PAIN: 0
CONSTIPATION: 0
COUGH: 0
COUGH: 0
WHEEZING: 0
EYE DISCHARGE: 0
CONSTIPATION: 0
COUGH: 1
TROUBLE SWALLOWING: 1
ORTHOPNEA: 0
CHEST TIGHTNESS: 0
SORE THROAT: 0
WHEEZING: 0
BLOOD IN STOOL: 0
NAUSEA: 0
NAUSEA: 0
VOMITING: 0
TROUBLE SWALLOWING: 0
VOICE CHANGE: 0
VOMITING: 0
CHEST TIGHTNESS: 0
SHORTNESS OF BREATH: 1
ABDOMINAL PAIN: 0
EYE REDNESS: 0
ABDOMINAL PAIN: 0
ABDOMINAL PAIN: 0
ABDOMINAL DISTENTION: 0
SHORTNESS OF BREATH: 1
BACK PAIN: 0
SPUTUM PRODUCTION: 1
EYE REDNESS: 0
BACK PAIN: 0
VOMITING: 0
BLOOD IN STOOL: 0
BLOOD IN STOOL: 0
VOMITING: 0
COUGH: 1
WHEEZING: 0
EYE DISCHARGE: 0
EYE REDNESS: 0
RHINORRHEA: 0
SHORTNESS OF BREATH: 0
FACIAL SWELLING: 0
DIARRHEA: 0
COUGH: 1
EYE DISCHARGE: 0
WHEEZING: 0
ABDOMINAL PAIN: 0
SHORTNESS OF BREATH: 1
NAUSEA: 0
SHORTNESS OF BREATH: 1
CHEST TIGHTNESS: 0
RHINORRHEA: 0
PHOTOPHOBIA: 0
DIARRHEA: 0
CHEST TIGHTNESS: 0
SORE THROAT: 0
NAUSEA: 0
CHOKING: 1
COLOR CHANGE: 0
SORE THROAT: 0
BACK PAIN: 0
BLOOD IN STOOL: 0

## 2019-01-01 ASSESSMENT — PATIENT HEALTH QUESTIONNAIRE - PHQ9
2. FEELING DOWN, DEPRESSED OR HOPELESS: 0
SUM OF ALL RESPONSES TO PHQ QUESTIONS 1-9: 0
1. LITTLE INTEREST OR PLEASURE IN DOING THINGS: 0
SUM OF ALL RESPONSES TO PHQ9 QUESTIONS 1 & 2: 0
SUM OF ALL RESPONSES TO PHQ QUESTIONS 1-9: 0

## 2019-01-01 ASSESSMENT — PAIN SCALES - GENERAL
PAINLEVEL_OUTOF10: 0
PAINLEVEL_OUTOF10: 2
PAINLEVEL_OUTOF10: 0
PAINLEVEL_OUTOF10: 4
PAINLEVEL_OUTOF10: 0
PAINLEVEL_OUTOF10: 2
PAINLEVEL_OUTOF10: 0
PAINLEVEL_OUTOF10: 5
PAINLEVEL_OUTOF10: 0
PAINLEVEL_OUTOF10: 5
PAINLEVEL_OUTOF10: 0
PAINLEVEL_OUTOF10: 0
PAINLEVEL_OUTOF10: 5
PAINLEVEL_OUTOF10: 0

## 2019-01-01 ASSESSMENT — PAIN DESCRIPTION - PAIN TYPE
TYPE: ACUTE PAIN
TYPE: ACUTE PAIN

## 2019-01-01 ASSESSMENT — PAIN DESCRIPTION - LOCATION
LOCATION: ABDOMEN
LOCATION: ABDOMEN

## 2019-01-01 ASSESSMENT — PAIN DESCRIPTION - DESCRIPTORS
DESCRIPTORS: ACHING
DESCRIPTORS: ACHING

## 2019-01-01 ASSESSMENT — PAIN - FUNCTIONAL ASSESSMENT
PAIN_FUNCTIONAL_ASSESSMENT: 0-10
PAIN_FUNCTIONAL_ASSESSMENT: 0-10

## 2019-01-01 ASSESSMENT — PAIN DESCRIPTION - ORIENTATION
ORIENTATION: MID;UPPER
ORIENTATION: UPPER;MID

## 2019-01-07 PROBLEM — R09.02 HYPOXIA: Status: ACTIVE | Noted: 2019-01-01

## 2019-01-07 PROBLEM — J40 BRONCHITIS: Status: ACTIVE | Noted: 2019-01-01

## 2019-01-07 PROBLEM — J44.1 COPD EXACERBATION (HCC): Status: ACTIVE | Noted: 2019-01-01

## 2019-01-08 PROBLEM — J38.01 VOCAL CORD PARALYSIS, UNILATERAL COMPLETE: Status: ACTIVE | Noted: 2019-01-01

## 2019-01-08 PROBLEM — N17.9 ACUTE RENAL FAILURE SUPERIMPOSED ON STAGE 2 CHRONIC KIDNEY DISEASE (HCC): Status: ACTIVE | Noted: 2019-01-01

## 2019-01-08 PROBLEM — N18.2 ACUTE RENAL FAILURE SUPERIMPOSED ON STAGE 2 CHRONIC KIDNEY DISEASE (HCC): Status: ACTIVE | Noted: 2019-01-01

## 2019-01-09 PROBLEM — N30.00 ACUTE CYSTITIS: Status: ACTIVE | Noted: 2019-01-01

## 2019-01-29 NOTE — PROGRESS NOTES
[facility]  Cancel Note/ No Show Note    Date: 2019  Patient Name: Alfredo Elena        MRN: [id number]    Account #: [de-identified]  : 1934  (80 y.o.)  Gender: female                REASON FOR MISSED TREATMENT:    []Cancelled due to illness. [] Therapist Canceled Appointment  []Cancelled due to other appointment   []No Show / No call. Pt called with next scheduled appointment. [] Cancelled due to transportation conflict  [x]Cancelled due to weather. Pt would like to reschedule  []Frequency of order changed  []Patient on hold due to:     []OTHER:        Electronically signed by:    Daphne HARMON-SLP            Date:2019

## 2019-04-05 NOTE — PROGRESS NOTES
15x each following clinician model to improve pharyngeal functioning. Patient completed oropharyngeal exercises 15x each independently. Included tongue base retraction exercises, Courtney, Mendelsohn, Effortful Swallow, and supraglottic Swallow. [x]Met  []Partially met  []Not met   Goal 5: Patient will utilize compensatory strategies during a meal/snack with minimal cues in 80% of opportunities. Immediate cough with thin liquids. Patient did not utilize complete head turn . Continued education of full head turn vs head \"shrug. \"    Trialed nectar-thickened liquids with no overt s/sx of aspiration. Patient educated on continuing to monitor her swallowing with liquids. She states that she would not want to go back to nectar-thickened liquids as she feels that she does not get enough to drink \"as it is. \" She also states that \"this doesn't usually happen at home\" and that she does not want to use nectar-thickened liquids. Risks of aspiration reviewed with patient. She expressed understanding. []Met  [x]Partially met  []Not met     LONG TERM GOALS/ TREATMENT SESSION:  Goal 1: Patient will improve vocal quality for communication for daily needs. Goal Progressing- See STGs []Met  [x]Partially met  []Not met   Goal 2: Patient will independently utilize compensatory strategies during a meal/snack across 2 consecutive sessions.   Goal Progressing- See STGs   []Met  [x]Partially met  []Not met       EDUCATION/HOME EXERCISE PROGRAM (HEP)  New Education/HEP provided to patient/family/caregiver:    []Yes:     [x]No (Continued review of prior education)   If yes Education Provided:     Method of Education:     [x]Discussion     []Demonstration    [] Written     []Other  Evaluation of Patients Response to Education:         [x]Patient and or caregiver verbalized understanding  []Patient and or Caregiver Demonstrated without assistance   []Patient and or Caregiver Demonstrated with assistance  []Needs additional instruction to demonstrate understanding of education    ASSESSMENT  Patient tolerated todays treatment session:    [x] Good   []  Fair   []  Poor  Limitations/difficulties with treatment session due to:   []Pain     []Fatigue     []Other medical complications     []Other    Comments:    PLAN  [x]Continue with current plan of care  [][facility]  []IHold per patient request  [] Change Treatment plan:  [] Insurance hold  __ Other     TIME   Time Treatment session was INITIATED 1415   Time Treatment session was STOPPED 1500    45     Charges: 1  Electronically signed by:    Ilir Huynh M.S. CF-SLP              Date:4/5/2019

## 2019-04-12 NOTE — PROGRESS NOTES
given minimal verbal cues. []Met  []Partially met  []Not met   Goal 3: Patient will sustain phonation for 5 seconds given minimal verbal cues. Patient sutained phonation for an average of 1.3 seconds. []Met  [x]Partially met  []Not met   Goal 4: Patient will complete oropharyngeal exercises 15x each following clinician model to improve pharyngeal functioning. Patient completed oropharyngeal exercises 15x each independently. Included tongue base retraction exercises, Courtney, Mendelsohn, Effortful Swallow, and supraglottic Swallow. []Met  [x]Partially met  []Not met   Goal 5: Patient will utilize compensatory strategies during a meal/snack with minimal cues in 80% of opportunities. Immediate Cough on thin liquids 2/10 trials delayed cough with thin liquids on 2/10 trials. Patient continues to state that she can drink liquids \"just fine\" at home without coughing. Educated on continued      []Met  [x]Partially met  []Not met     LONG TERM GOALS/ TREATMENT SESSION:  Goal 1: Patient will improve vocal quality for communication for daily needs. Goal Progressing- See STGs []Met  [x]Partially met  []Not met   Goal 2: Patient will independently utilize compensatory strategies during a meal/snack across 2 consecutive sessions.   Goal Progressing- See STGs   []Met  [x]Partially met  []Not met       EDUCATION/HOME EXERCISE PROGRAM (HEP)  New Education/HEP provided to patient/family/caregiver:    []Yes:     [x]No (Continued review of prior education)   If yes Education Provided:    Method of Education:     [x]Discussion     []Demonstration    [] Written     []Other  Evaluation of Patients Response to Education:         []Patient and or caregiver verbalized understanding  []Patient and or Caregiver Demonstrated without assistance   []Patient and or Caregiver Demonstrated with assistance  []Needs additional instruction to demonstrate understanding of education    ASSESSMENT  Patient tolerated todays treatment session:    [x] Good   []  Fair   []  Poor  Limitations/difficulties with treatment session due to:   []Pain     []Fatigue     []Other medical complications     []Other    Comments:    PLAN  [x]Continue with current plan of care  [][facility]  []IHold per patient request  [] Change Treatment plan:  [] Insurance hold  __ Other     TIME   Time Treatment session was INITIATED 1415   Time Treatment session was STOPPED 1500    45     Charges: 1  Electronically signed by:    Rosita HARMON-SLP              Date:4/12/2019

## 2019-04-16 NOTE — PROGRESS NOTES
Phone: [address]    Fax: [phone]                                 Outpatient Speech Therapy                               DAILY TREATMENT NOTE    Date: 4/16/2019  Patients Name:  Melany Ramirez  YOB: 1934 (80 y.o.)  Gender:  female  MRN:  [id number]  [facility] #: [id number]  Referring physician:Facundo [address] [place] [address] Insurance Information: Medicare       Total # of Visits to Date: 15           Current Authorization        PAIN  [x]No     []Yes      Pain Rating (0-10 pain scale): 0  Location:  N/A  Pain Description: NA    SUBJECTIVE  Patient presents to clinic with self. SHORT TERM GOALS/ TREATMENT SESSION:  Subjective report:          Patient pleasant and cooperative throughout therapy. Increased coughing (not necessarily during drinks according to patient)    Vital Stimulation performed at 8.5 miliAmps bilaterally. ENT appointment scheduled May 16th. Will discuss increasing sessions to 2x a week until ENT appointment. Last session ENT notes provided. No indication whether or not the left vocal fold is paralyzed in adducted or abducted. Goal 1: Patient will complete vocal adduction and breath support exercises with minimal verbal cues. Completed with minimal verbal cues. Patient cued to take breaths between vocal exercises. Continued decreased breath support. Difficulty with gliding from high pitch to low pitch this date. [x]Met  []Partially met  []Not met   Goal 2: Patient will utilize compensatory strategies (pacing, breath support, etc) in conversation with 80% accuracy given minimal verbal cues. Utilized with 80% accuracy given minimal verbal cues. [x]Met  []Partially met  []Not met   Goal 3: Patient will sustain phonation for 5 seconds given minimal verbal cues. Sustaining for 1.3 seconds average.     []Met  [x]Partially met  []Not met   Goal 4: Patient will complete oropharyngeal exercises 15x each following clinician model to improve pharyngeal functioning. Patient completed oropharyngeal exercises 15x each independently. Included tongue base retraction exercises, Courtney, Mendelsohn, Effortful Swallow, and supraglottic Swallow.  [x]Met  []Partially met  []Not met   Goal 5: Patient will utilize compensatory strategies during a meal/snack with minimal cues in 80% of opportunities. DNT this date. []Met  [x]Partially met  []Not met     LONG TERM GOALS/ TREATMENT SESSION:  Goal 1: Patient will improve vocal quality for communication for daily needs. Goal Progressing- See STGs []Met  [x]Partially met  []Not met   Goal 2: Patient will independently utilize compensatory strategies during a meal/snack across 2 consecutive sessions.   Goal Progressing- See STGs []Met  []Partially met  []Not met       EDUCATION/HOME EXERCISE PROGRAM (HEP)  New Education/HEP provided to patient/family/caregiver:    []Yes:     [x]No (Continued review of prior education)   If yes Education Provided:     Method of Education:     [x]Discussion     []Demonstration    [] Written     []Other  Evaluation of Patients Response to Education:         [x]Patient and or caregiver verbalized understanding  []Patient and or Caregiver Demonstrated without assistance   []Patient and or Caregiver Demonstrated with assistance  []Needs additional instruction to demonstrate understanding of education    ASSESSMENT  Patient tolerated todays treatment session:    [] Good   [x]  Fair   []  Poor  Limitations/difficulties with treatment session due to:   []Pain     []Fatigue     []Other medical complications     [x]Other    Comments:Decreased breath support  PLAN  [x]Continue with current plan of care  [][facility]  []IHold per patient request  [] Change Treatment plan:  [] Insurance hold  __ Other     TIME   Time Treatment session was INITIATED 1100   Time Treatment session was STOPPED 1130    30     Charges: 1  Electronically signed by:    Batool Sanchez M. S. -[facility]              Date:4/16/2019

## 2019-04-16 NOTE — TELEPHONE ENCOUNTER
Last OV 10/25/18 for check up  No future apt scheduled    Requesting refill on metformin thru sure script

## 2019-04-24 NOTE — PROGRESS NOTES
Chief Complaint   Patient presents with    Follow-up     breast cancer      Pertinent clinical problems/treatment:    1. Right-sided breast cancer, T2 N1 M0, ER negative/CT negative/HER-2 negative  2. Imaging studies including PET scan, no distant metastatic disease  3. Neoadjuvant chemo, weekly carbotaxol, started  June 23, 2015. C2 on 7/14, C3 8/4/15, then stopped   4. S/p lumpectomy and node dissection:pT2N2, multiple positive lymph nodes, September 15, 2015  5. Adjuvant radiation       Summary of the case    Ms. Jordan Garcia is a very pleasant 80 y.o. female . She was doing fine until earlier this year, when she felt a mass int he upper outer quadrant of her right breast. The lump did not change after a few months, so she consulted with her doctor. Mammogram was done on 5/11/15 and showed large mass at the 10:00 position with spiculated margins. Ultrasound shortly followed and showed 3.4 cm mass at the 10:00 position with irregular margins. At least one large axillary node was appreciated. The patient did not have mammograms for many years before this. Biopsy was done and showed invasive ductal carcinoma, measuring at least 1.6 cm in the bx specimen. The tumor was ER/CT and HER 2 negative (triple negative ) . Clinical stage is T2N1M0. The patient underwent neoadjuvant chemotherapy, received 3 cycles, after that she underwent lumpectomy and node dissection. Pathologically the tumor was staged at T2 N2 M0. About 9 axillary lymph nodes were appreciated. After surgery, she had problem with tissue healing. She was managed conservatively. The wound slowly healed by secondary intention. After that we decided to proceed with radiation. And she was observed after that. Workup suggested the presence of lung lesions that were in the range of 4-7 mm and those were followed conservatively  In early 2019, She developed aspiration pneumonia and workup showed vocal cord paralysis.   CT scan of the vitamins-minerals, and the following Facility-Administered Medications: sodium chloride (pf) and heparin flush. SOCIAL HISTORY:  reports that she quit smoking about 61 years ago. She has a 1.00 pack-year smoking history. She has never used smokeless tobacco. She reports that she does not drink alcohol or use drugs. REVIEW OF SYSTEMS:     General: No fever no chills, fatigue is about the same, new hoarseness of voice  ENT: No double vision, visual difficulty due to macular degeneration. no tinnitus or hearing problem,   hoarseness of voice as discussed  Respiratory: No chest pain, no shortness of breath, no cough or hemoptysis. S/p right lumpectomy and node dissection on the right side, new pain and lesion in the left side as discussed  Cardiovascular: Denies chest pain, PND or orthopnea. + L E swelling , no palpitations. Gastrointestinal:    Mild  Nausea, no  vomiting, abdominal pain, diarrhea or constipation. Genitourinary: Denies dysuria, hematuria, frequency, urgency or incontinence. Neurological: Denies headaches, decreased LOC, no sensory or motor focal deficits. Musculoskeletal:  No arthralgia , + back pain. Hematologic: no bleeding , no adenopathy. PHYSICAL EXAM:  The patient is not in acute distress. Vital signs: Blood pressure (!) 148/70, pulse 109, temperature 99 °F (37.2 °C), temperature source Temporal, resp. rate 20, height 5' 2\" (1.575 m), weight 158 lb 9.6 oz (71.9 kg), not currently breastfeeding. HEENT:  Eyes are normal. Ears, nose and throat are normal.  Neck: Supple. No lymph node enlargement. No thyroid enlargement. Trachea is centrally located. Chest:  Clear to auscultation. Decreased air entry in the right base with dullness. Breast: s/p right lumpectomy , Post radiation changes have improved significantly. No other masses or adenopathy are appreciated. .  Left breast is free of any masses or adenopathy or infection. Rt side: No palpable lesion appreciated. Possibly fullness in Rt axilla. She has a random soreness in that area. Heart: Irregular with frequent missed beats . Abdomen: Soft, nontender. No hepatosplenomegaly. No masses. Extremities:  + edema. Lymph Nodes:  No cervical, axillary or inguinal lymph node enlargement. Neurologic:  Conscious and oriented. No focal neurological deficits. Psychosocial: No depression, anxiety or stress. Skin: No rashes, bruises or ecchymoses. Review of Diagnostic data:    Ct scan  Impression   IMPRESSION:    1. New postoperative findings of the right breast and axilla   2. Tiny residual right axillary lymph node   3. Large hiatal hernia   4. Benign right renal cysts   5. Stable compression fracture T9 with methylmethacrylate augmentation     CT of the chest 5/4/2016  1. Slight decrease in size of the residual right axillary lymph node   2. Progressive but benign appearing pleural parenchymal thickening in both lung apices         Lab Results   Component Value Date    WBC 8.6 01/10/2019    HGB 9.5 (L) 01/10/2019    HCT 30.8 (L) 01/10/2019    MCV 95.4 01/10/2019     01/10/2019       Chemistry        Component Value Date/Time     01/15/2019 1013    K 4.0 01/15/2019 1013     01/15/2019 1013    CO2 24 01/15/2019 1013    BUN 30 (H) 01/15/2019 1013    CREATININE 1.49 (H) 01/15/2019 1013        Component Value Date/Time    CALCIUM 10.2 01/15/2019 1013    ALKPHOS 67 01/10/2019 0550    AST 18 01/10/2019 0550    ALT 17 01/10/2019 0550    BILITOT <0.10 (L) 01/10/2019 0550        Ct scan  1. New 5 mm nodule along the right major fissure which is likely intrafissural lymph node. A 3 month follow-up CT is recommended. 2. Otherwise stable examination.         Mammogram     FINDINGS: Compared to 11/30/2017 and 11/28/2016. Nadia Person left breast is heterogeneously dense.  Stable benign scattered and vascular calcifications in the breast. No mammographic or sonographic abnormality at the site of tender palpation.   Ultrasound  FINDINGS: At the site of tenderness and palpation, no focal lesion is identified. There is normal fibroglandular tissue and underlying muscle. No mass lesion, fluid collection or lymphadenopathy. Chest CT scan September 27, 2018: Impression   1. Interval enlargement of the pulmonary nodule in the superior aspect of the   right lower lobe.  In the setting of known malignancy, this is concerning for   metastatic disease.  It is too small to be evaluated with PET-CT.  Tissue   sampling versus short interval follow-up in 3 months should be considered. 2. A 1.6 cm left thyroid lobe nodule is present.  Recommend thyroid US. The findings were sent to the Radiology Results [address] at 10:59   am on 9/27/2018to be communicated to a licensed caregiver.         Ct chest 1/19    Impression   1. New 4 mm solid noncalcified pulmonary nodule right upper lobe.  Additional   stable nodules are identified involving the right lung, largest measuring 7   mm within the right lower lobe.  Findings suggest progression of disease. 2. New trace right-sided pleural effusion.  Metastatic effusion cannot be   excluded. IMPRESSION:     1. Locally advanced triple negative right sided breast cancer, clinical stage is T2N1/N2M0. Candice Savage PET scan showed no evidence of distant metastatic disease but multiple lymph nodes. She is undergoing neoadjuvant chemotherapy in the form of weekly carbotaxol. He received 3 cycles of chemotherapy with very good clinical response. Ultrasound showing some shrinkage but does not reflect the true clinical response that I do appreciate by clinical exam.  Patient is getting more fatigue from chemotherapy. We feel that moving to surgery would be the right answer for the patient. Surgery was done around 16 September 2015 and showed 2.2 cm residual cancer as well as multiple lymph nodes, around 9 that are positive  2. Underwent adjuvant radiation.    3. Advanced age and multiple comorbidities   4. Atrial fibrillation, treated with anticoagulation and rate control   5. Axillary Lymphadenopathy. 6. Undergoing surveillance, Repeat CT scan will be ordered  7. Small fullness in Rt axilla. The lesion is questionable. 8. Lung nodule, currently 7 mm  9. Recent vocal cord boluses           PLAN:   I'm very concerned about her progressive symptoms and my examination. I think she has a larger pleural effusion by my exam versus consolidation. Based on constellation of her symptoms, progressive fatigue, worsening shortness of breath, vocal cord paralysis and lung nodule as well as pleural effusion, highly suspicious of recurrent breast cancer. CT scans have not been helpful so far. They were all done without contrast because of her kidney dysfunction. But I think a PET/CT scan might be much more helpful in this situation. This would be done as soon as possible. If we are able to diagnose recurrent breast cancer or if there is any enhancing lesions in the lung or pleural effusion, this would be psychologically drained or biopsied to confirm recurrent disease. The patient is asking about options at this situation and we discussed briefly some basic options such as palliative chemotherapy or even CDK inhibitor's. The patient will come back after the PET scan for discussion.   About 40 minutes were spent                           BIA [facility] MD [place]  Hematologist/Medical Oncologist  Cell: [phone]

## 2019-04-25 NOTE — PROGRESS NOTES
dysfunction seen.  History of Holter monitoring 06/30/2016    Frequent PAC's with multiple short runs of what appears to be atrial fib. including with RVR at times as fast as 160 bpm,    Hyperlipidemia     Hypertension     Macular degeneration     PONV (postoperative nausea and vomiting)     Stress fracture of lumbar vertebra     from coughing; dx last week      Reviewed all health maintenance requirements and ordered appropriate tests  Health Maintenance Due   Topic Date Due    DTaP/Tdap/Td vaccine (1 - Tdap) 09/22/1953    Shingles Vaccine (1 of 2) 09/22/1984    DEXA (modify frequency per FRAX score)  09/22/1999       Past Surgical History:     Past Surgical History:   Procedure Laterality Date    BACK SURGERY  09/2013    cement fusion    BLADDER SUSPENSION      BREAST LUMPECTOMY Right 09/16/2015    Dr Lynette Gragner, Lumpectomy   [address]      x1    EYE SURGERY      OTHER SURGICAL HISTORY Right 10/21/15    I&D Rt. breast abcess    STOMACH SURGERY  07/2013    In [facility] ulcer[facility]perforation[facility]repair    TUNNELED VENOUS PORT PLACEMENT Left 06/17/2015    UPPER GASTROINTESTINAL ENDOSCOPY  2013    UPPER GASTROINTESTINAL ENDOSCOPY N/A 11/27/2018    -bx(normal)antral/duodenal deformity,hiatal hernia        Medications:       Prior to Admission medications    Medication Sig Start Date End Date Taking?  Authorizing Provider   atorvastatin (LIPITOR) 40 MG tablet TAKE 1 TABLET BY MOUTH DAILY 4/25/19  Yes Penny Paiz MD   metFORMIN (GLUCOPHAGE) 500 MG tablet TAKE 1 TABLET BY MOUTH TWICE A DAY WITH FOOD 4/25/19  Yes Penny Paiz MD   diltiazem (CARDIZEM CD) 180 MG extended release capsule Take 1 capsule by mouth daily 4/25/19  Yes Penny Paiz MD   omeprazole (PRILOSEC) 10 MG delayed release capsule Take 1 capsule by mouth daily 4/25/19  Yes Penny Paiz MD   apixaban Ketty Maha) 2.5 MG TABS tablet Take 1 tablet by mouth 2 times daily 11/13/18  Yes Ervin Frias Musculoskeletal: Negative for joint swelling and neck pain. Skin: Negative for rash. Neurological: Negative for dizziness, tremors, light-headedness and headaches. Psychiatric/Behavioral: Negative for sleep disturbance. Physical Exam:     Physical Exam   Constitutional: She is oriented to person, place, and time. She appears well-developed and well-nourished. HENT:   Head: Normocephalic and atraumatic. Right Ear: External ear normal.   Left Ear: External ear normal.   Nose: Mucosal edema present. No rhinorrhea. Mouth/Throat: No oropharyngeal exudate, posterior oropharyngeal edema or posterior oropharyngeal erythema. Mild post nasal drainage   Eyes: Pupils are equal, round, and reactive to light. Conjunctivae are normal. Right eye exhibits no discharge. Left eye exhibits no discharge. Neck: Neck supple. No thyromegaly present. Cardiovascular: Normal rate, regular rhythm and normal heart sounds. No murmur heard. No leg swelling   Pulmonary/Chest: Effort normal and breath sounds normal. No stridor. No respiratory distress. She has no wheezes. She has no rales. Abdominal: Soft. Bowel sounds are normal. She exhibits no distension. There is no tenderness. Musculoskeletal: She exhibits no edema. Neurological: She is alert and oriented to person, place, and time. Skin: No rash noted. No erythema. Vitals reviewed.       Vitals:  BP (!) 150/82 (Site: Right Upper Arm)   Pulse 106   Wt 158 lb (71.7 kg)   SpO2 98%   BMI 28.90 kg/m²       Data:     Lab Results   Component Value Date     01/15/2019    K 4.0 01/15/2019     01/15/2019    CO2 24 01/15/2019    BUN 30 01/15/2019    CREATININE 1.49 01/15/2019    GLUCOSE 253 01/15/2019    PROT 5.8 01/10/2019    LABALBU 3.2 01/10/2019    BILITOT <0.10 01/10/2019    ALKPHOS 67 01/10/2019    AST 18 01/10/2019    ALT 17 01/10/2019     Lab Results   Component Value Date    WBC 8.6 01/10/2019    RBC 3.23 01/10/2019    HGB 9.5 01/10/2019 HCT 30.8 01/10/2019    MCV 95.4 01/10/2019    MCH 29.4 01/10/2019    MCHC 30.8 01/10/2019    RDW 14.6 01/10/2019     01/10/2019    MPV 10.3 01/10/2019     Lab Results   Component Value Date    TSH 2.07 04/18/2014     Lab Results   Component Value Date    CHOL 139 02/26/2015    HDL 44 02/26/2015    LABA1C 6.4 04/25/2019          Assessment/Plan:        1. Essential hypertension  Stable on cardizem and cozaar    2. Type 2 diabetes mellitus without complication, without long-term current use of insulin (HCC)  F/U 6mos, in office HgbA1C. Do daily foot checks and yearly eye exams stable on metformins  - POCT glycosylated hemoglobin (Hb A1C)    3. Pure hypercholesterolemia  Stable on lipitor    4. Shortness of breath  Ck CXR as lungs clear and no sign of CHF. If CXR normal then pt has appt in 5d with cardio and pt having PET scan per her oncologist in next several weeks too. - XR CHEST STANDARD (2 VW); Future    5. Oropharyngeal dysphagia  Pt continue speech therapy and f/u with ENT    6. Gastroesophageal reflux disease without esophagitis  Stable on prilosec    7. Cough  Monitor and stay on flonase and benadryl        Irene received counseling on the following healthy behaviors: nutrition and exercise  Reviewed prior labs and health maintenance  Continue current medications, diet and exercise. Discussed use, benefit, and side effects of prescribed medications. Barriers to medication compliance addressed. Patient given educational materials - see patient instructions  Was a self-tracking handout given in paper form or via [employer]?  Yes    Requested Prescriptions     Signed Prescriptions Disp Refills    atorvastatin (LIPITOR) 40 MG tablet 90 tablet 1     Sig: TAKE 1 TABLET BY MOUTH DAILY    metFORMIN (GLUCOPHAGE) 500 MG tablet 90 tablet 1     Sig: TAKE 1 TABLET BY MOUTH TWICE A DAY WITH FOOD    diltiazem (CARDIZEM CD) 180 MG extended release capsule 90 capsule 1     Sig: Take 1 capsule by mouth daily    omeprazole (PRILOSEC) 10 MG delayed release capsule 90 capsule 1     Sig: Take 1 capsule by mouth daily       All patient questions answered. Patient voiced understanding. Quality Measures    Body mass index is 28.9 kg/m². Elevated. Weight control planned discussed Healthy diet and regular exercise. BP: (!) 150/82. Blood pressure is normal. Treatment plan consists of No treatment change needed. Fall Risk 10/25/2018 5/11/2015 4/24/2014   2 or more falls in past year? no no no   Fall with injury in past year? no no no     The patient does not have a history of falls. I did not - not indicated , complete a risk assessment for falls. A plan of care for falls No Treatment plan indicated    Lab Results   Component Value Date    LDLCHOLESTEROL 56 02/26/2015    (goal LDL reduction with dx if diabetes is 50% LDL reduction)    PHQ Scores 4/25/2019 10/25/2018 5/11/2015 5/11/2015 1/24/2014   PHQ2 Score 0 0 0 0 0   PHQ9 Score 0 0 0 0 0     Interpretation of Total Score Depression Severity: 1-4 = Minimal depression, 5-9 = Mild depression, 10-14 = Moderate depression, 15-19 = Moderately severe depression, 20-27 = Severe depression        Return in about 6 months (around 10/25/2019) for HTN, DM, Hyperlipidemia.       Electronically signed by Erasto Maxwell MD on 4/28/2019 at 12:07 AM

## 2019-04-30 NOTE — PATIENT INSTRUCTIONS
SURVEY:    You may be receiving a survey from [employer] regarding your visit today. Please complete the survey to enable us to provide the highest quality of care to you and your family. If you cannot score us a very good on any question, please call the office to discuss how we could have made your experience a very good one. Thank you.

## 2019-04-30 NOTE — PROGRESS NOTES
I, Krissy Francis am scribing for and in the presence of Claudio Wylie MD, F.A.C.C..      Subjective:     CHIEF COMPLAINT / HPI:    Chief Complaint   Patient presents with    3 Month Follow-Up     HX: PAF, Moderate Mitral Valve Stenosis, HTN, Hyperlipidemia. Pt states she is doing ok. C/o: SOB with mild exertion, can feel her heart racing when she gets really SOB. Has been going to speech therapy to help with her voice which has helped. Denies: CP, Lighteaded/dizziness. Dear Dr. Varghese Yeung MD    I had the pleasure of seeing Wendy Melgar for follow up today. Dar Perea is 80 y.o. female with the listed medical history who presented for follow up. Patient has history of breast cancer status post lumpectomy followed by chemotherapy and XRT. Found to have abnormal ECG leading to cardiac evaluation on 6/16/2016. Her ECG was showed sinus rhythm with first-degree AV block, frequent APCs, right bundle branch block and left axis deviation. She has Holter monitor on 7/1/2016 which showed sinus rhythm with frequent APCs and short atrial runs. Her echo back at that time showed normal ejection fraction, mild mitral regurgitation and Mitral valvular calcification without significant stenosis. She denies any prior history of CAD, myocardial infarction or heart failure. Echo on 09/24/2018: EF 70%. LV wall thickness is mildly increased. Moderate mitral valve sclerosis with no significant stenosis. Mean gradient 4.4 mmHg, Mild mitral regurg. Evidence of moderate (grade II) diastolic dysfunction seen. Exercise Tolerance: She reports having a poor exercise tolerance. Ms. Dara Cano says that she can walk less than 1/2 block without developing shortness of breath and/or chest discomfort    ECG done in office on 1/25/2019)- Sinus Tachycardia with APCs and right bundle branch block. Echo on 01/25/2019: is grossly unchanged from prior. Ms. Dara Cano is here for a follow up.  She has a whispering voice due to her vocal cord paralysis. She is seen by ENT and currently doing speech therapy and think her voice is a little better. She has increased shortness of breath when talking. She reported occasional palpitations when she is short of breath. She has some chest congestion. She has a PET scan tomorrow ordered by her oncologist. She has coughing spells sometimes. She does well at night with no breathing issues at that time of day. She has lost weight due to her loss of appetite. She chokes when she drinks if she is not careful, but denies choking when eating. Denies any chest pain, pressure or tightness. No problems with medications. Past Medical History:    Past Medical History:   Diagnosis Date    Cancer ([facility] [address] 05/2015    right breast    Diabetes mellitus ([facility] [address]     Duodenal ulcer with hemorrhage     Esophageal hernia     H/O echocardiogram 06/28/2016    EF >65%. LV wall thickness is midly increased. Moderate mitral leaflet thickening with moderate posterior mitral valve leaflet prolapse. Mild mitral regurgitation. Moderate mitral valve stenosis mean gradient  of 6.4 mmHg. Moderate (ferade ll) diastolic dysfunction.  Hiatal hernia     History of echocardiogram 09/24/2018    EF 70%. LV wall thickness is mildly increased. Moderate mitral valve sclerosis w/ stenosis. Mean gradient 4.4. Mild mitral regurg. Evidence of moderate (grade II) diastolic dysfunction seen.  History of Holter monitoring 06/30/2016    Frequent PAC's with multiple short runs of what appears to be atrial fib.  including with RVR at times as fast as 160 bpm,    Hyperlipidemia     Hypertension     Macular degeneration     PONV (postoperative nausea and vomiting)     Stress fracture of lumbar vertebra     from coughing; dx last week     Past Surgical History:  Past Surgical History:   Procedure Laterality Date    BACK SURGERY  09/2013    cement fusion    BLADDER SUSPENSION      BREAST LUMPECTOMY Right 09/16/2015     Beth, Lumpectomy    CATARACT REMOVAL       SECTION      x1    EYE SURGERY      OTHER SURGICAL HISTORY Right 10/21/15    I&D Rt. breast abcess    STOMACH SURGERY  2013    In [facility] gastric ulcer-perforation-repair    TUNNELED VENOUS PORT PLACEMENT Left 2015    UPPER GASTROINTESTINAL ENDOSCOPY      UPPER GASTROINTESTINAL ENDOSCOPY N/A 2018    -bx(normal)antral/duodenal deformity,hiatal hernia     Social History:    Social History     Tobacco Use   Smoking Status Former Smoker    Packs/day: 0.25    Years: 4.00    Pack years: 1.00    Last attempt to quit: 1958    Years since quittin.3   Smokeless Tobacco Never Used   Tobacco Comment    quit 60 years ago     Current Medications:  Outpatient Medications Marked as Taking for the 19 encounter (Office Visit) with Nehemiah Amin MD   Medication Sig Dispense Refill    atorvastatin (LIPITOR) 40 MG tablet TAKE 1 TABLET BY MOUTH DAILY 90 tablet 1    metFORMIN (GLUCOPHAGE) 500 MG tablet TAKE 1 TABLET BY MOUTH TWICE A DAY WITH FOOD 90 tablet 1    diltiazem (CARDIZEM CD) 180 MG extended release capsule Take 1 capsule by mouth daily 90 capsule 1    omeprazole (PRILOSEC) 10 MG delayed release capsule Take 1 capsule by mouth daily 90 capsule 1    apixaban (ELIQUIS) 2.5 MG TABS tablet Take 1 tablet by mouth 2 times daily 180 tablet 3    losartan (COZAAR) 100 MG tablet TAKE 1 TABLET BY MOUTH DAILY 90 tablet 1    fluticasone (FLONASE) 50 MCG/ACT nasal spray 1 spray by Nasal route daily (Patient taking differently: 1 spray by Nasal route daily as needed ) 1 Bottle 3    diphenhydrAMINE (BENADRYL) 25 MG tablet   Take 25 mg by mouth nightly as needed for Sleep       Multiple Vitamins-Minerals (THERAPEUTIC MULTIVITAMIN-MINERALS) tablet Take 1 tablet by mouth daily       acetaminophen (TYLENOL) 500 MG tablet Take 1,000 mg by mouth every 6 hours as needed.       Calcium Carbonate-Vitamin D (CALCIUM 600 + D PO) Take 1 tablet by mouth daily.  Multiple Vitamins-Minerals (OCUVITE PRESERVISION PO) Take  by mouth daily. REVIEW OF SYSTEMS:    CONSTITUTIONAL:+ weight loss, no fatigue, weakness, night sweats or fever. HEENT: No new vision difficulties or ringing in the ears. RESPIRATORY: See HPI  CARDIOVASCULAR: See HPI  GI: No nausea, vomiting, diarrhea, constipation, abdominal pain or changes in bowel habits. : No urinary frequency, urgency, incontinence hematuria or dysuria. SKIN: No cyanosis or skin lesions. MUSCULOSKELETAL: No new muscle or joint pain. NEUROLOGICAL: No syncope or TIA-like symptoms. PSYCHIATRIC: No anxiety, pain, insomnia or depression    Objective:     PHYSICAL EXAM:      VITALS:    /83 (Site: Left Upper Arm, Position: Sitting, Cuff Size: Medium Adult)   Pulse 95   Resp 20   Ht 5' 2\" (1.575 m)   Wt 156 lb 12.8 oz (71.1 kg)   SpO2 97%   BMI 28.68 kg/m²   CONSTITUTIONAL: Cooperative, no apparent distress, and appears well nourished / developed. NEUROLOGIC:  Awake and orientated to person, place and time. HEENT: Sclera non-icteric, normocephalic, neck supple, no elevation of JVP, normal carotid pulses with no bruits and thyroid normal size. LUNGS:  [address] air entry bilaterally. No significant wheezes or crackles. CARDIOVASCULAR:  Regular rate and rhythm with no gallops, rubs, or abnormal heart sounds. The apical impulses not displaced. 2/6 ejection systolic murmur at the left upper sternal border without radiation. The carotid upstroke is normal in amplitude and contour without delay or bruit. JVP is not elevated. ABDOMEN:  Normal bowel sounds, non-distended and non-tender to palpation. EXT: Trace lower extremity edema. 1/2 way up to the knees bilaterally  no calf tenderness. Pulses are present bilaterally.     DATA:    Lab Results   Component Value Date    ALT 17 01/10/2019    AST 18 01/10/2019    ALKPHOS 67 01/10/2019    BILITOT <0.10 (L) 01/10/2019     Lab Results   Component Value Date    CREATININE 1.49 (H) 01/15/2019    BUN 30 (H) 01/15/2019     01/15/2019    K 4.0 01/15/2019     01/15/2019    CO2 24 01/15/2019     Lab Results   Component Value Date    TSH 2.07 04/18/2014     Lab Results   Component Value Date    WBC 8.6 01/10/2019    HGB 9.5 (L) 01/10/2019    HCT 30.8 (L) 01/10/2019    MCV 95.4 01/10/2019     01/10/2019       Lab Results   Component Value Date    TRIG 196 (H) 02/26/2015    TRIG 165 (H) 10/22/2014    TRIG 265 (H) 04/18/2014     Lab Results   Component Value Date    HDL 44 02/26/2015    HDL 51 10/22/2014    HDL 49 04/18/2014     Lab Results   Component Value Date    LDLCHOLESTEROL 56 02/26/2015    LDLCHOLESTEROL 61 10/22/2014    LDLCHOLESTEROL 160 (H) 04/18/2014       Assessment:      Diagnosis Orders   1. Paroxysmal atrial fibrillation (HCC)     2. Chronic anticoagulation     3. Essential hypertension     4. Dyslipidemia     5. Mitral and aortic valve disease     6. Non-rheumatic mitral valve stenosis     7. Abnormal ECG     8. SOB (shortness of breath)       Plan:     · Shortness of breath with mild exertion:   I think her shortness of breath is multifactorial from vocal cord paralysis, age-related decline in lung function, multiple thoracic spine compression fractures causing exaggerated kyphosis and possible lung involvement in her malignancy.  She is scheduled to have a PET scan tomorrow.  I reviewed her chest x-ray, unremarkable.  Her lung examination today is acceptable. No signs of volume overload. No wheezing.  I told her if she continues to remain short of breath will radiate to get CT of the lung. Her creatinine is 1.49, I am trying to avoid using contrast exposing her to radiation at this point.  Discussed with her and her daughter and both understand.  Follow-up with ENT as previously scheduled. · Paroxysmal Atrial Fibrillation:     · Calcium Channel Blocker: Continue diltiazem 180 mg daily once daily.     · ACE Inibitor/ARB: Continue losartan (Cozaar) 100 mg once daily. · Antiplatelet Agent: History of peptic ulcer not taking aspirin because of this. · Stroke Risk: Her CHADS2 score is greater than 2(2.2% stroke risk)  · Anticoagulation: Continue Apixaban (Eliquis) 2.5 mg every 12 hours. I also reminded her to watch for signs of blood in her stool or black tarry stools and stop the medication immediately if this develops as this could be life threatening. · Hyperlipidemia: Mixed   · Statin Therapy: Continue atorvastatin (Lipitor) 40 mg nightly. Essential Hypertension: Controlled  · ACE Inibitor/ARB: Continue losartan (Cozaar) 100 mg once daily I discussed the potential side effects of this medication including lightheadedness and dizziness and told her to call the office if this occurs. · Calcium Channel Blocker: Continue [id number] mg daily. · Degenerative mitral valve disease. · Continue medication therapy as above. FOLLOW UP:  I told Ms. Govea to call my office if she had any problems, but otherwise told her to Return in about 3 months (around 7/30/2019) for Follow up. However, I would be happy to see her sooner should the need arise. Sincerely,  Vamsi Lin MD, F.A.C.C. [facility] Cardiology Specialist    [address] [facility], [address] [place] [address]  Phone: [phone], Fax: [phone]        I believe that the risk of significant morbidity and mortality related to the patient's current medical conditions are: Intermediate. The documentation recorded by the scribe, accurately and completely reflects the services I personally performed and the decisions made by me. Vamsi Lin MD, F.A.C.C.  April 30, 2019

## 2019-05-03 NOTE — PROGRESS NOTES
Phone: [phone]    Fax: [phone]                                 Outpatient Speech Therapy                               DAILY TREATMENT NOTE    Date: 5/3/2019  Patients Name:  Dar Perea  YOB: 1934 (80 y.o.)  Gender:  female  MRN:  [id number]  [facility] #: [id number]  Referring physician:Facundo [address] [place] [address] Insurance Information: Medicare       Total # of Visits to Date: 15           Current Authorization        PAIN  [x]No     []Yes      Pain Rating (0-10 pain scale): 0  Location:  N/A  Pain Description:  NA    SUBJECTIVE  Patient presents to clinic with herself. SHORT TERM GOALS/ TREATMENT SESSION:  Subjective report:          Patient reports an eventful past few weeks. She received a chest X-Ray which she reports was  \"negative for fluid in lungs. \" 6 mo checkup with regular doctor and she stated problem was \"posture. \" Heart doctor said heart sounds okay and lungs sounded okay. Had a PET scan yesterday. Going to get results of PET scan today with follow up appointment for oncologist. Patient reports that her breathlessness is getting worse. She reports that she feels her voice feels the same. The patient also states she isn't completing her exercises as much as she could. Reports quality of mucous is more liquid and is still clear. Patient is demonstrating increased shortness of breath with talking this date. Goal 1: Patient will complete vocal adduction and breath support exercises with minimal verbal cues. Completed with moderate verbal cues. Patient cued to take breaths between vocal exercises. Reduced breath support continues and is more pronounced since previous session. []Met  [x]Partially met  []Not met   Goal 2: Patient will utilize compensatory strategies (pacing, breath support, etc) in conversation with 80% accuracy given minimal verbal cues. Utilized with 60% accuracy.  Patient attempting to speak in longer phrases despite increased difficulty breathing and coordinating breathing and speaking. []Met  [x]Partially met  []Not met   Goal 3: Patient will sustain phonation for 5 seconds given minimal verbal cues. Patient sustaining phonation for less than 1 second despite maximal cues. []Met  [x]Partially met  []Not met   Goal 4: Patient will complete oropharyngeal exercises 15x each following clinician model to improve pharyngeal functioning. Patient completed oropharyngeal exercises 15x each independently. Included tongue base retraction exercises, Courtney, Mendelsohn, Effortful Swallow, and supraglottic Swallow. Frequent breaks required during tasks. [x]Met  []Partially met  []Not met   Goal 5: Patient will utilize compensatory strategies during a meal/snack with minimal cues in 80% of opportunities. Immediate cough on thin liquids and  head turn to left and second swallow. Patient reported feeling of residue in neck with nectar-thick liquids. No coughing with thin liquids with straw. []Met  [x]Partially met  []Not met     LONG TERM GOALS/ TREATMENT SESSION:  Goal 1: Patient will improve vocal quality for communication for daily needs. Goal progressing. See STG data   []Met  [x]Partially met  []Not met   Goal 2: Patient will independently utilize compensatory strategies during a meal/snack across 2 consecutive sessions. Goal progressing.  See STG data     []Met  [x]Partially met  []Not met       EDUCATION/HOME EXERCISE PROGRAM (HEP)  New Education/HEP provided to patient/family/caregiver:    []Yes:     [x]No (Continued review of prior education)   If yes Education Provided:     Method of Education:     [x]Discussion     []Demonstration    [] Written     []Other  Evaluation of Patients Response to Education:         []Patient and or caregiver verbalized understanding  []Patient and or Caregiver Demonstrated without assistance   []Patient and or Caregiver Demonstrated with

## 2019-05-06 NOTE — TELEPHONE ENCOUNTER
Patients daughter,Melanie called and stated that Parisa Mcintyre has stage 4 breast cancer and wanted to  make sure you did look over the PET scan results.

## 2019-05-06 NOTE — TELEPHONE ENCOUNTER
[facility] had her PET CT done on 5/1. She does have Stage 4 metastatic breast cancer. They are giving her roughly 6 months to live, but they are going to try the chemo pill and that will hopefully give her a couple extra months. Health Maintenance   Topic Date Due    DTaP/Tdap/Td vaccine (1 - Tdap) 09[birth year]/1953    Shingles Vaccine (1 of 2) 09/22/1984    DEXA (modify frequency per FRAX score)  09/22/1999    Potassium monitoring  01/15/2020    Creatinine monitoring  01/15/2020    Pneumococcal 65+ years Vaccine  Completed             (applicable per patient's age: Cancer Screenings, Depression Screening, Fall Risk Screening, Immunizations)    Hemoglobin A1C (%)   Date Value   04/25/2019 6.4   10/25/2018 6.3   04/23/2018 6.1     Microalb/Crt.  Ratio (mcg/mg creat)   Date Value   02/26/2015 CANNOT BE CALCULATED     LDL Cholesterol (mg/dL)   Date Value   02/26/2015 56     AST (U/L)   Date Value   01/10/2019 18     ALT (U/L)   Date Value   01/10/2019 17     BUN (mg/dL)   Date Value   01/15/2019 30 (H)      (goal A1C is < 7)   (goal LDL is <100) need 30-50% reduction from baseline     BP Readings from Last 3 Encounters:   05/03/19 130/81   04/30/19 135/83   04/25/19 (!) 150/82    (goal /80)      All Future Testing planned in [facility]:  Lab Frequency Next Occurrence   ESOPHAGOSCOPY / EGD Once 10/16/2018   Hemoglobin A1C Once 10/24/2019   Lipid Panel Once 10/24/2019   Comprehensive Metabolic Panel Once 74/44/3350       Next Visit Date:  Future Appointments   Date Time Provider Belkys Elizalde   5/7/2019  1:00 PM Ulices Porter [address]   5/10/2019  2:15 PM Mandy Gonsalez [address]   5/14/2019  2:30 PM LAURYN Gonsalez [facility]   5/17/2019  2:15 PM LAURYN Gonsalez [facility]   7/29/2019 11:20 AM MD KIM [facility]   10/24/2019  1:30 PM MD Christie Gaona [facility]            Patient Active Problem List:     Bilateral atelectasis     Bilateral pleural effusion Thoracic vertebral fracture (T9 fracture)     DM type 2 (diabetes mellitus, type 2) (HCC)     Essential hypertension     Duodenal ulcer s/p surgery     Fall involving wheelchair causing accidental injury     Abdominal pain, other specified site     Anemia     History of duodenal ulcer     Dysphagia     Malignant neoplasm of upper-outer quadrant of female breast (HCC)     Paroxysmal atrial fibrillation (HCC)     Lung nodule     Duodenal ulcer with hemorrhage     Pure hypercholesterolemia     Nausea     COPD exacerbation (HCC)     Bronchitis     Hypoxia     Vocal cord paralysis, unilateral complete     Acute renal failure superimposed on stage 2 chronic kidney disease (HCC)     Acute cystitis

## 2019-05-06 NOTE — TELEPHONE ENCOUNTER
Tried calling Melanie, I did not leave a message. Please call her tomorrow morning and immediately talked to her.   Thank you

## 2019-05-07 NOTE — PROGRESS NOTES
Phone: [address]    Fax: [phone]                                 Outpatient Speech Therapy                               DAILY TREATMENT NOTE    Date: 5/7/2019  Patients Name:  Jero Jordan  YOB: 1934 (80 y.o.)  Gender:  female  MRN:  [id number]  [facility] #: [id number]  Referring Nohelia COMBS Insurance Information: Medicare       Total # of Visits to Date: 14   No Show: 0   Canceled Appointment: 0   Current Authorization  Comments: 0     PAIN  [x]No     []Yes      Pain Rating (0-10 pain scale):0  Location:  N/A  Pain Description:  NA    SUBJECTIVE  Patient presents to clinic with her daughter. SHORT TERM GOALS/ TREATMENT SESSION:  Subjective report:          Patient pleasant this date. Patient was recently diagnosed with stage 4 metastatic breast cancer with liver, lung, subclavicular nodules. Patient's daughter reports they will begin chemotherapy pill. Patient wishes to continue speech therapy for her voice 1x /week. ST, patient, and daughter discussed prognosis and implications for speech and swallowing extensively. ST expressed concerns for difficulty swallowing and patient in agreement that she has been having more difficulty, but is not ready to consume thickened-liquids despite recommendations. ST provided strategies to utilize for safe swallow and energy conservation techniques as patient reports she has been losing weight. Goal 1: Patient will complete vocal adduction and breath support exercises with minimal verbal cues. Patient completed 15x each with min cues. [x]Met  []Partially met  []Not met   Goal 2: Patient will utilize compensatory strategies (pacing, breath support, etc) in conversation with 80% accuracy given minimal verbal cues. Utilized 70% accuracy with mod cues.    []Met  [x]Partially met  []Not met   Goal 3: Patient will sustain phonation for 5 seconds given minimal verbal cues. Patient sustaining for 1 second. []Met  [x]Partially met  []Not met   Goal 4: Patient will complete oropharyngeal exercises 15x each following clinician model to improve pharyngeal functioning. DNT this date due to time constraints and education. []Met  [x]Partially met  []Not met   Goal 5: Patient will utilize compensatory strategies during a meal/snack with minimal cues in 80% of opportunities. DNT this date due to time constraints and education. []Met  [x]Partially met  []Not met     LONG TERM GOALS/ TREATMENT SESSION:  Goal 1: Patient will improve vocal quality for communication for daily needs. Goal progressing. See STG data   []Met  [x]Partially met  []Not met   Goal 2: Patient will independently utilize compensatory strategies during a meal/snack across 2 consecutive sessions. Goal progressing.  See STG data   []Met  [x]Partially met  []Not met       EDUCATION/HOME EXERCISE PROGRAM (HEP)  New Education/HEP provided to patient/family/caregiver:    []Yes:     [x]No (Continued review of prior education)   If yes Education Provided:     Method of Education:     [x]Discussion     []Demonstration    [] Written     []Other  Evaluation of Patients Response to Education:         [x]Patient and or caregiver verbalized understanding  []Patient and or Caregiver Demonstrated without assistance   []Patient and or Caregiver Demonstrated with assistance  []Needs additional instruction to demonstrate understanding of education    ASSESSMENT  Patient tolerated todays treatment session:    [x] Good   []  Fair   []  Poor  Limitations/difficulties with treatment session due to:   []Pain     []Fatigue     []Other medical complications     []Other    Comments:    PLAN  [x]Continue with current plan of care  [][facility]  []IHold per patient request  [] Change Treatment plan:  [] Insurance hold  __ Other     TIME   Time Treatment session was INITIATED 1300   Time Treatment session was STOPPED 1345    45     Charges: 1  Electronically signed by:    Lavonne Fernandez M.S. CF-SLP                Date:5/7/2019

## 2019-05-13 NOTE — PROGRESS NOTES
quit smoking about 61 years ago. She has a 1.00 pack-year smoking history. She has never used smokeless tobacco. She reports that she does not drink alcohol or use drugs. REVIEW OF SYSTEMS:     General: No fever no chills, fatigue is about the same, new hoarseness of voice  ENT: No double vision, visual difficulty due to macular degeneration. no tinnitus or hearing problem,   hoarseness of voice as discussed  Respiratory: No chest pain, no shortness of breath, no cough or hemoptysis. S/p right lumpectomy and node dissection on the right side, new pain and lesion in the left side as discussed  Cardiovascular: Denies chest pain, PND or orthopnea. + L E swelling , no palpitations. Gastrointestinal:    Mild  Nausea, no  vomiting, abdominal pain, diarrhea or constipation. Genitourinary: Denies dysuria, hematuria, frequency, urgency or incontinence. Neurological: Denies headaches, decreased LOC, no sensory or motor focal deficits. Musculoskeletal:  No arthralgia , + back pain. Hematologic: no bleeding , no adenopathy. PHYSICAL EXAM:  The patient is not in acute distress. Vital signs: Blood pressure 130/81, pulse 117, temperature 97.3 °F (36.3 °C), temperature source Temporal, resp. rate 18, height 5' 2\" (1.575 m), weight 157 lb 3.2 oz (71.3 kg), not currently breastfeeding. HEENT:  Eyes are normal. Ears, nose and throat are normal.  Neck: Supple. No lymph node enlargement. No thyroid enlargement. Trachea is centrally located. Chest:  Clear to auscultation. Decreased air entry in the right base with dullness. Breast: s/p right lumpectomy , Post radiation changes have improved significantly. No other masses or adenopathy are appreciated. .  Left breast is free of any masses or adenopathy or infection. Rt side: No palpable lesion appreciated. Possibly fullness in Rt axilla. She has a random soreness in that area. Heart: Irregular with frequent missed beats . Abdomen: Soft, nontender.   No hepatosplenomegaly. No masses. Extremities:  + edema. Lymph Nodes:  No cervical, axillary or inguinal lymph node enlargement. Neurologic:  Conscious and oriented. No focal neurological deficits. Psychosocial: No depression, anxiety or stress. Skin: No rashes, bruises or ecchymoses. Review of Diagnostic data:    Ct scan  Impression   IMPRESSION:    1. New postoperative findings of the right breast and axilla   2. Tiny residual right axillary lymph node   3. Large hiatal hernia   4. Benign right renal cysts   5. Stable compression fracture T9 with methylmethacrylate augmentation     CT of the chest 5/4/2016  1. Slight decrease in size of the residual right axillary lymph node   2. Progressive but benign appearing pleural parenchymal thickening in both lung apices         Lab Results   Component Value Date    WBC 8.6 01/10/2019    HGB 9.5 (L) 01/10/2019    HCT 30.8 (L) 01/10/2019    MCV 95.4 01/10/2019     01/10/2019       Chemistry        Component Value Date/Time     01/15/2019 1013    K 4.0 01/15/2019 1013     01/15/2019 1013    CO2 24 01/15/2019 1013    BUN 30 (H) 01/15/2019 1013    CREATININE 1.49 (H) 01/15/2019 1013        Component Value Date/Time    CALCIUM 10.2 01/15/2019 1013    ALKPHOS 67 01/10/2019 0550    AST 18 01/10/2019 0550    ALT 17 01/10/2019 0550    BILITOT <0.10 (L) 01/10/2019 0550        Ct scan  1. New 5 mm nodule along the right major fissure which is likely intrafissural lymph node. A 3 month follow-up CT is recommended. 2. Otherwise stable examination.         Mammogram     FINDINGS: Compared to 11/30/2017 and 11/28/2016. Susana Mcgraw left breast is heterogeneously dense. Stable benign scattered and vascular calcifications in the breast. No mammographic or sonographic abnormality at the site of tender palpation.       Ultrasound  FINDINGS: At the site of tenderness and palpation, no focal lesion is identified.  There is normal fibroglandular tissue and underlying undergoing neoadjuvant chemotherapy in the form of weekly carbotaxol. He received 3 cycles of chemotherapy with very good clinical response. Ultrasound showing some shrinkage but does not reflect the true clinical response that I do appreciate by clinical exam.  Patient is getting more fatigue from chemotherapy. We feel that moving to surgery would be the right answer for the patient. Surgery was done around 16 September 2015 and showed 2.2 cm residual cancer as well as multiple lymph nodes, around 9 that are positive  2. Underwent adjuvant radiation. 3. Advanced age and multiple comorbidities   4. Atrial fibrillation, treated with anticoagulation and rate control   5. Axillary Lymphadenopathy. 6. Undergoing surveillance, Repeat CT scan will be ordered  7. Small fullness in Rt axilla. The lesion is questionable. 8. Lung nodule, currently 7 mm  9. Recent vocal cord paralysis. 10. Evidence of cancer recurrence with metastatic disease on PET/CT scan 5/1/19          PLAN:   Patient had progressive symptoms and she is having a larger pleural effusion. PET/CT scan showed evidence of recurrence with metastatic disease. Obviously the findings on PET/CT scan findings explain the vocal cord paralysis. Discussed options of treatment with active systemic treatment VS palliative care and supportive approach. Patient is undecided. offered Xeloda. She will think about it. She will call us with her decision.                                                         [facility]                          Cell: [phone]

## 2019-05-15 NOTE — PROGRESS NOTES
Phone: [address]    Fax: [phone]                                 Outpatient Speech Therapy                               DAILY TREATMENT NOTE    Date: 5/15/2019  Patients Name:  Charlene Gomes  YOB: 1934 (80 y.o.)  Gender:  female  MRN:  [id number]  [facility] #: [id number]  Referring physician:Facundo [address] [place] [address] Insurance Information: Medicare       Total # of Visits to Date: 15   No Show: 0   Canceled Appointment: 0   Current Authorization        PAIN  [x]No     []Yes      Pain Rating (0-10 pain scale): 0  Location:  N/A  Pain Description:  NA    SUBJECTIVE  Patient presents to clinic with her self. SHORT TERM GOALS/ TREATMENT SESSION:  Subjective report:          Patient got second opinion and will get biopsy to determine if patient has triple negative cancer and if patient can be treated. Patient reports no changes with voice and reports improvements with swallowing with less \"mucuous. \"      Goal 1: Patient will complete vocal adduction and breath support exercises with minimal verbal cues. Patient completed 15x each with min cues. Continued decreased breath support. Sustaining /s/ for average of 6.9 seconds. []Met  [x]Partially met  []Not met   Goal 2: Patient will utilize compensatory strategies (pacing, breath support, etc) in conversation with 80% accuracy given minimal verbal cues. Utilized 70% accuracy with mod cues. []Met  []Partially met  []Not met   Goal 3: Patient will sustain phonation for 5 seconds given minimal verbal cues. Patient sustaining phonation for less than 1   []Met  [x]Partially met  []Not met   Goal 4: Patient will complete oropharyngeal exercises 15x each following clinician model to improve pharyngeal functioning.  Patient completed oropharyngeal exercises 15x each independently. Included tongue base retraction exercises, Courtney, Mendelsohn, Effortful Swallow, and supraglottic Swallow. Frequent breaks required during tasks. []Met  [x]Partially met  []Not met   Goal 5: Patient will utilize compensatory strategies during a meal/snack with minimal cues in 80% of opportunities. Utilized head turn with double swallow. Immediate cough with thin liquids via cup. No coughs with thin liquids via straw. []Met  [x]Partially met  []Not met     LONG TERM GOALS/ TREATMENT SESSION:  Goal 1: Patient will improve vocal quality for communication for daily needs. Goal progressing. See STG data   []Met  [x]Partially met  []Not met   Goal 2: Patient will independently utilize compensatory strategies during a meal/snack across 2 consecutive sessions. Goal progressing.  See STG data     []Met  [x]Partially met  []Not met       EDUCATION/HOME EXERCISE PROGRAM (HEP)  New Education/HEP provided to patient/family/caregiver:    []Yes:     [x]No (Continued review of prior education)   If yes Education Provided:     Method of Education:     [x]Discussion     []Demonstration    [] Written     []Other  Evaluation of Patients Response to Education:         [x]Patient and or caregiver verbalized understanding  []Patient and or Caregiver Demonstrated without assistance   []Patient and or Caregiver Demonstrated with assistance  []Needs additional instruction to demonstrate understanding of education    ASSESSMENT  Patient tolerated todays treatment session:    [x] Good   []  Fair   []  Poor  Limitations/difficulties with treatment session due to:   []Pain     []Fatigue     []Other medical complications     []Other    Comments:    PLAN  [x]Continue with current plan of care  [][facility]  []IHold per patient request  [] Change Treatment plan:  [] Insurance hold  __ Other     TIME   Time Treatment session was INITIATED 1415   Time Treatment session was STOPPED 1500    45     Charges: 1  Electronically signed by:    Sandee Silva M.S. CF-SLP              Date:5/15/2019

## 2019-05-24 NOTE — PROGRESS NOTES
Phone: [address]    Fax: [phone]                                 Outpatient Speech Therapy                               DAILY TREATMENT NOTE    Date: 5/24/2019  Patients Name:  Corrinne Fleck  YOB: 1934 (80 y.o.)  Gender:  female  MRN:  [id number]  [facility] #: [id number]  Referring physician:Facundo [address] [place] [address] Insurance Information: Medicare       Total # of Visits to Date: 12   No Show: 0   Canceled Appointment: 1   Current Authorization        PAIN  [x]No     []Yes      Pain Rating (0-10 pain scale): 0  Location:  N/A  Pain Description: NA    SUBJECTIVE  Patient presents to clinic with self on time. SHORT TERM GOALS/ TREATMENT SESSION:  Subjective report:           Patient pleasant and cooperative throughout therapy this date. Patient recently underwent biopsy in [place] to determine what type of cancer she has. No results from evaluation. Patient reports coughing more even when no drinking. Still reports coughing up \"phlegm\"          Goal 1: Patient will complete vocal adduction and breath support exercises with minimal verbal cues. Patient completed 15x each with minimal verbal cues. Continued decreased breath support. Average /s/ phonation of 7.4 seconds. [x]Met  []Partially met  []Not met   Goal 2: Patient will utilize compensatory strategies (pacing, breath support, etc) in conversation with 80% accuracy given minimal verbal cues. Utilized 70% accuracy with mod cues. []Met  []Partially met  []Not met   Goal 3: Patient will sustain phonation for 5 seconds given minimal verbal cues. Sustaining \"ah\" for 1 second. []Met  [x]Partially met  []Not met   Goal 4: Patient will complete oropharyngeal exercises 15x each following clinician model to improve pharyngeal functioning.  Patient completed oropharyngeal exercises 15x each independently. Included tongue base retraction exercises, Courtney, Mendelsohn, Effortful Swallow, and supraglottic Swallow. Frequent breaks required during tasks. []Met  []Partially met  []Not met   Goal 5: Patient will utilize compensatory strategies during a meal/snack with minimal cues in 80% of opportunities. Patient utilized strategies during drinking thin water with 100% accuracy. No overt signs/symptoms of aspiration/penetration in 100%  [x]Met  []Partially met  []Not met     LONG TERM GOALS/ TREATMENT SESSION:  Goal 1: Patient will improve vocal quality for communication for daily needs. Goal progressing. See STG data   []Met  [x]Partially met  []Not met   Goal 2: Patient will independently utilize compensatory strategies during a meal/snack across 2 consecutive sessions. Goal progressing.  See STG data'         []Met  [x]Partially met  []Not met       EDUCATION/HOME EXERCISE PROGRAM (HEP)  New Education/HEP provided to patient/family/caregiver:    []Yes:     [x]No (Continued review of prior education)   If yes Education Provided:     Method of Education:     [x]Discussion     []Demonstration    [] Written     []Other  Evaluation of Patients Response to Education:         [x]Patient and or caregiver verbalized understanding  []Patient and or Caregiver Demonstrated without assistance   []Patient and or Caregiver Demonstrated with assistance  []Needs additional instruction to demonstrate understanding of education    ASSESSMENT  Patient tolerated todays treatment session:    [x] Good   []  Fair   []  Poor  Limitations/difficulties with treatment session due to:   []Pain     []Fatigue     []Other medical complications     []Other    Comments:    PLAN  [x]Continue with current plan of care  [][facility]  []IHold per patient request  [] Change Treatment plan:  [] Insurance hold  __ Other     TIME   Time Treatment session was INITIATED 1415   Time Treatment session was STOPPED 1500    45     Charges: 1  Electronically signed by:    Ish Decker M.S. CF-SLP Date:5/24/2019

## 2019-05-29 NOTE — PLAN OF CARE
Phone: Betty    Fax: [phone]                       Outpatient Speech Therapy                                                                         Updated Plan of Care    Patient Name: Ruth eLal  : 1934  (80 y.o.) Gender: female   Diagnosis:   R13.12, R 49.0    [facility] #: [id number]  PCP:Tarah Raines MD  Referring physician: Margarita Shipley   Onset Date:   INSURANCE  SLP Insurance Information: Medicare   Total # of Visits to Date: 16 No Show: 0   Canceled Appointment: 1     Dates of Service to Include: 5/3/19 through 19    Evaluations      Procedure/Modalities  [x]Speech/Lang Evaluation/Re-evaluation  [x] Speech Therapy Treatment   []Aphasia Evaluation     []Cognitive Skills Treatment  [x] Evaluation: Swallow/Oral Function   [x] Swallow/Oral Function Treatment  [] Evaluation: Communication Device  []  Group Therapy Treatment   [] Evaluation: Voice     [] Modification of AAC Device         [] Electrical Stimulation (NMES)         []Therapeutic Exercises:                  Frequency:1 times/week   Timeframe for Short Term Goals: 30  days         Short-term Goal(s): Current Progress   Goal 1: Patient will complete vocal adduction and breath support exercises with minimal verbal cues. []Met  [x]Partially met  []Not met   Goal 2: Patient will utilize compensatory strategies (pacing, breath support, etc) in conversation with 80% accuracy given minimal verbal cues. []Met  [x]Partially met  []Not met   Goal 3: Patient will sustain phonation for 5 seconds given minimal verbal cues. []Met  [x]Partially met  []Not met   Goal 4: Patient will complete oropharyngeal exercises 15x each following clinician model to improve pharyngeal functioning. []Met  [x]Partially met  [] Not met   Goal 5: Patient will utilize compensatory strategies during a meal/snack with minimal cues in 80% of opportunities.  []Met  [x]Partially met  [] Not met       Timeframe for Long-term Goals: 90 Days       Long-term Goal(s): Current Progress   Goal 1: Patient will improve vocal quality for communication for daily needs. []Met  [x]Partially met  []Not met   Goal 2: Patient will independently utilize compensatory strategies during a meal/snack across 2 consecutive sessions. []Met  [x]Partially met  [] Not met     Rehab Potential  [] Excellent  [] Good   [x] Fair   [] Poor    Patient is making adequate progress towards goals; however, patient has just been diagnosed with stage 4 cancer. Vital-stimulation will be discontinued and traditional swallowing therapy and voice therapy will be completed. Electronically signed by:    Ashley Garcia M.S. CF-SLP      Date:5/3/2019    Regulatory Requirements  I have reviewed this plan of care and certify a need for medically necessary rehabilitation services.     Physician Signature:_____________________________________     Date:5/3/2019  Please sign and fax to [phone]

## 2019-05-30 NOTE — PROGRESS NOTES
Phone: [address]    Fax: [phone]                                 Outpatient Speech Therapy                               DAILY TREATMENT NOTE    Date: 5/30/2019  Patients Name:  Pilar Bundy  YOB: 1934 (80 y.o.)  Gender:  female  MRN:  [id number]  [facility] #: [id number]  Referring physician:Facundo [address] [place] [address] Insurance Information: Medicare       Total # of Visits to Date: 16   No Show: 0   Canceled Appointment: 1   Current Authorization  Comments: 0     PAIN  [x]No     []Yes      Pain Rating (0-10 pain scale): 0  Location:  N/A  Pain Description:  NA    SUBJECTIVE  Patient presents to clinic with self at this time. SHORT TERM GOALS/ TREATMENT SESSION:  Subjective report:           Patient reports that biopsy. She is thinking positive about the situation. Reports voice is better in the morning, but reports voice is \"off and on. \" No other concerns at this time. Goal 1: Patient will complete vocal adduction and breath support exercises with minimal verbal cues. Patient completed 15x each with minimal verbal cues. Continued decreased breath support. Average /s/ phonation of 8. Seconds with maximal encouragement   [x]Met  []Partially met  []Not met   Goal 2: Patient will utilize compensatory strategies (pacing, breath support, etc) in conversation with 80% accuracy given minimal verbal cues. Utilized 70% accuracy with mod cues. [x]Met  []Partially met  []Not met   Goal 3: Patient will sustain phonation for 5 seconds given minimal verbal cues. Patient sustained phonation for > 1 second. []Met  [x]Partially met  []Not met   Goal 4: Patient will complete oropharyngeal exercises 15x each following clinician model to improve pharyngeal functioning.  Patient completed oropharyngeal exercises 15x each independently. Included tongue base retraction exercises, Courtney, Mendelsohn, Effortful Swallow, and supraglottic Swallow. Frequent breaks required during tasks. []Met  []Partially met  []Not met   Goal 5: Patient will utilize compensatory strategies during a meal/snack with minimal cues in 80% of opportunities. Immediate cough with thin liquids via cup. No cough with thin liquids via straw. []Met  [x]Partially met  []Not met     LONG TERM GOALS/ TREATMENT SESSION:  Goal 1: Patient will improve vocal quality for communication for daily needs. Goal progressing. See STG data   []Met  [x]Partially met  []Not met   Goal 2: Patient will independently utilize compensatory strategies during a meal/snack across 2 consecutive sessions. Goal progressing.  See STG data   []Met  [x]Partially met  []Not met       EDUCATION/HOME EXERCISE PROGRAM (HEP)  New Education/HEP provided to patient/family/caregiver:    []Yes:     [x]No (Continued review of prior education)   If yes Education Provided:    Method of Education:     [x]Discussion     []Demonstration    [] Written     []Other  Evaluation of Patients Response to Education:         []Patient and or caregiver verbalized understanding  []Patient and or Caregiver Demonstrated without assistance   []Patient and or Caregiver Demonstrated with assistance  []Needs additional instruction to demonstrate understanding of education    ASSESSMENT  Patient tolerated todays treatment session:    [x] Good   []  Fair   []  Poor  Limitations/difficulties with treatment session due to:   []Pain     []Fatigue     []Other medical complications     []Other    Comments:    PLAN  [x]Continue with current plan of care  [][facility]  []IHold per patient request  [] Change Treatment plan:  [] Insurance hold  __ Other     TIME   Time Treatment session was INITIATED 1415   Time Treatment session was STOPPED 1500    45     Charges: 1  Electronically signed by:    Suzette Mon M.S. CF-SLP              Date:5/30/2019

## 2019-05-30 NOTE — PROGRESS NOTES
Johnny Kendall am scribing for and in the presence of Hayder Wylie MD, F.A.C.C..    Subjective:     CHIEF COMPLAINT / HPI:    Chief Complaint   Patient presents with    Follow-up     HX: PAF, Moderate Mitral Valve Stenosis, HTN, Hyperlipidemia. Pt states she is doing ok. C/o: Worsening SOB. Can feel palpiaitons when she gets out of breath. Little bit of dizziness. Deneis: CP.     Dear Dr. Christopher Sequeira MD    I had the pleasure of seeing Ling Butt for follow up today. Ronak Joe is 80 y.o. female with the listed medical history who presented for follow up. Patient has history of breast cancer status post lumpectomy followed by chemotherapy and XRT. Found to have abnormal ECG leading to cardiac evaluation on 6/16/2016. Her ECG was showed sinus rhythm with first-degree AV block, frequent APCs, right bundle branch block and left axis deviation. Holter monitor on 7/1/2016 which showed sinus rhythm with frequent APCs and short atrial runs. Her echo back at that time showed normal ejection fraction, mild mitral regurgitation and Mitral valvular calcification without significant stenosis. She denies any prior history of CAD, myocardial infarction or heart failure. Echo on 09/24/2018: EF 70%. LV wall thickness is mildly increased. Moderate mitral valve sclerosis with no significant stenosis. Mean gradient 4.4 mmHg, Mild mitral regurg. Evidence of moderate (grade II) diastolic dysfunction seen. ECG done in office on 1/25/2019- Sinus Tachycardia with APCs and right bundle branch block. Echo on 01/25/2019: Is grossly unchanged from prior. Ms. Africa Chase is here due to worsening shortness of breath. She has a whispering voice due to her vocal cord paralysis. She is seen by ENT and currently doing speech therapy and think her voice is a little better. She reported occasional palpitations when she is short of breath. She does well at night with no breathing issues.  She chokes when she drinks if she is not careful, but denies choking when eating. Denies any chest pain, pressure or tightness. She was at [facility] three times and she had her lung drained. Also her cancer has come back. PET/CT scan showed evidence of recurrence with metastatic disease. She understands the nature of her metastatic breast cancer. She is willing to fight as much as she can. She has been told that the average survival is about 6 months. She may need to recurrent thoracentesis and/or permanent drain placement. She will start a new chemotherapy agent. She is here today to just make sure that her heart is not contributing to her shortness of breath. Past Medical History:    Past Medical History:   Diagnosis Date    Cancer ([facility] [address] 05/2015    right breast    Diabetes mellitus ([facility] [address]     Duodenal ulcer with hemorrhage     Esophageal hernia     H/O echocardiogram 06/28/2016    EF >65%. LV wall thickness is midly increased. Moderate mitral leaflet thickening with moderate posterior mitral valve leaflet prolapse. Mild mitral regurgitation. Moderate mitral valve stenosis mean gradient  of 6.4 mmHg. Moderate (ferade ll) diastolic dysfunction.  Hiatal hernia     History of echocardiogram 09/24/2018    EF 70%. LV wall thickness is mildly increased. Moderate mitral valve sclerosis w/ stenosis. Mean gradient 4.4. Mild mitral regurg. Evidence of moderate (grade II) diastolic dysfunction seen.  History of Holter monitoring 06/30/2016    Frequent PAC's with multiple short runs of what appears to be atrial fib.  including with RVR at times as fast as 160 bpm,    Hyperlipidemia     Hypertension     Macular degeneration     PONV (postoperative nausea and vomiting)     Stress fracture of lumbar vertebra     from coughing; dx last week     Past Surgical History:  Past Surgical History:   Procedure Laterality Date    BACK SURGERY  09/2013    cement fusion    BLADDER SUSPENSION      BREAST LUMPECTOMY Right 2015    Dr Lynette Granger, Lumpectomy    CATARACT REMOVAL       SECTION      x1    EYE SURGERY      OTHER SURGICAL HISTORY Right 10/21/15    I&D Rt. breast abcess    STOMACH SURGERY  2013    In [facility] gastric ulcer[facility]perfor[facility]repair    TUNNELED VENOUS PORT PLACEMENT Left 2015    UPPER GASTROINTESTINAL ENDOSCOPY      UPPER GASTROINTESTINAL ENDOSCOPY N/A 2018    -bx(normal)antral/duodenal deformity,hiatal hernia     Social History:    Social History     Tobacco Use   Smoking Status Former Smoker    Packs/day: 0.25    Years: 4.00    Pack years: 1.00    Last attempt to quit: 1958    Years since quittin.4   Smokeless Tobacco Never Used   Tobacco Comment    quit 60 years ago     Current Medications:  Outpatient Medications Marked as Taking for the 19 encounter (Office Visit) with Anahy Cowart MD   Medication Sig Dispense Refill    losartan (COZAAR) 100 MG tablet TAKE 1 TABLET BY MOUTH EVERY DAY 90 tablet 1    atorvastatin (LIPITOR) 40 MG tablet TAKE 1 TABLET BY MOUTH DAILY 90 tablet 1    metFORMIN (GLUCOPHAGE) 500 MG tablet TAKE 1 TABLET BY MOUTH TWICE A DAY WITH FOOD 90 tablet 1    diltiazem (CARDIZEM CD) 180 MG extended release capsule Take 1 capsule by mouth daily 90 capsule 1    omeprazole (PRILOSEC) 10 MG delayed release capsule Take 1 capsule by mouth daily 90 capsule 1    apixaban (ELIQUIS) 2.5 MG TABS tablet Take 1 tablet by mouth 2 times daily 180 tablet 3    fluticasone (FLONASE) 50 MCG/ACT nasal spray 1 spray by Nasal route daily (Patient taking differently: 1 spray by Nasal route daily as needed ) 1 Bottle 3    diphenhydrAMINE (BENADRYL) 25 MG tablet   Take 25 mg by mouth nightly as needed for Sleep       Multiple Vitamins-Minerals (THERAPEUTIC MULTIVITAMIN-MINERALS) tablet Take 1 tablet by mouth daily       acetaminophen (TYLENOL) 500 MG tablet Take 1,000 mg by mouth every 6 hours as needed.       Calcium Carbonate-Vitamin D (CALCIUM 600 + D PO) Take 1 tablet by mouth daily.  Multiple Vitamins-Minerals (OCUVITE PRESERVISION PO) Take  by mouth daily. REVIEW OF SYSTEMS:    CONSTITUTIONAL:+ weight loss, no fatigue, weakness, night sweats or fever. HEENT: No new vision difficulties or ringing in the ears. RESPIRATORY: See HPI  CARDIOVASCULAR: See HPI  GI: No nausea, vomiting, diarrhea, constipation, abdominal pain or changes in bowel habits. : No urinary frequency, urgency, incontinence hematuria or dysuria. SKIN: No cyanosis or skin lesions. MUSCULOSKELETAL: No new muscle or joint pain. NEUROLOGICAL: No syncope or TIA-like symptoms. PSYCHIATRIC: No anxiety, pain, insomnia or depression    Objective:     PHYSICAL EXAM:      VITALS:    /81 (Site: Left Lower Arm, Position: Sitting, Cuff Size: Medium Adult)   Pulse 80   Resp 22   Ht 5' (1.524 m)   Wt 150 lb (68 kg)   SpO2 95%   BMI 29.29 kg/m²   CONSTITUTIONAL: Cooperative, no apparent distress, and appears well nourished / developed. NEUROLOGIC:  Awake and orientated to person, place and time. HEENT: Sclera non-icteric, normocephalic, neck supple, no elevation of JVP, normal carotid pulses with no bruits and thyroid normal size. LUNGS:  [address] air entry bilaterally. No significant wheezes or crackles. CARDIOVASCULAR:  Regular rate and rhythm with no gallops, rubs, or abnormal heart sounds. The apical impulses not displaced. 2/6 ejection systolic murmur at the left upper sternal border without radiation. The carotid upstroke is normal in amplitude and contour without delay or bruit. JVP is not elevated. ABDOMEN:  Normal bowel sounds, non-distended and non-tender to palpation. EXT: Trace lower extremity edema. 1/2 way up to the knees bilaterally  No calf tenderness. Pulses are present bilaterally.       DATA:    Lab Results   Component Value Date    ALT 17 01/10/2019    AST 18 01/10/2019    ALKPHOS 67 01/10/2019    BILITOT <0.10 (L) 01/10/2019     Lab Results Component Value Date    CREATININE 1.49 (H) 01/15/2019    BUN 30 (H) 01/15/2019     01/15/2019    K 4.0 01/15/2019     01/15/2019    CO2 24 01/15/2019     Lab Results   Component Value Date    TSH 2.07 04/18/2014     Lab Results   Component Value Date    WBC 8.6 01/10/2019    HGB 9.5 (L) 01/10/2019    HCT 30.8 (L) 01/10/2019    MCV 95.4 01/10/2019     01/10/2019       Lab Results   Component Value Date    TRIG 196 (H) 02/26/2015    TRIG 165 (H) 10/22/2014    TRIG 265 (H) 04/18/2014     Lab Results   Component Value Date    HDL 44 02/26/2015    HDL 51 10/22/2014    HDL 49 04/18/2014     Lab Results   Component Value Date    LDLCHOLESTEROL 56 02/26/2015    LDLCHOLESTEROL 61 10/22/2014    LDLCHOLESTEROL 160 (H) 04/18/2014       Assessment:      Diagnosis Orders   1. SOB (shortness of breath)     2. Paroxysmal atrial fibrillation (HCC)     3. Mixed hyperlipidemia     4. Essential hypertension     5. Mitral valve disease       Plan:     · Shortness of breath with mild exertion:   I think her shortness of breath is multifactorial from vocal cord paralysis, age-related decline in lung function, multiple thoracic spine compression fractures causing exaggerated kyphosis and lung involvement in her malignancy.  No signs of volume overload. Heart rate controlled   Of course her cancer is making her weak over all. We also did discuss her chemo therapy pills and how they may effect her heart. Answered all of her and her daughters questions that they had at this time. She was told she had six months to live and to her she feels she is going to live longer than this and she is determined to.  Laboratory testing: Check CBC, basal metabolic panel and BNP    Additional Testing: I Ordered an Echocardiogram to assess Ms. Govea's ejection fraction and to look for significant valvular heart disease as a source of Ms. Govea symptoms    I also ordered a Holter monitor to assess adequacy of heart rate control. · Paroxysmal Atrial Fibrillation:     · Calcium Channel Blocker: Continue diltiazem 180 mg daily once daily. · ACE Inibitor/ARB: Continue losartan (Cozaar) 100 mg once daily. · Antiplatelet Agent: History of peptic ulcer not taking aspirin because of this. · Stroke Risk: Her CHADS2 score is greater than 2(2.2% stroke risk)  · Anticoagulation: Continue Apixaban (Eliquis) 2.5 mg every 12 hours. I also reminded her to watch for signs of blood in her stool or black tarry stools and stop the medication immediately if this develops as this could be life threatening. · Follow-up Holter monitor    · Hyperlipidemia: Mixed   · Statin Therapy: Continue atorvastatin (Lipitor) 40 mg nightly. Essential Hypertension: Controlled  · ACE Inibitor/ARB: Continue losartan (Cozaar) 100 mg once daily I discussed the potential side effects of this medication including lightheadedness and dizziness and told her to call the office if this occurs. · Calcium Channel Blocker: Continue [id number] mg daily. · Degenerative Mitral Valve Disease:   · Continue medication therapy as above. · Follow-up repeat echo. FOLLOW UP:  I told Ms. Govea to call my office if she had any problems, but otherwise told her to Return in about 3 months (around 8/30/2019). However, I would be happy to see her sooner should the need arise. Sincerely,  Vamsi Lin MD, F.A.C.C. [facility]    [address]  Phone: [phone], Fax: [phone]        I believe that the risk of significant morbidity and mortality related to the patient's current medical conditions are: intermediate-high. The documentation recorded by the scribe, accurately and completely reflects the services I personally performed and the decisions made by me. Vamsi Lin MD, F.A.C.C.  May 30, 2019

## 2019-06-05 NOTE — PROGRESS NOTES
Chief Complaint   Patient presents with    Treatment     discuss tx    Shortness of Breath     Pertinent clinical problems/treatment:    1. Right-sided breast cancer, T2 N1 M0, ER negative/WA negative/HER-2 negative  2. Imaging studies including PET scan, no distant metastatic disease  3. Neoadjuvant chemo, weekly carbotaxol, started  June 23, 2015. C2 on 7/14, C3 8/4/15, then stopped   4. S/p lumpectomy and node dissection:pT2N2, multiple positive lymph nodes, September 15, 2015  5. Adjuvant radiation   6. She developed metastatic disease with liver and lung metastasis as well as malignant pleural effusion  7. Plan to start palliative Xeloda started June 2019      Summary of the case    Ms. Antony Joseph is a very pleasant 80 y.o. female . She was doing fine until earlier this year, when she felt a mass int he upper outer quadrant of her right breast. The lump did not change after a few months, so she consulted with her doctor. Mammogram was done on 5/11/15 and showed large mass at the 10:00 position with spiculated margins. Ultrasound shortly followed and showed 3.4 cm mass at the 10:00 position with irregular margins. At least one large axillary node was appreciated. The patient did not have mammograms for many years before this. Biopsy was done and showed invasive ductal carcinoma, measuring at least 1.6 cm in the bx specimen. The tumor was ER/WA and HER 2 negative (triple negative ) . Clinical stage is T2N1M0. The patient underwent neoadjuvant chemotherapy, received 3 cycles, after that she underwent lumpectomy and node dissection. Pathologically the tumor was staged at T2 N2 M0. About 9 axillary lymph nodes were appreciated. After surgery, she had problem with tissue healing. She was managed conservatively. The wound slowly healed by secondary intention. After that we decided to proceed with radiation. And she was observed after that.   Workup suggested the presence of lung lesions that were in the range of 4-7 mm and those were followed conservatively  In early 2019, She developed aspiration pneumonia and workup showed vocal cord paralysis. CT scan of the head, neck and chest were done but there were done without contrast because of elevated creatinine. Those did not show any evidence of metastatic disease especially no evidence of mediastinal or neck masses that would explain have vocal cord paralysis. The patient continues to have worsening symptoms including worsening shortness of breath. She was found to have pleural effusion and liver metastases. Cytology of the pleural effusion showed metastatic breast cancer. The tumor was ER/AK and HER-2/marlon negative. Interim history:  The patient is here for follow-up breast cancer and a pulmonary nodule. She decided to go to [facility] for second opinion. She was seen there and they agreed with the treatment plan  She continues to have shortness of breath. She has some chest discomfort. She underwent pleural tap and that helped significantly with her symptoms. Her appetite is okay. She has no diarrhea. PAST MEDICAL HISTORY: has a past medical history of Cancer ([facility] [address] Diabetes mellitus ([address] Duodenal ulcer with hemorrhage, Esophageal hernia, H/O echocardiogram, Hiatal hernia, History of echocardiogram, History of Holter monitoring, Hyperlipidemia, Hypertension, Macular degeneration, PONV (postoperative nausea and vomiting), and Stress fracture of lumbar vertebra. PAST SURGICAL HISTORY: has a past surgical history that includes bladder suspension; Cataract removal; eye surgery;  section; back surgery (2013); Tunneled venous port placement (Left, 2015); Breast lumpectomy (Right, 2015); other surgical history (Right, 10/21/15); Stomach surgery (2013); Upper gastrointestinal endoscopy (); and Upper gastrointestinal endoscopy (N/A, 2018).    Detail about abd surgery: she had bleeding ulcer that eroded to an artery. She underwent surgery and took long time to recover. CURRENT MEDICATIONS:  has a current medication list which includes the following prescription(s): capecitabine, losartan, atorvastatin, metformin, diltiazem, omeprazole, apixaban, fluticasone, diphenhydramine, therapeutic multivitamin-minerals, acetaminophen, calcium carb-cholecalciferol, and multiple vitamins-minerals, and the following Facility-Administered Medications: sodium chloride (pf) and heparin flush. SOCIAL HISTORY:  reports that she quit smoking about 61 years ago. She has a 1.00 pack-year smoking history. She has never used smokeless tobacco. She reports that she does not drink alcohol or use drugs. REVIEW OF SYSTEMS:     General: No fever no chills, fatigue is about the same, new hoarseness of voice  ENT: No double vision, visual difficulty due to macular degeneration. no tinnitus or hearing problem,   hoarseness of voice as discussed  Respiratory: No chest pain,+ shortness of breath, no cough or hemoptysis. S/p right lumpectomy and node dissection on the right side, new pain and lesion in the left side as discussed  Cardiovascular: Denies chest pain, PND or orthopnea. No lower extremity swelling, no palpitations. Gastrointestinal:    Mild  Nausea, no  vomiting, abdominal pain, diarrhea or constipation. Genitourinary: Denies dysuria, hematuria, frequency, urgency or incontinence. Neurological: Denies headaches, decreased LOC, no sensory or motor focal deficits. Musculoskeletal:  No arthralgia , + back pain. Hematologic: no bleeding , no adenopathy. PHYSICAL EXAM:  The patient is not in acute distress. Vital signs: Blood pressure 127/85, pulse 103, temperature 99.2 °F (37.3 °C), temperature source Temporal, resp. rate 18, height 5' (1.524 m), weight 156 lb 9.6 oz (71 kg), not currently breastfeeding. HEENT:  Eyes are normal. Ears, nose and throat are normal.  Neck: Supple.   No lymph node enlargement. No thyroid enlargement. Trachea is centrally located. Chest:  Clear to auscultation. Decreased air entry in the right base with dullness. Breast: s/p right lumpectomy , Post radiation changes have improved significantly. No other masses or adenopathy are appreciated. .  Left breast is free of any masses or adenopathy or infection. Rt side: No palpable lesion appreciated. Possibly fullness in Rt axilla. She has a random soreness in that area. Heart: Irregular with frequent missed beats . Abdomen: Soft, nontender. No hepatosplenomegaly. No masses. Extremities:  + edema. Lymph Nodes:  No cervical, axillary or inguinal lymph node enlargement. Neurologic:  Conscious and oriented. No focal neurological deficits. Psychosocial: No depression, anxiety or stress. Skin: No rashes, bruises or ecchymoses. Review of Diagnostic data:    Ct scan  Impression   IMPRESSION:    1. New postoperative findings of the right breast and axilla   2. Tiny residual right axillary lymph node   3. Large hiatal hernia   4. Benign right renal cysts   5. Stable compression fracture T9 with methylmethacrylate augmentation     CT of the chest 5/4/2016  1. Slight decrease in size of the residual right axillary lymph node   2. Progressive but benign appearing pleural parenchymal thickening in both lung apices         Lab Results   Component Value Date    WBC 8.6 01/10/2019    HGB 9.5 (L) 01/10/2019    HCT 30.8 (L) 01/10/2019    MCV 95.4 01/10/2019     01/10/2019       Chemistry        Component Value Date/Time     01/15/2019 1013    K 4.0 01/15/2019 1013     01/15/2019 1013    CO2 24 01/15/2019 1013    BUN 30 (H) 01/15/2019 1013    CREATININE 1.49 (H) 01/15/2019 1013        Component Value Date/Time    CALCIUM 10.2 01/15/2019 1013    ALKPHOS 67 01/10/2019 0550    AST 18 01/10/2019 0550    ALT 17 01/10/2019 0550    BILITOT <0.10 (L) 01/10/2019 0550        Ct scan  1.  New 5 mm nodule along the right major fissure which is likely intrafissural lymph node. A 3 month follow-up CT is recommended. 2. Otherwise stable examination.         Mammogram     FINDINGS: Compared to 11/30/2017 and 11/28/2016. Yaron Sheikh left breast is heterogeneously dense. Stable benign scattered and vascular calcifications in the breast. No mammographic or sonographic abnormality at the site of tender palpation.       Ultrasound  FINDINGS: At the site of tenderness and palpation, no focal lesion is identified. There is normal fibroglandular tissue and underlying muscle. No mass lesion, fluid collection or lymphadenopathy. Chest CT scan September 27, 2018: Impression   1. Interval enlargement of the pulmonary nodule in the superior aspect of the   right lower lobe.  In the setting of known malignancy, this is concerning for   metastatic disease.  It is too small to be evaluated with PET-CT.  Tissue   sampling versus short interval follow-up in 3 months should be considered. 2. A 1.6 cm left thyroid lobe nodule is present.  Recommend thyroid US. The findings were sent to the Radiology Results [address] at 10:59   am on 9/27/2018to be communicated to a licensed caregiver.         Ct chest 1/19    Impression   1. New 4 mm solid noncalcified pulmonary nodule right upper lobe.  Additional   stable nodules are identified involving the right lung, largest measuring 7   mm within the right lower lobe.  Findings suggest progression of disease. 2. New trace right-sided pleural effusion.  Metastatic effusion cannot be   excluded.      PET/CT scan 5/1/19:  Impression   FDG avid metastatic disease is demonstrated with right supraclavicular lymph   node, mediastinal lymphadenopathy, right internal mammary lymph node and at   least 2 cm right hepatic lobe liver lesion.  Minimal FDG activity identified   in 7 mm right lower lobe nodule, also likely metastatic.       Recently identified 4 mm nodule in the right upper lobe on diagnostic chest   CT is not visible on this examination, likely too small to evaluate.       Moderate size right pleural effusion with low level FDG activity.  There is a   focal area of apparent thickening along the posterior inferior right   hemidiaphragm, which may represent developing pleural metastatic disease.               IMPRESSION:     1. Locally advanced triple negative right sided breast cancer, clinical stage is T2N1/N2M0. Wendy Kiser PET scan at diagnosis showed no evidence of distant metastatic disease but multiple lymph nodes. She is undergoing neoadjuvant chemotherapy in the form of weekly carbotaxol. He received 3 cycles of chemotherapy with very good clinical response. Ultrasound showing some shrinkage but does not reflect the true clinical response that I do appreciate by clinical exam.  Patient is getting more fatigue from chemotherapy. We feel that moving to surgery would be the right answer for the patient. Surgery was done around 16 September 2015 and showed 2.2 cm residual cancer as well as multiple lymph nodes, around 9 that are positive  2. Underwent adjuvant radiation. 3. Advanced age and multiple comorbidities   4. Atrial fibrillation, treated with anticoagulation and rate control   5. Axillary Lymphadenopathy. 6. Undergoing surveillance, Repeat CT scan will be ordered  7. Small fullness in Rt axilla. The lesion is questionable. 8. Lung nodule, currently 7 mm  9. Recent vocal cord paralysis. 10. Evidence of cancer recurrence with metastatic disease on PET/CT scan 5/1/19  11. Plan palliative Xeloda          PLAN:   I had a long discussion with the patient, we discussed pros and cons of starting her on therapy. The patient tumor was positive for androgen so anti-androgen are feasible but I think standard of care is to proceed with treatment with Xeloda. She is agreeable to that and have doctors at [place] also agree with that.   With that I feel should be started as soon as possible. I will limit her dose at 1500 mg twice daily for 7 days on and then 7 days off due to her age and comorbidities. We will adjust the dose according to tolerance and efficacy. Side effects were explained in details.   We will see her after she starts the medication                             BIA ROBERTS [facility] MD [place]  Hematologist/Medical Oncologist  Cell: [phone]

## 2019-06-05 NOTE — PATIENT INSTRUCTIONS
Return visit after starting Xeloda, need CBC, CMP, CA 27-29 before she started the medication and day 1 of each cycle

## 2019-06-06 NOTE — PROGRESS NOTES
Phone: [address]    Fax: [phone]                                 Outpatient Speech Therapy                               DAILY TREATMENT NOTE    Date: 6/6/2019  Patients Name:  Dang Can  YOB: 1934 (80 y.o.)  Gender:  female  MRN:  [id number]  [facility] #: [id number]  Referring physician:Facundo [address] [place] [address] Insurance Information: Medicare       Total # of Visits to Date: 25   No Show: 0   Canceled Appointment: 1   Current Authorization        PAIN  [x]No     []Yes      Pain Rating (0-10 pain scale): 0  Location:  N/A  Pain Description:  NA    SUBJECTIVE  Patient presents to clinic with self on-time. SHORT TERM GOALS/ TREATMENT SESSION:  Subjective report:          Patient will start chemo therapy soon. Reports she is very short of breath this date and has not been getting much sleep. Patient's family members and friends have noticed improvement. She also reports her voice sounds \"normal.\"     Goal 1: Patient will complete vocal adduction and breath support exercises with minimal verbal cues. Patient completed 15x each with minimal verbal cues. Continued decreased breath support required frequent breaks between each exercises. Average /s/ phonation of 5 seconds. [x]Met  []Partially met  []Not met   Goal 2: Patient will utilize compensatory strategies (pacing, breath support, etc) in conversation with 80% accuracy given minimal verbal cues. Utilized 70% accuracy with mod cues. Mostly due to increased SOB this date. []Met  [x]Partially met  []Not met   Goal 3: Patient will sustain phonation for 5 seconds given minimal verbal cues. Patient sustained phonation for > 1 second   []Met  [x]Partially met  []Not met   Goal 4: Patient will complete oropharyngeal exercises 15x each following clinician model to improve pharyngeal functioning.  Patient completed oropharyngeal exercises 15x each independently. Included tongue base retraction exercises, Courtney, Mendelsohn, Effortful Swallow, and supraglottic Swallow. Frequent breaks required during tasks. [x]Met  []Partially met  []Not met   Goal 5: Patient will utilize compensatory strategies during a meal/snack with minimal cues in 80% of opportunities. Immediate cough with thin liquid via straw x 1 Discussed likely aspiration and possible need for thickened-liquids. []Met  [x]Partially met  []Not met     LONG TERM GOALS/ TREATMENT SESSION:  Goal 1: Patient will improve vocal quality for communication for daily needs. Goal progressing. See STG data   []Met  []Partially met  []Not met   Goal 2: Patient will independently utilize compensatory strategies during a meal/snack across 2 consecutive sessions. Goal progressing.  See STG data     []Met  []Partially met  []Not met       EDUCATION/HOME EXERCISE PROGRAM (HEP)  New Education/HEP provided to patient/family/caregiver:    []Yes:     [x]No (Continued review of prior education)   If yes Education Provided:     Method of Education:     [x]Discussion     []Demonstration    [] Written     []Other  Evaluation of Patients Response to Education:         [x]Patient and or caregiver verbalized understanding  []Patient and or Caregiver Demonstrated without assistance   []Patient and or Caregiver Demonstrated with assistance  []Needs additional instruction to demonstrate understanding of education    ASSESSMENT  Patient tolerated todays treatment session:    [x] Good   []  Fair   []  Poor  Limitations/difficulties with treatment session due to:   []Pain     []Fatigue     []Other medical complications     []Other    Comments:    PLAN  [x]Continue with current plan of care  [][facility]  []IHold per patient request  [] Change Treatment plan:  [] Insurance hold  __ Other     TIME   Time Treatment session was INITIATED 1315   Time Treatment session was STOPPED 1400    45     Charges: 1  Electronically signed by: Sandee Silva M.S. CCC-SLP              Date:6/6/2019

## 2019-06-13 NOTE — PROGRESS NOTES
Phone: [address]    Fax: [phone]                                 Outpatient Speech Therapy                               DAILY TREATMENT NOTE    Date: 6/13/2019  Patients Name:  Michael Velasquez  YOB: 1934 (80 y.o.)  Gender:  female  MRN:  [id number]  [facility] #: [id number]  Referring Shan Najjar C       INSURANCE  SLP Insurance Information: Medicare       Total # of Visits to Date: 23   No Show: 0   Canceled Appointment: 1   Current Authorization  Comments: 0     PAIN  [x]No     []Yes      Pain Rating (0-10 pain scale): 0  Location:  N/A  Pain Description:  NA    SUBJECTIVE  Patient presents to clinic with self. SHORT TERM GOALS/ TREATMENT SESSION:  Subjective report:           Patient reports coughing up 1/2 cup of clear liquid the other day. Patient describes it like \"the liquid you cough up when you are sick, only it's clear. \" No further concerns. Goal 1: Patient will complete vocal adduction and breath support exercises with minimal verbal cues. Patient completed 15x each with minimal verbal cues. Continued decreased breath support required frequent breaks between each exercise. Patient with more raspy vocal quality noted this date. Average /s/ phonation of 7 seconds. Maximum of 9.5. Increased difficulty with lower pitches this date. Cues to breath in between high-low, low-high improve vocal quality. []Met  [x]Partially met  []Not met   Goal 2: Patient will utilize compensatory strategies (pacing, breath support, etc) in conversation with 80% accuracy given minimal verbal cues. Utilized 70% accuracy with mod cues. []Met  [x]Partially met  []Not met   Goal 3: Patient will sustain phonation for 5 seconds given minimal verbal cues.        Patient sustained phonation for > 1 second []Met  [x]Partially met  []Not met   Goal 4: Patient will complete oropharyngeal exercises 15x each following clinician model to improve pharyngeal functioning. Patient completed oropharyngeal exercises 15x each independently. Included tongue base retraction exercises, Courtney, Mendelsohn, Effortful Swallow, and supraglottic Swallow. Frequent breaks required during tasks. []Met  []Partially met  []Not met   Goal 5: Patient will utilize compensatory strategies during a meal/snack with minimal cues in 80% of opportunities. Immediate cough with thin liquids via straw 2/4x. Discussed possibility for nectar-thick liquids intermittently throughout day due to upcoming chemo treatments and possibility of fatigue. Patient continues to insist that she does not have trouble at home, but just does not drink that much. Patient educated on importance of water for hydration and rehabilitation purposes. []Met  [x]Partially met  []Not met     LONG TERM GOALS/ TREATMENT SESSION:  Goal 1: Patient will improve vocal quality for communication for daily needs. Goal progressing. See STG data   []Met  [x]Partially met  []Not met   Goal 2: Patient will independently utilize compensatory strategies during a meal/snack across 2 consecutive sessions. Goal progressing.  See STG data         []Met  [x]Partially met  []Not met       EDUCATION/HOME EXERCISE PROGRAM (HEP)  New Education/HEP provided to patient/family/caregiver:    []Yes:     [x]No (Continued review of prior education)   If yes Education Provided:     Method of Education:     [x]Discussion     []Demonstration    [] Written     []Other  Evaluation of Patients Response to Education:         [x]Patient and or caregiver verbalized understanding  []Patient and or Caregiver Demonstrated without assistance   []Patient and or Caregiver Demonstrated with assistance  []Needs additional instruction to demonstrate understanding of education    ASSESSMENT  Patient tolerated todays treatment session:    [x] Good   []  Fair   []  Poor  Limitations/difficulties with treatment session due to:   []Pain     []Fatigue []Other medical complications     []Other    Comments:    PLAN  [x]Continue with current plan of care  [][facility]  []IHold per patient request  [] Change Treatment plan:  [] Insurance hold  __ Other     TIME   Time Treatment session was INITIATED 1300   Time Treatment session was STOPPED 1345    45     Charges: 1  Electronically signed by:    Haritha Bales              Date:6/13/2019

## 2019-06-17 NOTE — TELEPHONE ENCOUNTER
Last OV: 4/25/2019  Last RX:   Next scheduled apt: 10/24/2019, [facility] #90 pending Daniel's signature.

## 2019-06-18 NOTE — TELEPHONE ENCOUNTER
----- Message from Elio Santillan MD sent at 6/17/2019  7:12 PM EDT -----  Kidney function and blood count are okay.  Thank you

## 2019-06-18 NOTE — TELEPHONE ENCOUNTER
Upon leaving results with pt daughter, Kaykay Gudino, she wanted you to know that pt is starting chemotherapy.

## 2019-06-18 NOTE — TELEPHONE ENCOUNTER
----- Message from Jona Howard MD sent at 6/17/2019  7:19 PM EDT -----  Blood work is ok.  Thank you

## 2019-06-18 NOTE — TELEPHONE ENCOUNTER
Spoke to pt daughter, Андрей Rossi, and let her know her test results. She verbalized understanding.

## 2019-06-18 NOTE — TELEPHONE ENCOUNTER
----- Message from Ary Marte MD sent at 6/17/2019  7:19 PM EDT -----  Echo is Ok, no change from prior.  Thank you

## 2019-06-27 NOTE — PROGRESS NOTES
Phone: [address]    Fax: [phone]                                 Outpatient Speech Therapy                               DAILY TREATMENT NOTE    Date: 6/27/2019  Patients Name:  Paola Hayes  YOB: 1934 (80 y.o.)  Gender:  female  MRN:  [id number]  [facility] #: [id number]  Referring physician:Facundo [address] [place] [address] Insurance Information: Medicare       Total # of Visits to Date: 20   No Show: 0   Canceled Appointment: 2   Current Authorization  Comments: 0     PAIN  [x]No     []Yes      Pain Rating (0-10 pain scale): 0  Location:  N/A  Pain Description:  NA    SUBJECTIVE  Patient presents to clinic with self. SHORT TERM GOALS/ TREATMENT SESSION:  Subjective report:          Patient reports that swallowing is becoming an increasing difficulty; however, she does not wish to drink nectar-thickened liquids. ST informed patient of risk of pneumonia from aspiration and compromised immune system from chemotherapy. Patient expressed understanding. Goal 1: Patient will complete vocal adduction and breath support exercises with minimal verbal cues. Patient completed 15x each with minimal verbal cues. Continued decreased breath support required frequent breaks between each exercise. Patient with more raspy vocal quality noted this date. Noted vocal loudness decrease      Increased difficulty with lower pitches this date. Cues to breath in between high-low, low-high improve vocal quality.      []Met  [x]Partially met  []Not met   Goal 2: Patient will utilize compensatory strategies (pacing, breath support, etc) in conversation with 80% accuracy given minimal verbal cues. Patient utilized strategies during conversation with 75% []Met  [x]Partially met  []Not met   Goal 3: Patient will sustain phonation for 5 seconds given minimal verbal cues. Patient sustaining phonation for 1.2 seconds maximum.    []Met  [x]Partially met  []Not met   Goal 4: Patient will complete oropharyngeal exercises 15x each following clinician model to improve pharyngeal functioning. Courtney x 15  Tongue Pull backs x 30  Mendelsohn Maneuver 15  Effortful Swallow x 15     Independently. Continue to improve swallow function. [x]Met  []Partially met  []Not met   Goal 5: Patient will utilize compensatory strategies during a meal/snack with minimal cues in 80% of opportunities. Drank thin liquids via straw x 4 with no overt s/sx of aspiration with  []Met  [x]Partially met  []Not met     LONG TERM GOALS/ TREATMENT SESSION:  Goal 1: Patient will improve vocal quality for communication for daily needs. Goal progressing. See STG data   []Met  [x]Partially met  []Not met   Goal 2: Patient will independently utilize compensatory strategies during a meal/snack across 2 consecutive sessions. Goal progressing.  See STG data         []Met  [x]Partially met  []Not met       EDUCATION/HOME EXERCISE PROGRAM (HEP)  New Education/HEP provided to patient/family/caregiver:    []Yes:     [x]No (Continued review of prior education)   If yes Education Provided:     Method of Education:     [x]Discussion     []Demonstration    [] Written     []Other  Evaluation of Patients Response to Education:         []Patient and or caregiver verbalized understanding  []Patient and or Caregiver Demonstrated without assistance   []Patient and or Caregiver Demonstrated with assistance  []Needs additional instruction to demonstrate understanding of education    ASSESSMENT  Patient tolerated todays treatment session:    [x] Good   []  Fair   []  Poor  Limitations/difficulties with treatment session due to:   []Pain     []Fatigue     []Other medical complications     []Other    Comments:    PLAN  [x]Continue with current plan of care  [][facility]  []IHold per patient request  [] Change Treatment plan:  [] Insurance hold  __ Other     TIME   Time Treatment session was INITIATED 1300   Time Treatment session was STOPPED 1345    45     Charges: 1  Electronically signed by:    Toby Mejia M.S. CCC-SLP              Date:6/27/2019

## 2019-07-03 NOTE — PROGRESS NOTES
06/17/2019 1400    CO2 23 06/17/2019 1400    BUN 20 06/17/2019 1400    CREATININE 1.54 (H) 06/17/2019 1400        Component Value Date/Time    CALCIUM 9.5 06/17/2019 1400    ALKPHOS 86 06/17/2019 1400    AST 25 06/17/2019 1400    ALT 13 06/17/2019 1400    BILITOT 0.37 06/17/2019 1400        Ct scan  1. New 5 mm nodule along the right major fissure which is likely intrafissural lymph node. A 3 month follow-up CT is recommended. 2. Otherwise stable examination.         Mammogram     FINDINGS: Compared to 11/30/2017 and 11/28/2016. Johnnie Sawant left breast is heterogeneously dense. Stable benign scattered and vascular calcifications in the breast. No mammographic or sonographic abnormality at the site of tender palpation.       Ultrasound  FINDINGS: At the site of tenderness and palpation, no focal lesion is identified. There is normal fibroglandular tissue and underlying muscle. No mass lesion, fluid collection or lymphadenopathy. Chest CT scan September 27, 2018: Impression   1. Interval enlargement of the pulmonary nodule in the superior aspect of the   right lower lobe.  In the setting of known malignancy, this is concerning for   metastatic disease.  It is too small to be evaluated with PET-CT.  Tissue   sampling versus short interval follow-up in 3 months should be considered. 2. A 1.6 cm left thyroid lobe nodule is present.  Recommend thyroid US. The findings were sent to the Radiology Results [address] at 10:59   am on 9/27/2018to be communicated to a licensed caregiver.         Ct chest 1/19    Impression   1. New 4 mm solid noncalcified pulmonary nodule right upper lobe.  Additional   stable nodules are identified involving the right lung, largest measuring 7   mm within the right lower lobe.  Findings suggest progression of disease. 2. New trace right-sided pleural effusion.  Metastatic effusion cannot be   excluded.      PET/CT scan 5/1/19:  Impression   FDG avid metastatic disease is

## 2019-07-03 NOTE — PATIENT INSTRUCTIONS
Continue Xeloda  Zofran prescription  norco prescription  RV 3-4 weeks with labs at [facility]

## 2019-07-13 NOTE — ED PROVIDER NOTES
tablet TAKE 1 TABLET BY MOUTH DAILY 90 tablet 1    metFORMIN (GLUCOPHAGE) 500 MG tablet TAKE 1 TABLET BY MOUTH TWICE A DAY WITH FOOD 90 tablet 1    diltiazem (CARDIZEM CD) 180 MG extended release capsule Take 1 capsule by mouth daily 90 capsule 1    omeprazole (PRILOSEC) 10 MG delayed release capsule Take 1 capsule by mouth daily 90 capsule 1    apixaban (ELIQUIS) 2.5 MG TABS tablet Take 1 tablet by mouth 2 times daily 180 tablet 3    diphenhydrAMINE (BENADRYL) 25 MG tablet   Take 25 mg by mouth nightly as needed for Sleep       Multiple Vitamins-Minerals (THERAPEUTIC MULTIVITAMIN-MINERALS) tablet Take 1 tablet by mouth daily       acetaminophen (TYLENOL) 500 MG tablet Take 1,000 mg by mouth every 6 hours as needed.  Calcium Carbonate-Vitamin D (CALCIUM 600 + D PO) Take 1 tablet by mouth daily.  Multiple Vitamins-Minerals (OCUVITE PRESERVISION PO) Take  by mouth daily.  fluticasone (FLONASE) 50 MCG/ACT nasal spray 1 spray by Nasal route daily (Patient taking differently: 1 spray by Nasal route daily as needed ) 1 Bottle 3       ALLERGIES   Allergies   Allergen Reactions    Aspartame And Phenylalanine     Influenza Vaccines      Makes her very sick    Other      Pt states allergic to flu shots makes her sick   Patient states is allergic to Nutrasweet       FAMILY OR SOCIAL HISTORY   Family History   Problem Relation Age of Onset    Osteoporosis Mother     Heart Disease Mother     Macular Degen Father     Heart Disease Father      Social History     Socioeconomic History    Marital status:       Spouse name: Not on file    Number of children: Not on file    Years of education: Not on file    Highest education level: Not on file   Occupational History    Not on file   Social Needs    Financial resource strain: Not on file    Food insecurity:     Worry: Not on file     Inability: Not on file    Transportation needs:     Medical: Not on file     Non-medical: Not on file Tobacco Use    Smoking status: Former Smoker     Packs/day: 0.25     Years: 4.00     Pack years: 1.00     Last attempt to quit: 1958     Years since quittin.5    Smokeless tobacco: Never Used    Tobacco comment: quit 60 years ago   Substance and Sexual Activity    Alcohol use: No    Drug use: No    Sexual activity: Not on file   Lifestyle    Physical activity:     Days per week: Not on file     Minutes per session: Not on file    Stress: Not on file   Relationships    Social connections:     Talks on phone: Not on file     Gets together: Not on file     Attends Faith service: Not on file     Active member of club or organization: Not on file     Attends meetings of clubs or organizations: Not on file     Relationship status: Not on file    Intimate partner violence:     Fear of current or ex partner: Not on file     Emotionally abused: Not on file     Physically abused: Not on file     Forced sexual activity: Not on file   Other Topics Concern    Not on file   Social History Narrative    Not on file         PHYSICAL EXAM   VITAL SIGNS: BP (!) 103/55   Pulse 86   Resp 20   Ht 5' (1.524 m)   Wt 148 lb (67.1 kg)   SpO2 99%   BMI 28.90 kg/m²   Constitutional: Appears generally depleted  Eyes: Pupils equally round and reactive to light, sclera nonicteric  HENT: atraumatic, dry mucous membranes  NECK: Normal range of motion, no JVD  Respiratory: No respiratory distress, normal breath sounds, no wheezing   Cardiovascular: irregular rhythm and tachy rate  GI: Soft, nondistended, nontender  Musculoskeletal: No edema, no acute deformities   Integument: Skin is warm and dry, no obvious rash   Vascular: Radial pulses 2+ equal bilaterally  Neurologic: awake, alert, oriented x3 normal finger to nose test bilaterally.   Psychiatric: pleasant affect, does not appear anxious      Tele: showed a fib rate initally 130  To 140, and after fluids 80 to 110    RADIOLOGY/PROCEDURES   XR CHEST PORTABLE

## 2019-07-15 PROBLEM — I48.91 ATRIAL FIBRILLATION WITH RVR (HCC): Status: ACTIVE | Noted: 2019-01-01

## 2019-07-15 PROBLEM — N17.9 AKI (ACUTE KIDNEY INJURY) (HCC): Status: ACTIVE | Noted: 2019-01-01

## 2019-07-15 PROBLEM — N18.30 ACUTE RENAL FAILURE SUPERIMPOSED ON STAGE 3 CHRONIC KIDNEY DISEASE (HCC): Status: ACTIVE | Noted: 2019-01-01

## 2019-07-15 PROBLEM — E87.6 HYPOKALEMIA: Status: ACTIVE | Noted: 2019-01-01

## 2019-07-15 PROBLEM — E83.42 HYPOMAGNESEMIA: Status: ACTIVE | Noted: 2019-01-01

## 2019-07-15 NOTE — CONSULTS
Value Date    CREATININE 2.00 (H) 07/15/2019    BUN 52 (H) 07/15/2019     07/15/2019    K 3.1 (L) 07/15/2019     07/15/2019    CO2 20 07/15/2019     Lab Results   Component Value Date    TSH 4.26 07/15/2019     Lab Results   Component Value Date    WBC 4.4 07/15/2019    HGB 9.3 (L) 07/15/2019    HCT 29.4 (L) 07/15/2019    MCV 92.7 07/15/2019     07/15/2019       Lab Results   Component Value Date    TRIG 196 (H) 02/26/2015    TRIG 165 (H) 10/22/2014    TRIG 265 (H) 04/18/2014     Lab Results   Component Value Date    HDL 44 02/26/2015    HDL 51 10/22/2014    HDL 49 04/18/2014     Lab Results   Component Value Date    LDLCHOLESTEROL 56 02/26/2015    LDLCHOLESTEROL 61 10/22/2014    LDLCHOLESTEROL 160 (H) 04/18/2014       Assessment:   Paroxysmal atrial fibrillation. Chronic anticoagulation. Acute on chronic renal failure. Metastatic breast cancer. Electrolyte abnormalities. Plan:     · Paroxysmal Atrial Fibrillation:     · Calcium Channel Blocker: Continue diltiazem 180 mg daily once daily. · Antiplatelet Agent: History of peptic ulcer not taking aspirin because of this. · Stroke Risk: Her CHADS2 score is greater than 2(2.2% stroke risk)  · Anticoagulation: Continue Apixaban (Eliquis) 2.5 mg every 12 hours. I also reminded her to watch for signs of blood in her stool or black tarry stools and stop the medication immediately if this develops as this could be life threatening. · Hyperlipidemia: Mixed   · Statin Therapy: Continue atorvastatin (Lipitor) 40 mg nightly. Essential Hypertension: Controlled  · ACE Inibitor/ARB: Losartan on hold because of acute renal failure. · Calcium Channel Blocker: Continue [id number] mg daily. · Degenerative Mitral Valve Disease:   · Continue medication therapy as above. · Continue intravenous hydration and electrolyte replenishment. · We will closely follow up.     Metastatic breast cancer: I think her current problem is secondary to

## 2019-07-15 NOTE — TELEPHONE ENCOUNTER
Patient's daughter called to discuss mother's condition. Patient is not eating/drinking or getting around much. Complains of shortness of breath with activity. Is not able to swallow liquids well. Also having diarrhea which she took Imodium for and it helped. Daughter concerned about decline. Patient has refused before to accept assistance as she believes she is doing OK. Was seen in ER over weekend and refused to be admitted. Discussed options of home health, physical therapy, palliative care, or hospice. She will check on mother and if condition not improved she will contact cardiology for elevated HR and than return to ER to be evaluated. Support given. Contact if needs assistance.

## 2019-07-15 NOTE — H&P
ICU Admission [facility] Medicine  History and Physical    Patient:  Taiwo Matos  MRN: [id number]    Chief Complaint:  weakness    History Obtained From:  patient, electronic medical record    PCP: Compa Thomas MD    History of Present Illness: The patient is a 80 y.o. female who presents with weakness and worsening generalized fatigue. She was seen in ER 7/13/19 as well. During that visit she was given IVF for afib with RVR and discharged to home as she did to want to be admitted at the time. She returned to ER 7/15/19 with continuing to worsen fatigue and poor po intake. She had instances of diarrhea as well. She has a recurrence of breast CA with mets. She had been restarted on oral palliative chemo 3-4 weeks ago and has had very poor oral intake subsequently. She has h/o afib, DM, CKD, dysphagia. In the ER she was tachycardic. Magnesium was 1.1. Potassium was 3.1. Cr 2.0 (up from baseline). She was admitted for acute on chronic kidney injury likely due to oral chemo/lack of po intake, hypoMg, hypoK, recurrence of breast CA, afib with RVR. Past Medical History:        Diagnosis Date    Cancer [facility]) 05/2015    right breast    Diabetes mellitus ([facility]     Duodenal ulcer with hemorrhage     Esophageal hernia     H/O echocardiogram 06/28/2016    EF >65%. LV wall thickness is midly increased. Moderate mitral leaflet thickening with moderate posterior mitral valve leaflet prolapse. Mild mitral regurgitation. Moderate mitral valve stenosis mean gradient  of 6.4 mmHg. Moderate (ferade ll) diastolic dysfunction.  Hiatal hernia     History of echocardiogram 09/24/2018    EF 70%. LV wall thickness is mildly increased. Moderate mitral valve sclerosis w/ stenosis. Mean gradient 4.4. Mild mitral regurg. Evidence of moderate (grade II) diastolic dysfunction seen.  History of Holter monitoring 06/30/2016    Frequent PAC's with multiple short runs of what appears to be atrial fib.  including with RVR at 01/07/2019    Hypoxia 01/07/2019    Nausea 10/26/2018    Pure hypercholesterolemia 10/25/2018    Duodenal ulcer with hemorrhage     Lung nodule 09/12/2018    Paroxysmal atrial fibrillation ([facility] 08/17/2017    Malignant neoplasm of upper-outer quadrant of female breast ([facility] 06/10/2015    Dysphagia 11/26/2013    History of duodenal ulcer 08/10/2013    Bilateral atelectasis 08/09/2013    Bilateral pleural effusion 08/09/2013    Thoracic vertebral fracture (T9 fracture) 08/09/2013    DM type 2 (diabetes mellitus, type 2) ([facility] 08/09/2013    Essential hypertension 08/09/2013    Duodenal ulcer s/p surgery 08/09/2013    Fall involving wheelchair causing accidental injury 08/09/2013    Abdominal pain, other specified site 08/09/2013    Anemia 08/09/2013       Plan:     · This patient requires inpatient ICU admission because of acute on chronic kidney injury likely due to oral chemo/lack of po intake, hypoMg, hypoK, recurrence of breast CA, afib with RVR. · Factors affecting the medical complexity of this patient include  afib, DM, CKD, dysphagia, breast CA  · Estimated length of stay is 3 days  · Replace Mg and K  · Repeat labs  · IVF  · Cardiology consult  · Rate control    High risk medications:none    I spent 32 minutes providing critical care management to this patient. This excludes time spent in performing separately billed procedures    CORE MEASURES  DVT prophylaxis: already on chronic anticoagulation  Decubitus ulcer present on admission: No  CODE STATUS: FULL CODE  Nutrition Status: poor  Physical therapy: No   Old Charts reviewed: Yes  EKG Reviewed: Yes  Advance Directive Addressed:  Yes    Luis Armando Piper PA-C  7/15/2019, 4:31 PM

## 2019-07-16 PROBLEM — E44.0 MODERATE MALNUTRITION (HCC): Status: ACTIVE | Noted: 2019-01-01

## 2019-07-16 NOTE — PROGRESS NOTES
2018    -bx(normal)antral/duodenal deformity,hiatal hernia     Social History:    Social History     Tobacco Use   Smoking Status Former Smoker    Packs/day: 0.25    Years: 4.00    Pack years: 1.00    Last attempt to quit: 1958    Years since quittin.5   Smokeless Tobacco Never Used   Tobacco Comment    quit 60 years ago     Current Medications:  Outpatient Medications Marked as Taking for the 7/15/19 encounter [facility] Encounter)   Medication Sig Dispense Refill    HYDROcodone-acetaminophen (NORCO) 5-325 MG per tablet Take 1 tablet by mouth every 6 hours as needed for Pain for up to 30 days. 60 tablet 0    prochlorperazine (COMPAZINE) 10 MG tablet Take 1 tablet by mouth every 6 hours as needed (nausea) 30 tablet 3    losartan (COZAAR) 100 MG tablet TAKE 1 TABLET BY MOUTH EVERY DAY 90 tablet 1    atorvastatin (LIPITOR) 40 MG tablet TAKE 1 TABLET BY MOUTH DAILY 90 tablet 1    metFORMIN (GLUCOPHAGE) 500 MG tablet TAKE 1 TABLET BY MOUTH TWICE A DAY WITH FOOD 90 tablet 1    diltiazem (CARDIZEM CD) 180 MG extended release capsule Take 1 capsule by mouth daily 90 capsule 1    omeprazole (PRILOSEC) 10 MG delayed release capsule Take 1 capsule by mouth daily 90 capsule 1    apixaban (ELIQUIS) 2.5 MG TABS tablet Take 1 tablet by mouth 2 times daily 180 tablet 3    diphenhydrAMINE (BENADRYL) 25 MG tablet   Take 25 mg by mouth nightly as needed for Sleep       Multiple Vitamins-Minerals (THERAPEUTIC MULTIVITAMIN-MINERALS) tablet Take 1 tablet by mouth daily       Calcium Carbonate-Vitamin D (CALCIUM 600 + D PO) Take 1 tablet by mouth daily.  Multiple Vitamins-Minerals (OCUVITE PRESERVISION PO) Take  by mouth daily.         apixaban  2.5 mg Oral BID    atorvastatin  40 mg Oral Daily    diltiazem  180 mg Oral Daily    sodium chloride flush  10 mL Intravenous 2 times per day    famotidine  20 mg Oral Daily    insulin lispro  0-12 Units Subcutaneous TID WC    insulin lispro  0-6 Units Subcutaneous Nightly    cefTRIAXone (ROCEPHIN) IV  1 g Intravenous Q24H       REVIEW OF SYSTEMS:    CONSTITUTIONAL:+ weight loss, no fatigue, weakness, night sweats or fever. HEENT: No new vision difficulties or ringing in the ears. RESPIRATORY: See HPI  CARDIOVASCULAR: See HPI  GI: No nausea, vomiting, diarrhea, constipation, abdominal pain or changes in bowel habits. : No urinary frequency, urgency, incontinence hematuria or dysuria. SKIN: No cyanosis or skin lesions. MUSCULOSKELETAL: No new muscle or joint pain. NEUROLOGICAL: No syncope or TIA-like symptoms. PSYCHIATRIC: No anxiety, pain, insomnia or depression    Objective:     PHYSICAL EXAM:      VITALS:    BP (!) 112/58   Pulse 79   Temp 97.5 °F (36.4 °C) (Temporal)   Resp 18   Ht 5' (1.524 m)   Wt 155 lb 8 oz (70.5 kg)   SpO2 98%   BMI 30.37 kg/m²   CONSTITUTIONAL: Cooperative, no apparent distress, and appears well nourished / developed. NEUROLOGIC:  Awake and orientated to person, place and time. HEENT: Sclera non-icteric, normocephalic, neck supple, no elevation of JVP, normal carotid pulses with no bruits and thyroid normal size. LUNGS:  [address] air entry bilaterally. No significant wheezes or crackles. CARDIOVASCULAR:  Regular rate and rhythm with no gallops, rubs, or abnormal heart sounds. The apical impulses not displaced. 2/6 ejection systolic murmur at the left upper sternal border without radiation. The carotid upstroke is normal in amplitude and contour without delay or bruit. JVP is not elevated. ABDOMEN:  Normal bowel sounds, non-distended and non-tender to palpation. EXT: Trace lower extremity edema. 1/2 way up to the knees bilaterally  No calf tenderness. Pulses are present bilaterally.       DATA:    Lab Results   Component Value Date    ALT 12 07/16/2019    AST 16 07/16/2019    ALKPHOS 63 07/16/2019    BILITOT 0.32 07/16/2019     Lab Results   Component Value Date    CREATININE 1.60 (H) 07/16/2019    BUN 43 (H) 07/16/2019     07/16/2019    K 3.5 (L) 07/16/2019     07/16/2019    CO2 19 (L) 07/16/2019     Lab Results   Component Value Date    TSH 4.26 07/15/2019     Lab Results   Component Value Date    WBC 4.3 07/16/2019    HGB 8.4 (L) 07/16/2019    HCT 26.9 (L) 07/16/2019    MCV 94.4 07/16/2019     07/16/2019       Lab Results   Component Value Date    TRIG 196 (H) 02/26/2015    TRIG 165 (H) 10/22/2014    TRIG 265 (H) 04/18/2014     Lab Results   Component Value Date    HDL 44 02/26/2015    HDL 51 10/22/2014    HDL 49 04/18/2014     Lab Results   Component Value Date    LDLCHOLESTEROL 56 02/26/2015    LDLCHOLESTEROL 61 10/22/2014    LDLCHOLESTEROL 160 (H) 04/18/2014       Assessment:   Paroxysmal atrial fibrillation. Chronic anticoagulation. Acute on chronic renal failure. Metastatic breast cancer. Electrolyte abnormalities. Plan:     · Paroxysmal Atrial Fibrillation:     · Calcium Channel Blocker: Continue diltiazem 180 mg daily once daily. · Antiplatelet Agent: History of peptic ulcer not taking aspirin because of this. · Stroke Risk: Her CHADS2 score is greater than 2(2.2% stroke risk)  · Anticoagulation: Continue Apixaban (Eliquis) 2.5 mg every 12 hours. I also reminded her to watch for signs of blood in her stool or black tarry stools and stop the medication immediately if this develops as this could be life threatening. · Hyperlipidemia: Mixed   · Statin Therapy: Continue atorvastatin (Lipitor) 40 mg nightly. Essential Hypertension: Controlled  · ACE Inibitor/ARB: Losartan on hold because of acute renal failure. · Calcium Channel Blocker: Continue [id number] mg daily. · Degenerative Mitral Valve Disease:   · Continue medication therapy as above. · Continue intravenous hydration and electrolyte replenishment. · We will closely follow up. · Metastatic breast cancer: I think her current problem is secondary to side effect from her chemotherapy.  We will discuss further prior to discharge. Sincerely,  Jonny Hook MD, F.A.C.C. [facility] Cardiology Specialist    [address], [place], [address] [place] [address]  Phone: [phone], Fax: [phone]        I believe that the risk of significant morbidity and mortality related to the patient's current medical conditions are: intermediate-high. The documentation recorded by the scribe, accurately and completely reflects the services I personally performed and the decisions made by me. Jonny Hook MD, F.A.C.C.  July 16, 2019

## 2019-07-16 NOTE — PROGRESS NOTES
Patient able to ambulate to the bathroom. Patient tolerated well. Patient was incontinent. Linen change, and back to chair. No complaints.

## 2019-07-16 NOTE — PROGRESS NOTES
Nutrition Assessment    Type and Reason for Visit: Initial    Nutrition Recommendations:  Strawberry nutrition supplements    Nutrition Assessment: Moderate malnutrition and unintentional weight losses r/t inadequate nutrient intakes and AEB PO < 50% for multiple months, mild muscle and fat losses, and weight loss trends (met breast CA). Agreeable to use Strawberry Ensure. Is SOB upon visit, only acheiving webb ingestion from breakfast thus far. Not appropriate for education regarding carb counting at this point. Hx of dysphagia with learned techniques to aid swallow safety, although admittedly coughs at times. Malnutrition Assessment:  · Malnutrition Status: Meets the criteria for moderate malnutrition  · Context: Acute illness or injury  · Findings of the 6 clinical characteristics of malnutrition (Minimum of 2 out of 6 clinical characteristics is required to make the diagnosis of moderate or severe Protein Calorie Malnutrition based on AND/ASPEN Guidelines):  1. Energy Intake-Less than or equal to 50% of estimated energy requirement, Greater than or equal to 3 months    2. Weight Loss-7.5% loss or greater, in 6 months  3. Fat Loss-Mild subcutaneous fat loss, Fat overlying the ribs  4. Muscle Loss-Mild muscle mass loss, Temples (temporalis muscle)  5. Fluid Accumulation-Mild fluid accumulation, Extremities  6.  Strength-Not measured    Nutrition Risk Level:  Moderate, High    Nutrient Needs:  · Estimated Daily Total Kcal: 9663-8407(82-74)  · Estimated Daily Protein (g): 59-68(1.3-1.5)  · Estimated Daily Total Fluid (ml/day): 1900    Nutrition Diagnosis:   · Problem: Unintended weight loss, Moderate malnutrition  · Etiology: related to Insufficient energy/nutrient consumption     Signs and symptoms:  as evidenced by Weight loss, Intake 0-25%    Objective Information:  · Nutrition-Focused Physical Findings: mild muscle and fat losses  · Wound Type: (scabs on back)  · Current Nutrition

## 2019-07-16 NOTE — PLAN OF CARE
to the disease process, condition or treatment will be avoided or minimized  Description  Complications related to the disease process, condition or treatment will be avoided or minimized  7/16/2019 4231 by Jolyn Alpers, RN  Outcome: Ongoing  Note:   Patient continues with incontinence episodes. Pericare given after each episode. 7/15/2019 2142 by Joslyn Lara RN  Outcome: Ongoing     Problem: Pain:  Description  Pain management should include both nonpharmacologic and pharmacologic interventions. Goal: Pain level will decrease  Description  Pain level will decrease  Outcome: Ongoing  Note:   Patient complained of abdominal pain @ #2 on scal 0-10. Declined need for PRN Tylenol. Will continue to monitor.      Problem: Nutrition  Goal: Optimal nutrition therapy  7/16/2019 0818 by Linda Huerta, RD, LD  Outcome: Ongoing  Note:   Nutrition Problem: Unintended weight loss, Moderate malnutrition  Intervention: Food and/or Nutrient Delivery: Start oral diet  Nutritional Goals: PO >75% meals and supplements  Ensure Enlive (strawberry)     Problem: Urinary Elimination:  Goal: Ability to reestablish a normal urinary elimination pattern will improve - after catheter removal  Description  Ability to reestablish a normal urinary elimination pattern will improve  7/16/2019 0922 by Jolyn Alpers, RN  Outcome: Completed  7/15/2019 2142 by Joslyn Lara RN  Outcome: Ongoing

## 2019-07-16 NOTE — PROGRESS NOTES
Physical Therapy    Facility/Department: [facility] AT THE [facility] MED SURG  Initial Assessment    NAME: Flavia Hull  : 1934  MRN: [id number]    Date of Service: 2019    Discharge Recommendations:  Continue to assess pending progress        Assessment   Body structures, Functions, Activity limitations: Decreased functional mobility ; Decreased high-level IADLs;Decreased ADL status; Decreased endurance;Decreased strength;Decreased balance  Assessment: The patient is an 80 y.o. female who was admitted due to an acute kidney injury. She demonstrates decreased endurance, decreased LE strength, and impaired balance. She would benefit from skilled PT to address her deficits and improve functional mobility. Treatment Diagnosis: General weakness, decreased activity endurance  Prognosis: Fair  Decision Making: Medium Complexity  Patient Education: POC  REQUIRES PT FOLLOW UP: Yes  Activity Tolerance  Activity Tolerance: Patient limited by fatigue;Patient limited by endurance       Patient Diagnosis(es): The primary encounter diagnosis was Acute renal insufficiency. Diagnoses of Fatigue, unspecified type, Hypomagnesemia, Hypokalemia, and Diastolic congestive heart failure, unspecified HF chronicity (HCC) were also pertinent to this visit. has a past medical history of Cancer ([facility] [address] Diabetes mellitus ([address] Duodenal ulcer with hemorrhage, Esophageal hernia, H/O echocardiogram, Hiatal hernia, History of echocardiogram, History of Holter monitoring, Hyperlipidemia, Hypertension, Macular degeneration, PONV (postoperative nausea and vomiting), and Stress fracture of lumbar vertebra. has a past surgical history that includes bladder suspension; Cataract removal; eye surgery;  section; back surgery (2013); Tunneled venous port placement (Left, 2015); Breast lumpectomy (Right, 2015); other surgical history (Right, 10/21/15); Stomach surgery (2013);  Upper gastrointestinal endoscopy (); and Upper Balance  Posture: Fair  Sitting - Static: Good  Sitting - Dynamic: Fair;+  Standing - Static: Fair  Standing - Dynamic: Fair;-        Plan   Plan  Times per week: 7  Times per day: Twice a day  Plan Comment: 1x/day on weekends  Safety Devices  Type of devices: All fall risk precautions in place    Goals  Short term goals  Time Frame for Short term goals: 10 days  Short term goal 1: Pt will amb 22' with FWW, supervision, without LOB  Short term goal 2: Patient will be independent with transfers and bed mobility  Short term goal 3: Pt will tolerate 20-30 min of therex/act to improve endurance for ADLs.   Patient Goals   Patient goals : Return home       Therapy Time   Individual Concurrent Group Co-treatment   Time In 0705         Time Out 0730         Minutes [address] [place] [address], DPT

## 2019-07-16 NOTE — PROGRESS NOTES
07/16/2019    GLUCOSE 171 07/16/2019    PROT 4.8 07/16/2019    LABALBU 2.8 07/16/2019    CALCIUM 7.6 07/16/2019    BILITOT 0.32 07/16/2019    ALKPHOS 63 07/16/2019    AST 16 07/16/2019    ALT 12 07/16/2019     Potassium:    Lab Results   Component Value Date    K 3.5 07/16/2019     Magnesium:    Lab Results   Component Value Date    MG 2.2 07/16/2019           Xr Chest Portable    Result Date: 7/15/2019  EXAMINATION: ONE XRAY VIEW OF THE CHEST 7/15/2019 12:57 pm COMPARISON: 13 July 2019 HISTORY: ORDERING SYSTEM PROVIDED HISTORY: fatigue TECHNOLOGIST PROVIDED HISTORY: fatigue FINDINGS: AP portable view of the chest time stamped at 1232 hours demonstrates overlying cardiac monitoring electrodes. Heart size is normal.  There is redemonstration of a moderate hiatal hernia. Lung volumes are low with bibasilar stranding consistent with atelectasis. No focal consolidation gross effusion or extrapleural air is noted. Surgical clips are present in the right axilla. No change from prior study. Low lung volumes with basilar atelectasis. ASSESSMENT:  DINORA, hypomag, hypokalemia, all improving                                UTI            Hospital Problems:  Principal Problem:    Acute renal failure superimposed on stage 3 chronic kidney disease (HCC)  Active Problems:    DM type 2 (diabetes mellitus, type 2) ([address]    Essential hypertension    Dysphagia    Malignant neoplasm of upper-outer quadrant of female breast ([address]    Vocal cord paralysis, unilateral complete    Atrial fibrillation with RVR ([facility])    Hypomagnesemia    Hypokalemia  Resolved Problems:    * No resolved hospital problems.  *      All Problems:  Patient Active Problem List   Diagnosis    Bilateral atelectasis    Bilateral pleural effusion    Thoracic vertebral fracture (T9 fracture)    DM type 2 (diabetes mellitus, type 2) ([facility])    Essential hypertension    Duodenal ulcer s/p surgery    Fall involving wheelchair causing accidental injury    Abdominal pain, other specified site    Anemia    History of duodenal ulcer    Dysphagia    Malignant neoplasm of upper-outer quadrant of female breast (HCC)    Paroxysmal atrial fibrillation (HCC)    Lung nodule    Duodenal ulcer with hemorrhage    Pure hypercholesterolemia    Nausea    COPD exacerbation (HCC)    Bronchitis    Hypoxia    Vocal cord paralysis, unilateral complete    Acute renal failure superimposed on stage 2 chronic kidney disease (HCC)    Acute cystitis    Acute renal failure superimposed on stage 3 chronic kidney disease (HCC)    Atrial fibrillation with RVR (HCC)    Hypomagnesemia    Hypokalemia       PLAN:  · Continue IV fluids  · IV antibiotic for UTI  · PT/OT    HIGH RISK MEDS: none    Dennis Beckett M.D.

## 2019-07-16 NOTE — PROGRESS NOTES
Daughter expressed some concerns about patient having stomach cramps at this time. Writer spoke with patient and she stated she does not need changed, and she is dry. Will monitor.

## 2019-07-17 PROBLEM — N39.0 UTI (URINARY TRACT INFECTION): Status: ACTIVE | Noted: 2019-01-01

## 2019-07-17 NOTE — DISCHARGE SUMMARY
non-tender, non-distended, normal bowel sounds  EXT:    +1 edema BLE  NEURO: follows commands, DEL RIO, no deficits  SKIN:   no rashes or significant lesions    Significant Diagnostic Studies:   Lab Results   Component Value Date    WBC 4.0 07/17/2019    HGB 8.5 (L) 07/17/2019     07/17/2019       Lab Results   Component Value Date    BUN 30 (H) 07/17/2019    CREATININE 1.25 (H) 07/17/2019     (H) 07/17/2019    K 3.4 (L) 07/17/2019    CALCIUM 7.2 (L) 07/17/2019     (H) 07/17/2019    CO2 19 (L) 07/17/2019    LABGLOM 41 (L) 07/17/2019       Lab Results   Component Value Date    WBCUA 2 TO 5 07/15/2019    RBCUA None 07/15/2019    EPITHUA 0 TO 2 07/15/2019    LEUKOCYTESUR NEGATIVE 07/15/2019    SPECGRAV 1.015 07/15/2019    GLUCOSEU NEGATIVE 07/15/2019    KETUA NEGATIVE 07/15/2019    PROTEINU TRACE (A) 07/15/2019    HGBUR NEGATIVE 07/15/2019    CASTUA NOT REPORTED 07/15/2019    CRYSTUA NOT REPORTED 07/15/2019    BACTERIA 4+ (A) 07/15/2019    YEAST NOT REPORTED 07/15/2019       Xr Chest Portable    Result Date: 7/15/2019  EXAMINATION: ONE XRAY VIEW OF THE CHEST 7/15/2019 12:57 pm COMPARISON: 13 July 2019 HISTORY: ORDERING SYSTEM PROVIDED HISTORY: fatigue TECHNOLOGIST PROVIDED HISTORY: fatigue FINDINGS: AP portable view of the chest time stamped at 1232 hours demonstrates overlying cardiac monitoring electrodes. Heart size is normal.  There is redemonstration of a moderate hiatal hernia. Lung volumes are low with bibasilar stranding consistent with atelectasis. No focal consolidation gross effusion or extrapleural air is noted. Surgical clips are present in the right axilla. No change from prior study. Low lung volumes with basilar atelectasis.        Discharge Diagnoses:    Principal Problem:    Acute renal failure superimposed on stage 3 chronic kidney disease (HCC)  Active Problems:    DM type 2 (diabetes mellitus, type 2) ([address]    Essential hypertension    Dysphagia    Malignant neoplasm of upper-outer quadrant of female breast ([facility] [address]    Vocal cord paralysis, unilateral complete    Atrial fibrillation with RVR (HCC)    Hypomagnesemia    Hypokalemia    Moderate malnutrition (HCC)    UTI (urinary tract infection)  Resolved Problems:    * No resolved hospital problems. *      Active Hospital Problems    Diagnosis Date Noted    UTI (urinary tract infection) [N39.0] 07/17/2019    Moderate malnutrition ([facility] [address] [E44.0] 07/16/2019    Acute renal failure superimposed on stage 3 chronic kidney disease ([facility] [address] [N17.9, N18.3] 07/15/2019    Atrial fibrillation with RVR (HCC) [I48.91] 07/15/2019    Hypomagnesemia [E83.42] 07/15/2019    Hypokalemia [E87.6] 07/15/2019    Vocal cord paralysis, unilateral complete [J38.01] 01/08/2019    Mal[facility] [C50.419] 06/10/2015    Dysphagia [R13.10] 11/26/2013    Essential hypertension [I10] 08/09/2013    DM type 2 (diabetes mellitus, type 2) ([facility] [E11.9] 08/09/2013       Discharge Medications:       Vineet Katharine   Home Medication Instructions OKP:[id number]    Printed on:07/17/19 1220   Medication Information                      acetaminophen (TYLENOL) 500 MG tablet  Take 1,000 mg by mouth every 6 hours as needed. apixaban (ELIQUIS) 2.5 MG TABS tablet  Take 1 tablet by mouth 2 times daily             atorvastatin (LIPITOR) 40 MG tablet  TAKE 1 TABLET BY MOUTH DAILY             Calcium Carbonate-Vitamin D (CALCIUM 600 + D PO)  Take 1 tablet by mouth daily.              capecitabine (XELODA) 500 MG chemo tablet  Take 1500 mg twice daily for 7 days on and then take 7 days off, dispense 4 week supply             ciprofloxacin (CIPRO) 500 MG tablet  Take 1 tablet by mouth 2 times daily for 7 days             diltiazem (CARDIZEM CD) 180 MG extended release capsule  Take 1 capsule by mouth daily             diphenhydrAMINE (BENADRYL) 25 MG tablet    Take 25 mg by mouth nightly as needed for Sleep

## 2019-07-17 NOTE — PROGRESS NOTES
HISTORY: ORDERING SYSTEM PROVIDED HISTORY: fatigue TECHNOLOGIST PROVIDED HISTORY: fatigue FINDINGS: AP portable view of the chest time stamped at 1232 hours demonstrates overlying cardiac monitoring electrodes. Heart size is normal.  There is redemonstration of a moderate hiatal hernia. Lung volumes are low with bibasilar stranding consistent with atelectasis. No focal consolidation gross effusion or extrapleural air is noted. Surgical clips are present in the right axilla. No change from prior study. Low lung volumes with basilar atelectasis. ASSESSMENT:  improving            Hospital Problems:  Principal Problem:    Acute renal failure superimposed on stage 3 chronic kidney disease (HCC)  Active Problems:    DM type 2 (diabetes mellitus, type 2) ([address]    Essential hypertension    Dysphagia    Malignant neoplasm of upper-outer quadrant of female breast ([address]    Vocal cord paralysis, unilateral complete    Atrial fibrillation with RVR (HCC)    Hypomagnesemia    Hypokalemia    Moderate malnutrition (HCC)  Resolved Problems:    * No resolved hospital problems.  *      All Problems:  Patient Active Problem List   Diagnosis    Bilateral atelectasis    Bilateral pleural effusion    Thoracic vertebral fracture (T9 fracture)    DM type 2 (diabetes mellitus, type 2) ([address]    Essential hypertension    Duodenal ulcer s/p surgery    Fall involving wheelchair causing accidental injury    Abdominal pain, other specified site    Anemia    History of duodenal ulcer    Dysphagia    Malignant neoplasm of upper-outer quadrant of female breast ([address]    Paroxysmal atrial fibrillation (HCC)    Lung nodule    Duodenal ulcer with hemorrhage    Pure hypercholesterolemia    Nausea    COPD exacerbation (HCC)    Bronchitis    Hypoxia    Vocal cord paralysis, unilateral complete    Acute renal failure superimposed on stage 2 chronic kidney disease (HCC)    Acute cystitis    Acute renal failure superimposed

## 2019-07-17 NOTE — PROGRESS NOTES
Additional Activities Comment  Additional Activities: Other  Additional Activities: Pt tolerated edu of d/c folder and AE with good understanding. Plan   Plan  Times per week: 7x/wk  Times per day: Daily  Current Treatment Recommendations: Strengthening, Balance Training, Functional Mobility Training, Safety Education & Training, Self-Care / ADL  G-Code     OutComes Score                                                  AM-PAC Score             Goals  Short term goals  Time Frame for Short term goals: 10 days  Short term goal 1: Pt. will tolerate 15 mins of BUE ther ex/act to increase overall strength/activity tolerance for functional tasks. Short term goal 2: Pt. will complete bathing/dressing tasks sinkside with SUP to increase overall self care independence. Short term goal 3: Pt. will demo G safety awareness during functional ADL transfers/mobility with reduced risk for falls.        Therapy Time   Individual Concurrent Group Co-treatment   Time In 1130         Time Out 1154         Minutes 24                 ANA Vazquez

## 2019-07-17 NOTE — PROGRESS NOTES
Discharge instructions given to pt and daughter. All questions addressed. Pt aware to make follow-up appointments as listed on AVS. Pt d/c'd off unit at this time, via wheelchair with daughter to home. Belongings in hand. No issues noted.

## 2019-07-17 NOTE — PLAN OF CARE
GERARDO Blount  Outcome: Ongoing  Note:   No s/s of urinary infection at this time. 7/16/2019 7485 by Mando Loo RN  Outcome: Ongoing  Note:   Patient afebrile. Vitals stable. Labs improving. Continues with malodorous urine. IV antibiotics ordered. Goal: Complications related to the disease process, condition or treatment will be avoided or minimized  Description  Complications related to the disease process, condition or treatment will be avoided or minimized  7/16/2019 2255 by Oz Oakes RN  Outcome: Ongoing  7/16/2019 0922 by Mando Loo RN  Outcome: Ongoing  Note:   Patient continues with incontinence episodes. Pericare given after each episode. Problem: Pain:  Goal: Pain level will decrease  Description  Pain level will decrease  7/16/2019 2255 by Oz Oakes RN  Outcome: Ongoing  Note:   Pain assessed routinely and as needed with a 0-10 scale. PRN pain medication given as appropriate per orders. Pain reassessed within an hour, will continue to monitor    7/16/2019 1636 by Mando Loo RN  Outcome: Ongoing  Note:   Patient complained of abdominal pain @ #2 on scal 0-10. Declined need for PRN Tylenol. Will continue to monitor.   Goal: Control of acute pain  Description  Control of acute pain  Outcome: Ongoing  Goal: Control of chronic pain  Description  Control of chronic pain  Outcome: Ongoing     Problem: Nutrition  Goal: Optimal nutrition therapy  Outcome: Ongoing     Problem: Musculor/Skeletal Functional Status  Goal: Highest potential functional level  Outcome: Ongoing  Goal: Absence of falls  Outcome: Ongoing

## 2019-07-19 PROBLEM — I50.33 ACUTE ON CHRONIC DIASTOLIC (CONGESTIVE) HEART FAILURE (HCC): Status: ACTIVE | Noted: 2019-01-01

## 2019-07-19 PROBLEM — E87.0 HYPERNATREMIA: Status: ACTIVE | Noted: 2019-01-01

## 2019-07-19 PROBLEM — I50.33 ACUTE ON CHRONIC DIASTOLIC CHF (CONGESTIVE HEART FAILURE), NYHA CLASS 3 (HCC): Status: ACTIVE | Noted: 2019-01-01

## 2019-07-19 PROBLEM — E87.70 FLUID OVERLOAD: Status: ACTIVE | Noted: 2019-01-01

## 2019-07-19 PROBLEM — J44.9 COPD (CHRONIC OBSTRUCTIVE PULMONARY DISEASE) (HCC): Status: ACTIVE | Noted: 2019-01-01

## 2019-07-19 NOTE — ED PROVIDER NOTES
components within normal limits   MICROSCOPIC URINALYSIS - Abnormal; Notable for the following components:    Bacteria, UA TRACE (*)     Amorphous, UA TRACE (*)     All other components within normal limits   GLUCOSE, WHOLE BLOOD - Abnormal; Notable for the following components:    POC Glucose 185 (*)     All other components within normal limits   GLUCOSE, WHOLE BLOOD - Abnormal; Notable for the following components:    POC Glucose 113 (*)     All other components within normal limits   CBC WITH AUTO DIFFERENTIAL - Abnormal; Notable for the following components:    RBC 3.04 (*)     Hemoglobin 9.1 (*)     Hematocrit 30.8 (*)     RDW 17.9 (*)     Seg Neutrophils 69 (*)     Lymphocytes 12 (*)     Monocytes 13 (*)     Immature Granulocytes 5 (*)     Absolute Lymph # 0.72 (*)     All other components within normal limits   COMPREHENSIVE METABOLIC PANEL - Abnormal; Notable for the following components:    Glucose 187 (*)     BUN 27 (*)     CREATININE 1.47 (*)     Calcium 8.4 (*)     Chloride 110 (*)     Total Protein 4.6 (*)     Alb 2.9 (*)     GFR Non- 34 (*)     GFR  41 (*)     All other components within normal limits   GLUCOSE, WHOLE BLOOD - Abnormal; Notable for the following components:    POC Glucose 153 (*)     All other components within normal limits   GLUCOSE, WHOLE BLOOD - Abnormal; Notable for the following components:    POC Glucose 226 (*)     All other components within normal limits   CBC WITH AUTO DIFFERENTIAL - Abnormal; Notable for the following components:    RBC 2.94 (*)     Hemoglobin 8.6 (*)     Hematocrit 28.8 (*)     RDW 18.6 (*)     Seg Neutrophils 67 (*)     Lymphocytes 15 (*)     Immature Granulocytes 5 (*)     Absolute Lymph # 0.99 (*)     Absolute Immature Granulocyte 0.33 (*)     All other components within normal limits   COMPREHENSIVE METABOLIC PANEL - Abnormal; Notable for the following components:    Glucose 141 (*)     BUN 30 (*)     CREATININE 1.81 (*) may have been sent were not resulted at the time of this patient visit. EMERGENCY DEPARTMENT COURSE andMedical Decision Making:     MDM/  ED Course as of Jul 23 2055 Fri Jul 19, 2019   1302 Recent culture positive UTI, E. coli sensitive to Cipro. Patient is currently on Cipro    [AB]      ED Course User Index  [AB] Mena Pro, DO     Although not hypoxic, pt has 3-4 word dyspnea along with peripheral edema, all signs of fluid overload which would benefit from IV diuresis. No evidence of PE or ischemia. Pt will be admitted for further eval and care. Dr Lyly Velasquez accepts  ED Medications administered this visit:  Medications - No data to display      Procedures: (None if blank)       CLINICAL       1. Hypervolemia, unspecified hypervolemia type    2. Dyspnea and respiratory abnormalities    3. Debility    4. Other dysphagia    5. Vocal cord paralysis, unilateral complete    6. Anemia in neoplastic disease    7.  Hypokalemia          DISPOSITION/PLAN   DISPOSITION Admitted 07/19/2019 02:31:01 PM      PATIENT REFERRED TO:      DISCHARGE MEDICATIONS:             (Please note that portions of this note were completed with a voice recognition program.  Efforts were made to edit the dictations but occasionallywords are mis-transcribed.)      Odum DO [place],FACEP (electronically signed)  Attending Physician, Emergency [address], DO  07/23/19 2055

## 2019-07-19 NOTE — PROGRESS NOTES
RESPIRATORY ASSESSMENT PROTOCOL                                                                                              Patient Name: Danae Pan Room#: 2988/4598-84 : 1934     Admitting diagnosis: Acute on chronic diastolic (congestive) heart failure (HCC) [I50.33]  Acute on chronic diastolic CHF (congestive heart failure), NYHA class 3 ([facility] [I50.33]       Medical History:   Past Medical History:   Diagnosis Date    Cancer ([facility] 2015    right breast    Diabetes mellitus ([facility]     Duodenal ulcer with hemorrhage     Esophageal hernia     H/O echocardiogram 2016    EF >65%. LV wall thickness is midly increased. Moderate mitral leaflet thickening with moderate posterior mitral valve leaflet prolapse. Mild mitral regurgitation. Moderate mitral valve stenosis mean gradient  of 6.4 mmHg. Moderate (ferade ll) diastolic dysfunction.  Hiatal hernia     History of echocardiogram 2018    EF 70%. LV wall thickness is mildly increased. Moderate mitral valve sclerosis w/ stenosis. Mean gradient 4.4. Mild mitral regurg. Evidence of moderate (grade II) diastolic dysfunction seen.  History of Holter monitoring 2016    Frequent PAC's with multiple short runs of what appears to be atrial fib.  including with RVR at times as fast as 160 bpm,    Hyperlipidemia     Hypertension     Macular degeneration     PONV (postoperative nausea and vomiting)     Stress fracture of lumbar vertebra     from coughing; dx last week       PATIENT ASSESSMENT    LABORATORY DATA  Hematology:   Lab Results   Component Value Date    WBC 4.8 2019    RBC 3.14 2019    HGB 9.3 2019    HCT 29.7 2019     2019     Chemistry:    Lab Results   Component Value Date    PHART 7.387 2019    QZW0RNQ 31.8 2019    PO2ART 82.0 2019    U4MAPHDH 96.1 2019    ECA3JVM 18.7 2019    PBEA NOT REPORTED 2019       Blood Culture:   Sputum Culture: VITALS  Pulse: 104   Resp: 20  BP: (!) 95/56  SpO2: 99 % O2 Device: Nasal cannula  Temp: 98 °F (36.7 °C)  Comment:   SKIN COLOR  [] Normal  [] Pale  [] Dusky  [] Cyanotic      RESPIRATORY PATTERN  [] Normal  [] Dyspnea  [] Cheyne-Menjivar  [] Kussmaul  [] Biots  AMBULATORY  [] Yes  [] No  [] With Assistance    PEAK FLOW  Predicted:     Personal Best:        VITAL CAPACITY  Predicted value:  ml  Actual Value:  ml  30% of Predicted:  ml  Patient Acuity 0 1 2 3 4 Score   Level of Concious (LOC) [x]  Alert & Oriented or Pt normal LOC []  Confused;follows directions []  Confused & uncooper-ative []  Obtunded []  Comatose    Respiratory Rate  (RR) [x]  Reg. rate & pattern. 12 - 20 bpm  []  Increased RR. Greater than 20 bpm   []  SOB w/ exertion or RR greater than 24 bpm []  Access- ory muscle use at rest. Abn.  resp. []  SOB at rest.   0   Bilateral Breath Sounds (BBS) []  Clear []  Diminish-ed bases  [x]  Diminish-ed t/o, or rales   []  Sporadic, scattered wheezes or rhonchi []  Persistentwheezes and, or absent BBS 2   Cough [x]  Strong, effective, & non-prod. []  Effective & prod. Less than 25 ml (2 TBSP) over past 24 hrs []  Ineffective & non-prod to less than 25 ML over past 24 hrs []  Ineffective and, or greater than 25 ml sputum prod. past 24 hrs. []  Nonspon- taneous; Requires suctioning 0   Pulmonary History  (PULM HX) []  No smoking and no chronic pulmonaryhistory []  Former smoker. Quit over 12 mos. ago []  Current smoker or quit w/ in 12 mos [x]  Pulm. History and, or 20 pk/yr smoking hx []  Admitted w/ acute pulm. dx and, or has been admitted w/ pulm. dx 2 or more times over past 12 mos 3   Surgical History this Admit  (SURG HX) [x]  No surgery []  General surgery []  Lower abdominal []  Thoracic or upper abdominal   []  Thoracic w/ pulm. disease 0   Chest X-Ray (CXR)/CT Scan []  Clear or not applicable []  Not available [x]  Atelect- asis or pleural effusions []  Localized infiltrate or pulm.  edema

## 2019-07-19 NOTE — TELEPHONE ENCOUNTER
Dr. Dennie Cote contacted regarding patient high risk for readmission. Patient disposition unresolved at this time.

## 2019-07-19 NOTE — H&P
(diabetes mellitus, type 2) ([facility]    Essential hypertension    Duodenal ulcer s/p surgery    Fall involving wheelchair causing accidental injury    Abdominal pain, other specified site    Anemia    History of duodenal ulcer    Dysphagia    Malignant neoplasm of upper-outer quadrant of female breast ([facility]    Paroxysmal atrial fibrillation (HCC)    Lung nodule    Duodenal ulcer with hemorrhage    Pure hypercholesterolemia    Nausea    COPD exacerbation (HCC)    Bronchitis    Hypoxia    Vocal cord paralysis, unilateral complete    Acute renal failure superimposed on stage 2 chronic kidney disease (HCC)    Acute cystitis    Acute renal failure superimposed on stage 3 chronic kidney disease (HCC)    Atrial fibrillation with RVR (HCC)    Hypomagnesemia    Hypokalemia    Moderate malnutrition (HCC)    UTI (urinary tract infection)    Acute on chronic diastolic CHF (congestive heart failure), NYHA class 3 (HCC)    COPD (chronic obstructive pulmonary disease) (HCC)    Hypernatremia    Fluid overload    Acute on chronic diastolic (congestive) heart failure (HCC)           Plan:       · This patient requires inpatient admission because of acute on chronic diastolic CHF , type 2 DM,   · Factors affecting the medical complexity of this patient include metastatic breast Ca, UTI, COPD,  · Estimated length of stay is 3 days  · IV diuresis  ·   High risk medications: none    Marya Hernadez MD  CORE MEASURES  · DVT prophylaxis: already on chronic anticoagulation  · Decubitus ulcer present on admission: No  · CODE STATUS: DNR-CCA  · Nutrition Status: good   · Physical therapy: No   · Old Charts reviewed: Yes  · EKG Reviewed:  Yes  · Advance Directive Addressed: Yes    Marya Hernadez M.D.

## 2019-07-20 NOTE — FLOWSHEET NOTE
[facility] nurse int to talk with patient and her daughter. Discussed Hospice care. Patient and daughter state they would rather have home leonel nurses and PT/OT at home when discharged. Denies need for Hospice services at present time.

## 2019-07-20 NOTE — PROGRESS NOTES
General  Chart Reviewed: Yes  Patient assessed for rehabilitation services?: Yes  Family / Caregiver Present: No  Referring Practitioner: Noemi Rivero PA-C  Diagnosis: CHF  Subjective  Subjective: Patient reported \"its been just a frustrating morning, nothing is going right. \"  Patient Currently in Pain: Denies  Vital Signs  Patient Currently in Pain: Denies  Social/Functional History  Social/Functional History  Lives With: Alone  Type of Home: Apartment  Home Layout: One level  Home Access: Stairs to enter with rails  Entrance Stairs - Number of Steps: 4  Entrance Stairs - Rails: Both  Bathroom Shower/Tub: Tub/Shower unit  Bathroom Equipment: Grab bars in shower, Shower chair  Home Equipment: Cane  ADL Assistance: Independent  Homemaking Assistance: Needs assistance  Homemaking Responsibilities: No  Ambulation Assistance: Independent  Transfer Assistance: Independent  Active : No  Additional Comments: Daughter assistance with cleaning and getting groceries.  Reports previously being independent with all ADLS at home       Objective   Vision: Impaired  Vision Exceptions: Wears glasses for reading  Hearing: Within functional limits          Balance  Sitting Balance: Stand by assistance  Standing Balance: Contact guard assistance  Functional Mobility  Functional - Mobility Device: Rolling Walker  Activity: Other  Assist Level: Contact guard assistance  ADL  Feeding: Setup  Grooming: Setup  UE Bathing: Stand by assistance  LE Bathing: Minimal assistance  UE Dressing: Stand by assistance  LE Dressing: Minimal assistance  Toileting: Contact guard assistance        Bed mobility  Supine to Sit: Contact guard assistance  Sit to Supine: Contact guard assistance  Transfers  Sit to stand: Contact guard assistance  Stand to sit: Contact guard assistance     Cognition  Overall Cognitive Status: WFL                 LUE AROM (degrees)  LUE AROM : WFL  Left Hand AROM (degrees)  Left Hand AROM: WFL  RUE AROM (degrees)  RUE

## 2019-07-21 NOTE — PROGRESS NOTES
fracture (T9 fracture)    DM type 2 (diabetes mellitus, type 2) ([facility]    Essential hypertension    Duodenal ulcer s/p surgery    Fall involving wheelchair causing accidental injury    Abdominal pain, other specified site    Anemia    History of duodenal ulcer    Dysphagia    Malignant neoplasm of upper-outer quadrant of female breast ([facility]    Paroxysmal atrial fibrillation (HCC)    Lung nodule    Duodenal ulcer with hemorrhage    Pure hypercholesterolemia    Nausea    COPD exacerbation (HCC)    Bronchitis    Hypoxia    Vocal cord paralysis, unilateral complete    Acute renal failure superimposed on stage 2 chronic kidney disease (HCC)    Acute cystitis    Acute renal failure superimposed on stage 3 chronic kidney disease (HCC)    Atrial fibrillation with RVR (HCC)    Hypomagnesemia    Hypokalemia    Moderate malnutrition (HCC)    UTI (urinary tract infection)    Acute on chronic diastolic CHF (congestive heart failure), NYHA class 3 (HCC)    COPD (chronic obstructive pulmonary disease) (HCC)    Hypernatremia    Fluid overload    Acute on chronic diastolic (congestive) heart failure (HCC)       PLAN:  · CHF---DC lasix  · UTI---continue Rocephen  · Weakness, terminal breast Ca---refuses hospice.  Wants PT--will have  discuss in AM    HIGH RISK MEDS: none    Partha Neely M.D.

## 2019-07-21 NOTE — PROGRESS NOTES
Speech Language Pathology  Facility/Department: [facility] MED SURG   CLINICAL BEDSIDE SWALLOW EVALUATION    NAME: Dariana Frederick  : 1934  MRN: [id number]    ADMISSION DATE: 2019  ADMITTING DIAGNOSIS: has Bilateral atelectasis; Bilateral pleural effusion; Thoracic vertebral fracture (T9 fracture); DM type 2 (diabetes mellitus, type 2) ([address] Essential hypertension; Duodenal ulcer s/p surgery; Fall involving wheelchair causing accidental injury; Abdominal pain, other specified site; Anemia; History of duodenal ulcer; Dysphagia; Malignant neoplasm of upper-outer quadrant of female breast ([address] Paroxysmal atrial fibrillation ([address] Lung nodule; Duodenal ulcer with hemorrhage; Pure hypercholesterolemia; Nausea; COPD exacerbation ([address] Bronchitis; Hypoxia; Vocal cord paralysis, unilateral complete; Acute renal failure superimposed on stage 2 chronic kidney disease ([address] Acute cystitis; Acute renal failure superimposed on stage 3 chronic kidney disease ([address] Atrial fibrillation with RVR ([address] Hypomagnesemia; Hypokalemia; Moderate malnutrition ([address] UTI (urinary tract infection); Acute on chronic diastolic CHF (congestive heart failure), NYHA class 3 (HCC); COPD (chronic obstructive pulmonary disease) ([address] Hypernatremia;  Fluid overload; and Acute on chronic diastolic (congestive) heart failure (HCC) on their problem list.    Date of Eval: 2019  Evaluating Therapist: Darian Jha    Current Diet level:  Current Diet : Soft regular  Current Liquid Diet : Nectar(Nectar thick liquids recommended with thin clear water allowed. )    Pain:  Pain Assessment  Pain Assessment: 0-10  Pain Level: 5    Reason for Referral  Dariana Frederick was referred for a bedside swallow evaluation to assess the efficiency of her swallow function, identify signs and symptoms of aspiration and make recommendations regarding safe dietary consistencies, effective compensatory strategies, and safe eating environment. Impression  Dysphagia Diagnosis: Mild to moderate pharyngeal stage dysphagia  Dysphagia Outcome Severity Scale: Level 4: Mild moderate dysphagia- Intermittent supervision/cueing. One - two diet consistencies restricted     Treatment Plan  Requires SLP Intervention: Yes  Duration/Frequency of Treatment: Daily during inpatient stay. D/C Recommendations: To be determined       Recommended Diet and Intervention  Diet Solids Recommendation: Regular  Liquid Consistency Recommendation: Nectar(Pt can have thin clear water utilizing strategies of small sips, slow rate, and chin tuck)        Therapeutic Interventions: Diet tolerance monitoring; Laryngeal exercises; Patient/Family education    Compensatory Swallowing Strategies  Compensatory Swallowing Strategies: Alternate solids and liquids; Chin tuck; Small bites/sips;Eat/Feed slowly; Swallow 2 times per bite/sip;Effortful swallow; No straws;Remain upright for 30-45 minutes after meals;Upright as possible for all oral intake    Treatment/Goals  Short-term Goals  Timeframe for Short-term Goals: 10 days  Goal 1: Pt will completed pharyngeal strengthening exercises x10 with Alayna  Goal 2: Pt will complete trials of thin liquids with no overt s/sx of aspiration   Goal 3: Pt will independently implement use of compesatory strategies to increase safety during swallow  Long-term Goals  Timeframe for Long-term Goals: 10 days  Goal 1: Pt will complete trials of recommended diet texture with no overt s/sx of aspiration   Dysphagia Goals: The patient will tolerate recommended diet without observed clinical signs of aspiration    General  Chart Reviewed: Yes  Behavior/Cognition: Alert; Cooperative  Follows Directions: Complex  Dentition: Some missing teeth  Patient Positioning: Upright in chair  Baseline Vocal Quality: Weak;Hoarse  Volitional Cough: Weak  Prior Dysphagia History: Pt noted to have past history of speech/dysphagia therapy.    Consistencies Administered: Nectar -

## 2019-07-21 NOTE — PROGRESS NOTES
[]  FEV1 or PEFR or VC 51-79%. []  FEV1 or PEFR or VC  30-49%   []  FEV1 or PEFR or VC less than 30%   0   TOTAL ACUITY: 8       CARE PLAN    If Acuity Level is 2, 3, or 4 in any of the following:    [] BILATERAL BREATH SOUNDS (BBS)     [] PULMONARY HISTORY (PULM HX)  [] PULMONARY FUNCTION (PULM FX)    Goal: Improve respiratory functions in patients with airway disease and decrease WOB    [x] AEROSOL PROTOCOL    Total Acuity:   16-32  []  Secondary Assessment in 24 hrs Total Acuity:  9-15  []  Secondary Assessment in 24 hrs Total Acuity:  4-8  [x]  Secondary Assessment in 48 hrs Total Acuity:  0-3  []  Secondary Assessment in 72 hrs   [facility]N AEROSOL THERAPY with  [physician-ordered bronchodilator(s)] q 4 & Albuterol PRN q2 hrs. Breath-Actuated Neb if BBS Acuity = 4, and pt. can use MP. Notify physician if condition deteriorates. [facility]N AEROSOL THERAPY with  [physician-ordered bronchodilator(s)]  QID and Albuterol PRN q4 hrs. Breath-Actuated Neb if BBS Acuity = 4, and pt. can use MP. Notify physician if condition deteriorates. MDI THERAPY with  2 actuations of [physician-ordered bronchodilator(s)] via spacer TID Albuterol and PRNq4 hrs. If unable to utilize MDI: HHN [physician-ordered bronchodilator(s)] TID and Albuterol PRN q4 hrs. Notify physician if condition deteriorates. MDI THERAPY with  [physician-ordered bronchodilator(s)] via spacer TID PRN. If unable to utilize MDI: HHN [physician-ordered bronchodilator(s)] TID PRN. Notify physician if condition deteriorates. If Acuity Level is 2, 3, or 4 in any of the following:    [] COUGH     [] SURGICAL HISTORY (SURG HX)  [] CHEST XRAY (CXR)    Goal: Improvement in sputum mobilization in patients with ineffective airway clearance. Reverse atelectasis.     [] Bronchopulmonary Hygiene Protocol    Total Acuity:   16-32  []  Secondary Assessment in 24 hrs Total Acuity:  9-15  []  Secondary Assessment in 24 hrs Total Acuity:  4-8  []  Secondary Assessment in

## 2019-07-22 NOTE — PLAN OF CARE
Problem: Falls - Risk of:  Goal: Will remain free from falls  Description  Will remain free from falls  Outcome: Ongoing  Note:   Pt A&Ox4. Pt uses call light appropriately and call light and bedside table remain in reach. Rails up x2 bed in lowest position. Bed alarm is on. Pt wears nonskid socks when standing or ambulating. Fall sign in place. Room remains free of clutter. Pt knows when and how to use assistive device when ambulating. Goal: Absence of physical injury  Description  Absence of physical injury  Outcome: Ongoing     Problem: Risk for Impaired Skin Integrity  Goal: Tissue integrity - skin and mucous membranes  Description  Structural intactness and normal physiological function of skin and  mucous membranes. Outcome: Ongoing  Note:   Patient is educated on the importance of turning and repositioning often. Skin remains warm dry and intact with no skin issues noted. Valente scale is assessed each shift. Will continue to monitor. Problem: Musculor/Skeletal Functional Status  Goal: Highest potential functional level  Outcome: Ongoing  Note:   Patient is encouraged to ambulate to and from the chair, bed, and bathroom. Patient will ambulate at least three times on this shift. Patient is encouraged to do all ADLs within the patient's abilities. Pain medications given at leas fifteen minutes before activity when appropriate. Goal: Absence of falls  Outcome: Ongoing     Problem: Nutrition  Goal: Optimal nutrition therapy  Outcome: Ongoing  Note:   Patient is able to choose meals. Patient encouraged to choose favorite foods and will eat in order to maintain a balanced diet. Problem: Pain:  Goal: Pain level will decrease  Description  Pain level will decrease  Outcome: Ongoing  Goal: Control of acute pain  Description  Control of acute pain  Outcome: Ongoing  Note:   Pt's pain is assessed using 0-10 scale. Pt has PRN pain medications available.  Patient has been educated on use and possible

## 2019-07-22 NOTE — CONSULTS
[address] readmitted to hospital 7/19/19 with CHF. Was in the hospital and discharged 7/17 with weakness and worsening generalized fatigue. Met with pt during that admission. At that visit she was still wanting to continue with treatment for her cancer (metastatic breast cancer). Declined to complete advance directives and wished to be a full code. Dash Pulido is resting in bed during my visit. She has a raspy voice and is only able to speak for few words without taking a breath (unchanged). Has had difficulty swallowing. States that she coughed up some clear secretions. Sees speech therapy. States that she is feeling better today. Briefly discuss her needing fluids last week then being overloaded from the fluids. She is not open to discussion and does not make eye contact with me. Noted information from [facility] on her table. She states that she is not going with hospice \"because they won't help me\". Explored further. She states \"Home Health will help me and hospice won't. I'm not ready to give up yet\". States that her daughter will be staying with her now to help at home. Support given. Pt would benefit from outpatient palliative care support. Question if being realistic about her prognosis. Needs ongoing discussion with oncology. Will follow and support. Gill Whaley RN, [facility] [place] and Nicolette Johns Nurse Coordinator  7/22/2019 10:27 AM

## 2019-07-22 NOTE — DISCHARGE SUMMARY
Discharge Summary    Shade Spaulding  :  1934  MRN:  [id number]    Admit date:  2019      Discharge date: 2019     Admitting Physician:  Rica Navarro MD    Consultants:  none    Procedures: none    Complications: none    Discharge Condition: stable    Hospital Course:   Shade Spaulding is a 80 y.o. female admitted with increased SOB and BLE edema. She has metastatic breast CA. She was previously admitted for DINORA, UTI and dehydration. During that admission she was given IVF and abx. She was found to have volume overload, acute on chronic CHF and was admitted. 19 urine culture negative. NO further abx on discharge. No diuretics on discharge. She is down 2# since admission. Patient did NOT want to pursue hospice at this time. She needs to continue SLP and home health services on discharge. She will follow up with PCP, cardiology and oncology. BMP and CBC later this week. Per Dr. Miley ferrara for discharge. Discharge to home with home health.      Exam:  GEN:  alert and oriented to person, place and time, in no acute distress  EYES:  PERRL  NECK: normal, supple, raspy voice  PULM: Diminished bilaterally - raspy voice, NO wheezes, NO rales, NO rhonchi, no distress  COR:   no murmurs and irregularly irregular  ABD:    soft, non-tender, non-distended, normal bowel sounds  EXT:    +1 edema BLE - feet to knees  NEURO: follows commands, DEL RIO, no deficits  SKIN:   Left arm bruising    Significant Diagnostic Studies:   Lab Results   Component Value Date    WBC 6.7 2019    HGB 8.8 (L) 2019     2019       Lab Results   Component Value Date    BUN 33 (H) 2019    CREATININE 1.86 (H) 2019     2019    K 4.1 2019    CALCIUM 8.6 2019     2019    CO2 23 2019    LABGLOM 26 (L) 2019       Lab Results   Component Value Date    WBCUA None 2019    RBCUA None 2019    EPITHUA 0 TO 2 2019    LEUKOCYTESUR NEGATIVE 07/19/2019    SPECGRAV 1.025 (H) 07/19/2019    GLUCOSEU NEGATIVE 07/19/2019    KETUA NEGATIVE 07/19/2019    PROTEINU TRACE (A) 07/19/2019    HGBUR NEGATIVE 07/19/2019    CASTUA 0 TO 2 HYALINE 07/19/2019    CRYSTUA NOT REPORTED 07/19/2019    BACTERIA TRACE (A) 07/19/2019    YEAST NOT REPORTED 07/19/2019       Xr Chest Standard (2 Vw)    Result Date: 7/19/2019  EXAMINATION: TWO XRAY VIEWS OF THE CHEST 7/19/2019 12:55 pm COMPARISON: 07/15/2019 HISTORY: ORDERING SYSTEM PROVIDED HISTORY: cough, shortness of breath TECHNOLOGIST PROVIDED HISTORY: cough, shortness of breath Initial evaluation. FINDINGS: The trachea is midline. The cardiac silhouette is unremarkable. There is vascular congestion and bilateral pleural effusions. Atelectasis or infiltrates cannot be excluded in the lung bases. Findings are slightly worse than the prior study. There are clips along the right chest wall. Slight worsening of bilateral pleural effusions. Mild vascular congestion. Atelectasis or infiltrates in the lung bases. Follow up to resolution is suggested. Discharge Diagnoses:    Principal Problem:    Acute on chronic diastolic CHF (congestive heart failure), NYHA class 3 ([facility])  Active Problems:    Bilateral pleural effusion    DM type 2 (diabetes mellitus, type 2) ([facility])    Dysphagia    Malignant neoplasm of upper-outer quadrant of female breast ([facility])    Paroxysmal atrial fibrillation ([facility])    Hypoxia    Vocal cord paralysis, unilateral complete    Moderate malnutrition ([facility])    UTI (urinary tract infection)    COPD (chronic obstructive pulmonary disease) ([facility])    Hypernatremia    Fluid overload    Acute on chronic diastolic (congestive) heart failure ([facility])  Resolved Problems:    * No resolved hospital problems.  *      Active Hospital Problems    Diagnosis Date Noted    Acute on chronic diastolic CHF (congestive heart failure), NYHA class 3 ([facility]) [I50.33] 07/19/2019    COPD (chronic obstructive pulmonary disease) ([facility]) BY MOUTH TWICE A DAY WITH FOOD             Multiple Vitamins-Minerals (OCUVITE PRESERVISION PO)  Take  by mouth daily. Multiple Vitamins-Minerals (THERAPEUTIC MULTIVITAMIN-MINERALS) tablet  Take 1 tablet by mouth daily              omeprazole (PRILOSEC) 10 MG delayed release capsule  Take 1 capsule by mouth daily             ondansetron (ZOFRAN) 4 MG tablet  Take 1 tablet by mouth every 8 hours as needed for Nausea or Vomiting             polyethylene glycol (GLYCOLAX) packet  Take 17 g by mouth daily as needed for Constipation             potassium bicarb-citric acid (EFFER-K) 20 MEQ TBEF effervescent tablet  Take 1 tablet by mouth daily             prochlorperazine (COMPAZINE) 10 MG tablet  Take 1 tablet by mouth every 6 hours as needed (nausea)                 Patient Instructions:    Activity: activity as tolerated  Diet: per SLP  Wound Care: none needed  Other: BMP/CBC this week    Disposition:   Discharge to Home with home health    Follow up:  Patient will be followed by Gadiel Martell MD in 1 week  Dr. Memo Merida 1 week  Follow up with oncology    CORE MEASURES on Discharge (if applicable)  ACE/ARB in CHF: NA  Statin in MI: NA  ASA in MI: NA  Statin in CVA: NA  Antiplatelet in CVA: NA    Total time spent on discharge services: 35 minutes    Including the following activities:  Evaluation and Management of patient  Discussion with patient and/or surrogate about current care plan  Coordination with Case Management and/or   Coordination of care with Consultants (if applicable)   Coordination of care with Receiving Facility Physician (if applicable)  Completion of DME forms (if applicable)  Preparation of Discharge Summary  Preparation of Medication Reconciliation  Preparation of Discharge Prescriptions    Signed:  Srikanth Davis PA-C  7/22/2019, 10:39 AM

## 2019-07-22 NOTE — PROGRESS NOTES
Discharge instructions reviewed with patient and daughter. Handout on UTI given. No questions at this time. Patient d/c'd off unit via w/c with daughter to home. Belongings in hand. No issues noted.

## 2019-07-22 NOTE — PROGRESS NOTES
Pt awake in bed watching TV. Vital signs and assessment completed and charted in flow sheets. Voice is raspy and hoarse, pt states that this is normal for her. Swelling to bilateral lower legs, pt states that her feet are normally swollen but this is worse than normal. Call light and belongings in reach, bed alarm on, instructed to use call light for any needs.

## 2019-07-23 NOTE — TELEPHONE ENCOUNTER
CLINICAL PHARMACY NOTE  Post-Discharge Transitions of Care ([facility])    Dr. Gabby Luque,    Patient's daughter states that she plans to continue to follow-up with you. She thinks your appointment is the next one patient will be able to attend. Wanted to send a note about patient's metformin. Appears her creatinine has been elevated recently and a drop in eGFR. Metformin should not be used in eGFR <30 mL. Would continue to monitor this as metformin may need discontinued. Please let me know if I can provide any further outreach. Thank you,  Amber Shea, PharmD, [place], [address] [place] [address],[address] [facility] Clinical Pharmacist  C: [phone]  O: [phone]  Department, toll free: [phone], option 7   ==============================================  Non-face-to-face services provided:  Assessment and support for treatment adherence and medication management-Medication reconciliation completed 7/23/19    Subjective/Objective:  Michelle Cisneros is a 80 y.o. female. Patient was discharged from [facility] on 7/22/19 with a diagnosis of acute on chronic diastolic CHF. Patient outreach to review discharge medications and provide medication review and management. Spoke with daughter, Tri Rocha, as okayed by patient. Allergies   Allergen Reactions    Aspartame And Phenylalanine     Influenza Vaccines      Makes her very sick    Other      Pt states allergic to flu shots makes her sick   Patient states is allergic to Nutrasweet       Discharge Medications (as per discharging medication list found AVS): There are NEW medications for you.  START taking them after you leave the hospital  Medication Sig Notes    potassium bicarb-citric acid (EFFER-K) 20 MEQ TBEF effervescent tablet Take 1 tablet by mouth daily Has and is taking     polyethylene glycol (GLYCOLAX) packet Take 17 g by mouth daily as needed for Constipation      You told us you were taking these medications at home, but the amount or how often you take this medication has CHANGED  fluticasone (FLONASE) 50 MCG/ACT nasal spray 1 spray by Nasal route daily (Patient taking differently: 1 spray by Nasal route daily as needed ) Prn allergies     These are medications you told us you were taking at home, CONTINUE taking them after you leave the hospital   Medication Sig Notes    loperamide (IMODIUM) 2 MG capsule Take 1 capsule by mouth 4 times daily as needed for Diarrhea     HYDROcodone-acetaminophen (NORCO) 5-325 MG per tablet Take 1 tablet by mouth every 6 hours as needed for Pain for up to 30 days.  ondansetron (ZOFRAN) 4 MG tablet Take 1 tablet by mouth every 8 hours as needed for Nausea or Vomiting States has not needed since stopped chemo tablet    losartan (COZAAR) 100 MG tablet TAKE 1 TABLET BY MOUTH EVERY DAY     atorvastatin (LIPITOR) 40 MG tablet TAKE 1 TABLET BY MOUTH DAILY     metFORMIN (GLUCOPHAGE) 500 MG tablet TAKE 1 TABLET BY MOUTH TWICE A DAY WITH FOOD     diltiazem (CARDIZEM CD) 180 MG extended release capsule Take 1 capsule by mouth daily     omeprazole (PRILOSEC) 10 MG delayed release capsule Take 1 capsule by mouth daily     apixaban (ELIQUIS) 2.5 MG TABS tablet Take 1 tablet by mouth 2 times daily     diphenhydrAMINE (BENADRYL) 25 MG tablet   Take 25 mg by mouth nightly as needed for Sleep      Multiple Vitamins-Minerals (THERAPEUTIC MULTIVITAMIN-MINERALS) tablet Take 1 tablet by mouth daily      acetaminophen (TYLENOL) 500 MG tablet Take 1,000 mg by mouth every 6 hours as needed for Pain      Calcium Carbonate-Vitamin D (CALCIUM 600 + D PO) Take 1 tablet by mouth daily.      Multiple Vitamins-Minerals (OCUVITE PRESERVISION PO) Take 1 capsule by mouth daily      polyethylene glycol (GLYCOLAX) packet Take 17 g by mouth daily as needed for Constipation     prochlorperazine (COMPAZINE) 10 MG tablet Take 1 tablet by mouth every 6 hours as needed (nausea) States has not needed since stopped chemo tablet   States was told by physician can

## 2019-07-23 NOTE — CONSULTS
gain.  She was gasping for air this morning, O2 sat was in the 80s. Started on BiPAP and currently feeling a little better. Her blood pressure is running low, started on intravenous fluid hydration and levophed. Current systolic blood pressure is 105 mmHg. Initial lactate level is elevated. She has acute on chronic kidney injury with creatinine of 2.1. BNP is elevated. She has leukocytosis of 23,000. Possible infiltrate on chest x-ray. Past Medical History:    Past Medical History:   Diagnosis Date    Cancer ([facility] [address] 05/2015    right breast    Diabetes mellitus ([facility] [address] [facility]     Duodenal ulcer with hemorrhage     Esophageal hernia     H/O echocardiogram 06/28/2016    EF >65%. LV wall thickness is midly increased. Moderate mitral leaflet thickening with moderate posterior mitral valve leaflet prolapse. Mild mitral regurgitation. Moderate mitral valve stenosis mean gradient  of 6.4 mmHg. Moderate (ferade ll) diastolic dysfunction.  Hiatal hernia     History of echocardiogram 09/24/2018    EF 70%. LV wall thickness is mildly increased. Moderate mitral valve sclerosis w/ stenosis. Mean gradient 4.4. Mild mitral regurg. Evidence of moderate (grade II) diastolic dysfunction seen.  History of Holter monitoring 06/30/2016    Frequent PAC's with multiple short runs of what appears to be atrial fib.  including with RVR at times as fast as 160 bpm,    Hyperlipidemia     Hypertension     Macular degeneration     PONV (postoperative nausea and vomiting)     Stress fracture of lumbar vertebra     from coughing; dx last week     Past Surgical History:  Past Surgical History:   Procedure Laterality Date    BACK SURGERY  09/2013    cement fusion    BLADDER SUSPENSION      BREAST LUMPECTOMY Right 09/16/2015    Dr Yennifer Carey, Lumpectomy   [address]      x1    EYE SURGERY      OTHER SURGICAL HISTORY Right 10/21/15    I&D Rt. breast abcess    STOMACH SURGERY  07/2013    In [address] gastric ulcer-perforation-repair    TUNNELED VENOUS PORT PLACEMENT Left 2015    UPPER GASTROINTESTINAL ENDOSCOPY      UPPER GASTROINTESTINAL ENDOSCOPY N/A 2018    -(normal)antral/duodenal deformity,hiatal hernia     Social History:    Social History     Tobacco Use   Smoking Status Former Smoker    Packs/day: 0.25    Years: 4.00    Pack years: 1.00    Last attempt to quit: 1958    Years since quittin.5   Smokeless Tobacco Never Used   Tobacco Comment    quit 60 years ago     Current Medications:  Outpatient Medications Marked as Taking for the 19 encounter [facility] Encounter)   Medication Sig Dispense Refill    potassium bicarb-citric acid (EFFER-K) 20 MEQ TBEF effervescent tablet Take 1 tablet by mouth daily 30 tablet 0    loperamide (IMODIUM) 2 MG capsule Take 1 capsule by mouth 4 times daily as needed for Diarrhea      HYDROcodone-acetaminophen (NORCO) 5-325 MG per tablet Take 1 tablet by mouth every 6 hours as needed for Pain for up to 30 days.  60 tablet 0    ondansetron (ZOFRAN) 4 MG tablet Take 1 tablet by mouth every 8 hours as needed for Nausea or Vomiting 60 tablet 2    prochlorperazine (COMPAZINE) 10 MG tablet Take 1 tablet by mouth every 6 hours as needed (nausea) 30 tablet 3    losartan (COZAAR) 100 MG tablet TAKE 1 TABLET BY MOUTH EVERY DAY 90 tablet 1    atorvastatin (LIPITOR) 40 MG tablet TAKE 1 TABLET BY MOUTH DAILY 90 tablet 1    metFORMIN (GLUCOPHAGE) 500 MG tablet TAKE 1 TABLET BY MOUTH TWICE A DAY WITH FOOD 90 tablet 1    diltiazem (CARDIZEM CD) 180 MG extended release capsule Take 1 capsule by mouth daily 90 capsule 1    omeprazole (PRILOSEC) 10 MG delayed release capsule Take 1 capsule by mouth daily 90 capsule 1    apixaban (ELIQUIS) 2.5 MG TABS tablet Take 1 tablet by mouth 2 times daily 180 tablet 3    fluticasone (FLONASE) 50 MCG/ACT nasal spray 1 spray by Nasal route daily (Patient taking differently: 1 spray by Nasal route daily as needed ) 1 Bottle 3    diphenhydrAMINE (BENADRYL) 25 MG tablet   Take 25 mg by mouth nightly as needed for Sleep       Multiple Vitamins-Minerals (THERAPEUTIC MULTIVITAMIN-MINERALS) tablet Take 1 tablet by mouth daily       Calcium Carbonate-Vitamin D (CALCIUM 600 + D PO) Take 1 tablet by mouth daily.  Multiple Vitamins-Minerals (OCUVITE PRESERVISION PO) Take 1 capsule by mouth daily         vancomycin  750 mg Intravenous Once       REVIEW OF SYSTEMS:    CONSTITUTIONAL:+ weight loss, no fatigue, weakness, night sweats or fever. HEENT: No new vision difficulties or ringing in the ears. RESPIRATORY: See HPI  CARDIOVASCULAR: See HPI  GI: No nausea, vomiting, diarrhea, constipation, abdominal pain or changes in bowel habits. : No urinary frequency, urgency, incontinence hematuria or dysuria. SKIN: No cyanosis or skin lesions. MUSCULOSKELETAL: No new muscle or joint pain. NEUROLOGICAL: No syncope or TIA-like symptoms. PSYCHIATRIC: No anxiety, pain, insomnia or depression    Objective:     PHYSICAL EXAM:      VITALS:    BP (!) 108/53   Pulse 102   Temp 98.8 °F (37.1 °C) (Tympanic)   Resp 22   Ht 5' (1.524 m)   Wt 150 lb (68 kg)   SpO2 93%   Breastfeeding? No   BMI 29.29 kg/m²   CONSTITUTIONAL: Cooperative, no apparent distress, and appears well nourished / developed. NEUROLOGIC:  Awake and orientated to person, place and time. HEENT: Sclera non-icteric, normocephalic, neck supple, no elevation of JVP, normal carotid pulses with no bruits and thyroid normal size. LUNGS:  Jefm Jens air entry bilaterally. No significant wheezes. Scattered crackles heard. CARDIOVASCULAR:  Regular rate and rhythm with no gallops, rubs, or abnormal heart sounds. The apical impulses not displaced. 2/6 ejection systolic murmur at the left upper sternal border without radiation. The carotid upstroke is normal in amplitude and contour without delay or bruit. JVP is not elevated.   ABDOMEN:  Normal bowel Levophed. · I had a long discussion with the patient and her daughter, she will change her CODE STATUS to DNR CCA but she wants to be treated as much as we can. · I told her the best option we have is to transfer her to a tertiary care center. She need an ICU since we will continue to treat. She did a central line and arterial line. Antibiotic therapy and pressor support. · They know that her prognosis is poor giving her metastatic cancer, renal failure, congestive heart failure and current sepsis. Sincerely,  Amparo Leonard MD, F.A.C.C. [facility] Cardiology Specialist     [facility], [address] [place] [address]  Phone: [phone], Fax: [phone]        I believe that the risk of significant morbidity and mortality related to the patient's current medical conditions are: high. The documentation recorded by the scribe, accurately and completely reflects the services I personally performed and the decisions made by me. Amparo Leonard MD, F.A.C.C.  July 23, 2019

## 2019-07-23 NOTE — ED PROVIDER NOTES
Negative for decreased urine volume, difficulty urinating, dysuria, frequency and hematuria. Musculoskeletal: Negative for arthralgias, back pain and myalgias. Skin: Negative for pallor and rash. Allergic/Immunologic: Negative for food allergies and immunocompromised state. Neurological: Negative for dizziness, syncope, weakness and light-headedness. Hematological: Negative for adenopathy. Does not bruise/bleed easily. Psychiatric/Behavioral: Negative for behavioral problems and suicidal ideas. The patient is not nervous/anxious. Except as noted above the remainder of the review of systems was reviewed and negative. PAST MEDICAL HISTORY     Past Medical History:   Diagnosis Date    Cancer [facility]) 05/2015    right breast    Diabetes mellitus ([facility] [address]     Duodenal ulcer with hemorrhage     Esophageal hernia     H/O echocardiogram 06/28/2016    EF >65%. LV wall thickness is midly increased. Moderate mitral leaflet thickening with moderate posterior mitral valve leaflet prolapse. Mild mitral regurgitation. Moderate mitral valve stenosis mean gradient  of 6.4 mmHg. Moderate (ferade ll) diastolic dysfunction.  Hiatal hernia     History of echocardiogram 09/24/2018    EF 70%. LV wall thickness is mildly increased. Moderate mitral valve sclerosis w/ stenosis. Mean gradient 4.4. Mild mitral regurg. Evidence of moderate (grade II) diastolic dysfunction seen.  History of Holter monitoring 06/30/2016    Frequent PAC's with multiple short runs of what appears to be atrial fib.  including with RVR at times as fast as 160 bpm,    Hyperlipidemia     Hypertension     Macular degeneration     PONV (postoperative nausea and vomiting)     Stress fracture of lumbar vertebra     from coughing; dx last week         SURGICALHISTORY       Past Surgical History:   Procedure Laterality Date    BACK SURGERY  09/2013    cement fusion    BLADDER SUSPENSION      BREAST LUMPECTOMY Right 09/16/2015     Beth, Lumpectomy    CATARACT REMOVAL       SECTION      x1    EYE SURGERY      OTHER SURGICAL HISTORY Right 10/21/15    I&D Rt. breast abcess    STOMACH SURGERY  2013    In [facility] gastric ulcer-perforation-repair    TUNNELED VENOUS PORT PLACEMENT Left 2015    UPPER GASTROINTESTINAL ENDOSCOPY      UPPER GASTROINTESTINAL ENDOSCOPY N/A 2018    -bx(normal)antral/duodenal deformity,hiatal hernia         CURRENT MEDICATIONS       Previous Medications    ACETAMINOPHEN (TYLENOL) 500 MG TABLET    Take 1,000 mg by mouth every 6 hours as needed for Pain     APIXABAN (ELIQUIS) 2.5 MG TABS TABLET    Take 1 tablet by mouth 2 times daily    ATORVASTATIN (LIPITOR) 40 MG TABLET    TAKE 1 TABLET BY MOUTH DAILY    CALCIUM CARBONATE-VITAMIN D (CALCIUM 600 + D PO)    Take 1 tablet by mouth daily. DILTIAZEM (CARDIZEM CD) 180 MG EXTENDED RELEASE CAPSULE    Take 1 capsule by mouth daily    DIPHENHYDRAMINE (BENADRYL) 25 MG TABLET      Take 25 mg by mouth nightly as needed for Sleep     FLUTICASONE (FLONASE) 50 MCG/ACT NASAL SPRAY    1 spray by Nasal route daily    HYDROCODONE-ACETAMINOPHEN (NORCO) 5-325 MG PER TABLET    Take 1 tablet by mouth every 6 hours as needed for Pain for up to 30 days.     LOPERAMIDE (IMODIUM) 2 MG CAPSULE    Take 1 capsule by mouth 4 times daily as needed for Diarrhea    LOSARTAN (COZAAR) 100 MG TABLET    TAKE 1 TABLET BY MOUTH EVERY DAY    METFORMIN (GLUCOPHAGE) 500 MG TABLET    TAKE 1 TABLET BY MOUTH TWICE A DAY WITH FOOD    MULTIPLE VITAMINS-MINERALS (OCUVITE PRESERVISION PO)    Take 1 capsule by mouth daily     MULTIPLE VITAMINS-MINERALS (THERAPEUTIC MULTIVITAMIN-MINERALS) TABLET    Take 1 tablet by mouth daily     OMEPRAZOLE (PRILOSEC) 10 MG DELAYED RELEASE CAPSULE    Take 1 capsule by mouth daily    ONDANSETRON (ZOFRAN) 4 MG TABLET    Take 1 tablet by mouth every 8 hours as needed for Nausea or Vomiting    POLYETHYLENE GLYCOL (GLYCOLAX) PACKET    Take 17 g by mouth daily as needed for Constipation    POTASSIUM BICARB-CITRIC ACID (EFFER-K) 20 MEQ TBEF EFFERVESCENT TABLET    Take 1 tablet by mouth daily    PROCHLORPERAZINE (COMPAZINE) 10 MG TABLET    Take 1 tablet by mouth every 6 hours as needed (nausea)       ALLERGIES     Aspartame and phenylalanine; Influenza vaccines; and Other    FAMILY HISTORY       Family History   Problem Relation Age of Onset    Osteoporosis Mother     Heart Disease Mother     Macular Degen Father     Heart Disease Father           SOCIAL HISTORY       Social History     Socioeconomic History    Marital status:       Spouse name: None    Number of children: None    Years of education: None    Highest education level: None   Occupational History    None   Social Needs    Financial resource strain: None    Food insecurity:     Worry: None     Inability: None    Transportation needs:     Medical: None     Non-medical: None   Tobacco Use    Smoking status: Former Smoker     Packs/day: 0.25     Years: 4.00     Pack years: 1.00     Last attempt to quit: 1958     Years since quittin.5    Smokeless tobacco: Never Used    Tobacco comment: quit 60 years ago   Substance and Sexual Activity    Alcohol use: No    Drug use: No    Sexual activity: None   Lifestyle    Physical activity:     Days per week: None     Minutes per session: None    Stress: None   Relationships    Social connections:     Talks on phone: None     Gets together: None     Attends [employer] service: None     Active member of club or organization: None     Attends meetings of clubs or organizations: None     Relationship status: None    Intimate partner violence:     Fear of current or ex partner: None     Emotionally abused: None     Physically abused: None     Forced sexual activity: None   Other Topics Concern    None   Social History Narrative    None       SCREENINGS      @FLOW([id number])@      PHYSICAL EXAM    (up to 7 for level 4, 8 or more for level 5)     ED Triage Vitals [07/23/19 1152]   BP Temp Temp Source Pulse Resp SpO2 Height Weight   (!) 103/97 98.8 °F (37.1 °C) Tympanic 125 (!) 32 96 % 5' (1.524 m) 150 lb (68 kg)       Physical Exam   Constitutional: She is oriented to person, place, and time. She appears well-developed and well-nourished. Non-toxic appearance. She does not have a sickly appearance. She does not appear ill. She appears distressed. HENT:   Head: Normocephalic and atraumatic. Right Ear: External ear normal.   Left Ear: External ear normal.   Mouth/Throat: Oropharynx is clear and moist. No oropharyngeal exudate. Eyes: Pupils are equal, round, and reactive to light. Conjunctivae and EOM are normal. Right eye exhibits no discharge. Left eye exhibits no discharge. No scleral icterus. Neck: Normal range of motion. Neck supple. No tracheal deviation present. Cardiovascular: Normal rate, regular rhythm and intact distal pulses. Exam reveals no gallop and no friction rub. No murmur heard. Pulmonary/Chest: No stridor. Tachypnea noted. She is in respiratory distress. She has decreased breath sounds. She has wheezes. She has rhonchi. Abdominal: Soft. Bowel sounds are normal. She exhibits no distension and no mass. There is no tenderness. There is no rebound and no guarding. Musculoskeletal: Normal range of motion. She exhibits edema. She exhibits no tenderness or deformity. Neurological: She is alert and oriented to person, place, and time. She has normal reflexes. She displays normal reflexes. No cranial nerve deficit. She exhibits normal muscle tone. Skin: Skin is warm and dry. No rash noted. She is not diaphoretic. No erythema. Psychiatric: She has a normal mood and affect. Her behavior is normal.   Nursing note and vitals reviewed.       DIAGNOSTIC RESULTS     EKG: All EKG's are interpreted by the Emergency Department Physician who either signs orCo-signs this chart in the absence of a cardiologist.      RADIOLOGY: an hour. They wanted the cardiologist back and I went in and discussed with patient again that this is very time sensitive. Her breathing has improved and her pressure is improving however we did have to place her on Levophed. At this point patient wants to remain a full code. Patient is requesting to go to [facility] I called and spoke with her hospitalist who can accept the patient is a transfer. CRITICAL CARE: There was a high probability of clinically significant/life threatening deterioration in this patient's condition which required my urgent intervention. Total critical care time was 48 minutes. This excludes any time for separately reportable procedures. Procedures    FINAL IMPRESSION      1. Healthcare-associated pneumonia    2. Respiratory distress        DISPOSITION/PLAN   DISPOSITION Decision To Transfer 07/23/2019 05:16:50 PM      PATIENT REFERRED TO:  No follow-up provider specified. DISCHARGE MEDICATIONS:  New Prescriptions    No medications on file              Summation      Patient Course:      ED Medications administered this visit:    Medications   0.9 % sodium chloride infusion ( Intravenous Rate/Dose Change 7/23/19 1409)   vancomycin (VANCOCIN) 750 mg in dextrose 5 % 250 mL IVPB (0 mg Intravenous Stop Time 7/23/19 1613)   norepinephrine (LEVOPHED) 16 mg in dextrose 5 % 250 mL infusion (2 mcg/min Intravenous New Bag 7/23/19 1532)   ipratropium-albuterol (DUONEB) nebulizer solution 1 ampule (1 ampule Inhalation Given 7/23/19 1229)   piperacillin-tazobactam (ZOSYN) 3.375 g in dextrose 5 % 50 mL IVPB (mini-bag) (0 g Intravenous Stopped 7/23/19 1506)       New Prescriptions from this visit:    New Prescriptions    No medications on file       Follow-up:  No follow-up provider specified. Final Impression:   1. Healthcare-associated pneumonia    2.  Respiratory distress               (Please note that portions of this note were completed with a voice recognition program.  Efforts were made to edit the dictations but occasionally words are mis-transcribed.)           Jayden Flannery PA-C  07/23/19 5548

## 2019-07-23 NOTE — CARE COORDINATION
Patient was readmit to [facility], discharged on 7/22/19. Pt's daughter Gary Martinez received call from pharmacist today and reviewed medications. CTN will defer transitions call to later date.     Topher Carmichael RN BSN   Care Transitions Nurse  [phone]

## 2019-07-23 NOTE — ED PROVIDER NOTES
[facility]     Emergency Department     Faculty Attestation    I performed a history and physical examination of the patient and discussed management with the resident. I have reviewed and agree with the residents findings including all diagnostic interpretations, and treatment plans as written. Any areas of disagreement are noted on the chart. I was personally present for the key portions of any procedures. I have documented in the chart those procedures where I was not present during the key portions. I have reviewed the emergency nurses triage note. I agree with the chief complaint, past medical history, past surgical history, allergies, medications, social and family history as documented unless otherwise noted below. Documentation of the HPI, Physical Exam and Medical Decision Making performed by gilberto is based on my personal performance of the HPI, PE and MDM. For Physician Assistant/ Nurse Practitioner cases/documentation I have personally evaluated this patient and have completed at least one if not all key elements of the E/M (history, physical exam, and MDM). Additional findings are as noted. Central Line Placement Procedure Note    Indication: central venous monitoring    Consent: The patient provided verbal consent for this procedure. Procedure: The patient was positioned appropriately and the skin over the right femoral vein was prepped with betadine and draped in a sterile fashion. Local anesthesia was obtained by infiltration using 1% Lidocaine without epinephrine. A large bore needle was used to identify the vein. A guide wire was then inserted into the vein through the needle. A triple lumen catheter was then inserted into the vessel over the guide wire using the Seldinger technique. All ports showed good, free flowing blood return and were flushed with saline solution.   The catheter was then securely fastened to the skin with sutures

## 2025-02-01 NOTE — PROGRESS NOTES
Ortega Oliveros    Current Diet level:  Current Diet : Regular  Current Liquid Diet : Thin      Primary Complaint  Patient Complaint: Pt has complaints of having difficutlies with thin liquids but states she does not want thickened liquids. Pt implements use of compensatory strategies to help with drinking (turning head to left with small sips and slow rate)    Pain:  Pain Assessment  Pain Assessment: 0-10  Pain Level: 0    Reason for Referral  Mason Prajapati was referred for a bedside swallow evaluation to assess the efficiency of her swallow function, identify signs and symptoms of aspiration and make recommendations regarding safe dietary consistencies, effective compensatory strategies, and safe eating environment. Impression  Dysphagia Diagnosis: Mild to moderate pharyngeal stage dysphagia  Dysphagia Outcome Severity Scale: Level 4: Mild moderate dysphagia- Intermittent supervision/cueing. One - two diet consistencies restricted     Treatment Plan  Requires SLP Intervention: Yes  Duration/Frequency of Treatment: daily during in pt stay  D/C Recommendations: To be determined       Recommended Diet and Intervention  Diet Solids Recommendation: Regular  Liquid Consistency Recommendation: Thin(Thin Pepsi and water. Nectar-thickened juices and other liquids.)        Therapeutic Interventions: Diet tolerance monitoring; Laryngeal exercises; Patient/Family education    Compensatory Swallowing Strategies  Compensatory Swallowing Strategies: Alternate solids and liquids;Small bites/sips;Eat/Feed slowly; Swallow 2 times per bite/sip;Effortful swallow;Remain upright for 30-45 minutes after meals;Upright as possible for all oral intake; Chin tuck    Treatment/Goals  Short-term Goals  Timeframe for Short-term Goals: dauily during in pt therapy  Goal 1: Pt will demonstrate appropriate use of compensatory strategies given no s/sx of aspiration  Goal 2: tolerate trials of nectar liquids x10  Goal 3: tolerate thin liquids trials

## (undated) DEVICE — CANNULA ORAL NSL AD CO2 N INTUB O2 DEL DISP TRU LNK

## (undated) DEVICE — MEDI-VAC NON-CONDUCTIVE TUBING7MM X 30.5 (100FT): Brand: CARDINAL HEALTH

## (undated) DEVICE — SOLUTION IV IRRIG POUR BRL 0.9% SODIUM CHL 2F7124

## (undated) DEVICE — FORCEPS BX L240CM JAW DIA2.4MM ORNG L CAP W/ NDL DISP RAD